# Patient Record
Sex: FEMALE
[De-identification: names, ages, dates, MRNs, and addresses within clinical notes are randomized per-mention and may not be internally consistent; named-entity substitution may affect disease eponyms.]

---

## 2017-01-22 ENCOUNTER — CHARTING TRANS (OUTPATIENT)
Dept: OTHER | Age: 42
End: 2017-01-22

## 2017-01-22 ASSESSMENT — PAIN SCALES - GENERAL: PAINLEVEL_OUTOF10: 2

## 2017-04-19 ENCOUNTER — OFFICE VISIT (OUTPATIENT)
Dept: OBGYN CLINIC | Facility: CLINIC | Age: 42
End: 2017-04-19

## 2017-04-19 VITALS
DIASTOLIC BLOOD PRESSURE: 68 MMHG | HEART RATE: 71 BPM | WEIGHT: 192.19 LBS | SYSTOLIC BLOOD PRESSURE: 120 MMHG | HEIGHT: 63 IN | BODY MASS INDEX: 34.05 KG/M2

## 2017-04-19 DIAGNOSIS — Z01.419 WELL WOMAN EXAM WITH ROUTINE GYNECOLOGICAL EXAM: Primary | ICD-10-CM

## 2017-04-19 DIAGNOSIS — N76.3 CHRONIC VULVITIS: ICD-10-CM

## 2017-04-19 DIAGNOSIS — Z12.4 ROUTINE PAPANICOLAOU SMEAR: ICD-10-CM

## 2017-04-19 DIAGNOSIS — Z12.31 SCREENING MAMMOGRAM, ENCOUNTER FOR: ICD-10-CM

## 2017-04-19 PROCEDURE — 87624 HPV HI-RISK TYP POOLED RSLT: CPT | Performed by: OBSTETRICS & GYNECOLOGY

## 2017-04-19 PROCEDURE — 88175 CYTOPATH C/V AUTO FLUID REDO: CPT | Performed by: OBSTETRICS & GYNECOLOGY

## 2017-04-19 PROCEDURE — 99396 PREV VISIT EST AGE 40-64: CPT | Performed by: OBSTETRICS & GYNECOLOGY

## 2017-04-19 PROCEDURE — 87210 SMEAR WET MOUNT SALINE/INK: CPT | Performed by: OBSTETRICS & GYNECOLOGY

## 2017-04-19 RX ORDER — CLOTRIMAZOLE AND BETAMETHASONE DIPROPIONATE 10; .64 MG/G; MG/G
CREAM TOPICAL
Qty: 45 G | Refills: 0 | Status: SHIPPED | OUTPATIENT
Start: 2017-04-19 | End: 2017-11-29

## 2017-04-19 RX ORDER — LEVOTHYROXINE SODIUM 88 UG/1
88 TABLET ORAL
COMMUNITY

## 2017-04-19 NOTE — PROGRESS NOTES
Patient ID:   Diana Aguilar  is a 39year old female [id number]        HPI:     Here for general gyn exam. Last seen October 2014. New medical/surgical hx:  None. Patient's last menstrual period was 04/17/2017 (exact date). Menses:   Hx Prior Abnorm technique. under mac        Current Outpatient Prescriptions on File Prior to Visit:  ARMOUR THYROID 90 MG Oral Tab  Disp:  Rfl:    alprazolam 0.25 MG Oral Tab Take 1 tablet (0.25 mg total) by mouth 3 (three) times daily as needed.  Disp: 30 tablet Rfl: 2

## 2017-11-29 RX ORDER — CLOTRIMAZOLE AND BETAMETHASONE DIPROPIONATE 10; .64 MG/G; MG/G
CREAM TOPICAL
Qty: 45 G | Refills: 0 | Status: SHIPPED | OUTPATIENT
Start: 2017-11-29 | End: 2018-08-15 | Stop reason: ALTCHOICE

## 2018-04-22 ENCOUNTER — HOSPITAL ENCOUNTER (OUTPATIENT)
Dept: ULTRASOUND IMAGING | Age: 43
Discharge: HOME | End: 2018-04-22
Attending: FAMILY MEDICINE
Payer: COMMERCIAL

## 2018-04-22 DIAGNOSIS — E03.9 HYPOTHYROIDISM, UNSPECIFIED TYPE: ICD-10-CM

## 2018-04-22 PROCEDURE — 76536 US EXAM OF HEAD AND NECK: CPT | Performed by: FAMILY MEDICINE

## 2018-08-15 ENCOUNTER — OFFICE VISIT (OUTPATIENT)
Dept: FAMILY MEDICINE CLINIC | Facility: CLINIC | Age: 43
End: 2018-08-15
Payer: COMMERCIAL

## 2018-08-15 VITALS
OXYGEN SATURATION: 99 % | BODY MASS INDEX: 36.86 KG/M2 | DIASTOLIC BLOOD PRESSURE: 78 MMHG | HEIGHT: 63 IN | RESPIRATION RATE: 20 BRPM | WEIGHT: 208 LBS | HEART RATE: 76 BPM | TEMPERATURE: 99 F | SYSTOLIC BLOOD PRESSURE: 120 MMHG

## 2018-08-15 DIAGNOSIS — Z13.0 SCREENING FOR ENDOCRINE, NUTRITIONAL, METABOLIC AND IMMUNITY DISORDER: ICD-10-CM

## 2018-08-15 DIAGNOSIS — Z12.31 VISIT FOR SCREENING MAMMOGRAM: ICD-10-CM

## 2018-08-15 DIAGNOSIS — Z13.29 SCREENING FOR ENDOCRINE, NUTRITIONAL, METABOLIC AND IMMUNITY DISORDER: ICD-10-CM

## 2018-08-15 DIAGNOSIS — R53.83 FATIGUE, UNSPECIFIED TYPE: ICD-10-CM

## 2018-08-15 DIAGNOSIS — Z00.00 ROUTINE GENERAL MEDICAL EXAMINATION AT A HEALTH CARE FACILITY: Primary | ICD-10-CM

## 2018-08-15 DIAGNOSIS — E66.01 SEVERE OBESITY (HCC): ICD-10-CM

## 2018-08-15 DIAGNOSIS — Z13.228 SCREENING FOR ENDOCRINE, NUTRITIONAL, METABOLIC AND IMMUNITY DISORDER: ICD-10-CM

## 2018-08-15 DIAGNOSIS — Z13.21 SCREENING FOR ENDOCRINE, NUTRITIONAL, METABOLIC AND IMMUNITY DISORDER: ICD-10-CM

## 2018-08-15 PROCEDURE — 99396 PREV VISIT EST AGE 40-64: CPT | Performed by: FAMILY MEDICINE

## 2018-08-15 PROCEDURE — 99213 OFFICE O/P EST LOW 20 MIN: CPT | Performed by: FAMILY MEDICINE

## 2018-08-15 NOTE — PATIENT INSTRUCTIONS
Prevention Guidelines, Women Ages 36 to 52  Screening tests and vaccines are an important part of managing your health. A screening test is done to find possible disorders or diseases in people who don't have any symptoms.  The goal is to find a disease e this age group At routine exams   Gonorrhea Sexually active women at increased risk for infection At routine exams   Hepatitis C Anyone at increased risk; 1 time for those born between [facility] At routine exams   High cholesterol or triglycerides All Pneumococcal conjugate vaccine (PCV13) and pneumococcal polysaccharide vaccine (PPSV23) Women at increased risk for infection–talk with your healthcare provider 1 or 2 doses   Tetanus/diphtheria/pertussis (Td/Tdap) booster All women in this age group A one

## 2018-08-15 NOTE — PROGRESS NOTES
Carlos Tracy is a 43year old female who presents for a complete physical exam, no gyn. HPI:     Patient presents with:  Physical      Patient feels well, dental visit up to date, no hearing problem. Vaccinations up to date.     Fatigue last few mon OF SYSTEMS:   GENERAL HEALTH: feels well otherwise  SKIN: denies any unusual skin lesions or rashes  EYES: no visual complaints or deficits  HEENT: denies nasal congestion, sinus pain or sore throat; hearing loss negative   RESPIRATORY: denies shortness of times weekly. Screening for endocrine, nutritional, metabolic and immunity disorder  -     CBC WITH DIFFERENTIAL WITH PLATELET; Future  -     COMP METABOLIC PANEL (14); Future  -     LIPID PANEL;  Future  -     ASSAY, THYROID STIM HORMONE; Future    V

## 2018-08-16 ENCOUNTER — LABORATORY ENCOUNTER (OUTPATIENT)
Dept: LAB | Age: 43
End: 2018-08-16
Attending: FAMILY MEDICINE
Payer: COMMERCIAL

## 2018-08-16 ENCOUNTER — TELEPHONE (OUTPATIENT)
Dept: FAMILY MEDICINE CLINIC | Facility: CLINIC | Age: 43
End: 2018-08-16

## 2018-08-16 ENCOUNTER — HOSPITAL ENCOUNTER (OUTPATIENT)
Dept: MAMMOGRAPHY | Age: 43
Discharge: HOME | End: 2018-08-16
Attending: FAMILY MEDICINE
Payer: COMMERCIAL

## 2018-08-16 DIAGNOSIS — Z13.21 SCREENING FOR ENDOCRINE, NUTRITIONAL, METABOLIC AND IMMUNITY DISORDER: ICD-10-CM

## 2018-08-16 DIAGNOSIS — Z00.00 ROUTINE GENERAL MEDICAL EXAMINATION AT A HEALTH CARE FACILITY: ICD-10-CM

## 2018-08-16 DIAGNOSIS — Z12.31 VISIT FOR SCREENING MAMMOGRAM: ICD-10-CM

## 2018-08-16 DIAGNOSIS — Z13.228 SCREENING FOR ENDOCRINE, NUTRITIONAL, METABOLIC AND IMMUNITY DISORDER: ICD-10-CM

## 2018-08-16 DIAGNOSIS — R53.83 FATIGUE, UNSPECIFIED TYPE: ICD-10-CM

## 2018-08-16 DIAGNOSIS — Z13.29 SCREENING FOR ENDOCRINE, NUTRITIONAL, METABOLIC AND IMMUNITY DISORDER: ICD-10-CM

## 2018-08-16 DIAGNOSIS — Z13.0 SCREENING FOR ENDOCRINE, NUTRITIONAL, METABOLIC AND IMMUNITY DISORDER: ICD-10-CM

## 2018-08-16 LAB
ALBUMIN SERPL-MCNC: 3.8 G/DL (ref 3.5–4.8)
ALBUMIN/GLOB SERPL: 1 {RATIO} (ref 1–2)
ALP LIVER SERPL-CCNC: 87 U/L (ref 37–98)
ALT SERPL-CCNC: 31 U/L (ref 14–54)
ANION GAP SERPL CALC-SCNC: 9 MMOL/L (ref 0–18)
AST SERPL-CCNC: 17 U/L (ref 15–41)
BASOPHILS # BLD AUTO: 0.07 X10(3) UL (ref 0–0.1)
BASOPHILS NFR BLD AUTO: 1.1 %
BILIRUB SERPL-MCNC: 0.5 MG/DL (ref 0.1–2)
BUN BLD-MCNC: 22 MG/DL (ref 8–20)
BUN/CREAT SERPL: 22.9 (ref 10–20)
CALCIUM BLD-MCNC: 9.2 MG/DL (ref 8.3–10.3)
CHLORIDE SERPL-SCNC: 105 MMOL/L (ref 101–111)
CHOLEST SMN-MCNC: 220 MG/DL (ref ?–200)
CO2 SERPL-SCNC: 24 MMOL/L (ref 22–32)
CREAT BLD-MCNC: 0.96 MG/DL (ref 0.55–1.02)
EOSINOPHIL # BLD AUTO: 0.19 X10(3) UL (ref 0–0.3)
EOSINOPHIL NFR BLD AUTO: 3 %
ERYTHROCYTE [DISTWIDTH] IN BLOOD BY AUTOMATED COUNT: 12.7 % (ref 11.5–16)
GLOBULIN PLAS-MCNC: 3.8 G/DL (ref 2.5–4)
GLUCOSE BLD-MCNC: 95 MG/DL (ref 70–99)
HAV AB SERPL IA-ACNC: 911 PG/ML (ref 193–986)
HCT VFR BLD AUTO: 40.3 % (ref 34–50)
HDLC SERPL-MCNC: 52 MG/DL (ref 40–59)
HGB BLD-MCNC: 13.2 G/DL (ref 12–16)
IMMATURE GRANULOCYTE COUNT: 0.02 X10(3) UL (ref 0–1)
IMMATURE GRANULOCYTE RATIO %: 0.3 %
LDLC SERPL CALC-MCNC: 139 MG/DL (ref ?–100)
LYMPHOCYTES # BLD AUTO: 1.62 X10(3) UL (ref 0.9–4)
LYMPHOCYTES NFR BLD AUTO: 25.8 %
M PROTEIN MFR SERPL ELPH: 7.6 G/DL (ref 6.1–8.3)
MCH RBC QN AUTO: 29.3 PG (ref 27–33.2)
MCHC RBC AUTO-ENTMCNC: 32.8 G/DL (ref 31–37)
MCV RBC AUTO: 89.4 FL (ref 81–100)
MONOCYTES # BLD AUTO: 0.53 X10(3) UL (ref 0.1–1)
MONOCYTES NFR BLD AUTO: 8.5 %
NEUTROPHIL ABS PRELIM: 3.84 X10 (3) UL (ref 1.3–6.7)
NEUTROPHILS # BLD AUTO: 3.84 X10(3) UL (ref 1.3–6.7)
NEUTROPHILS NFR BLD AUTO: 61.3 %
NONHDLC SERPL-MCNC: 168 MG/DL (ref ?–130)
OSMOLALITY SERPL CALC.SUM OF ELEC: 289 MOSM/KG (ref 275–295)
PLATELET # BLD AUTO: 370 10(3)UL (ref 150–450)
POTASSIUM SERPL-SCNC: 4.3 MMOL/L (ref 3.6–5.1)
RBC # BLD AUTO: 4.51 X10(6)UL (ref 3.8–5.1)
RED CELL DISTRIBUTION WIDTH-SD: 41.4 FL (ref 35.1–46.3)
SODIUM SERPL-SCNC: 138 MMOL/L (ref 136–144)
TRIGL SERPL-MCNC: 143 MG/DL (ref 30–149)
TSI SER-ACNC: 3.15 MIU/ML (ref 0.35–5.5)
VIT D+METAB SERPL-MCNC: 42.8 NG/ML (ref 30–100)
VLDLC SERPL CALC-MCNC: 29 MG/DL (ref 0–30)
WBC # BLD AUTO: 6.3 X10(3) UL (ref 4–13)

## 2018-08-16 PROCEDURE — 82306 VITAMIN D 25 HYDROXY: CPT | Performed by: FAMILY MEDICINE

## 2018-08-16 PROCEDURE — 36415 COLL VENOUS BLD VENIPUNCTURE: CPT | Performed by: FAMILY MEDICINE

## 2018-08-16 PROCEDURE — 77067 SCR MAMMO BI INCL CAD: CPT | Performed by: FAMILY MEDICINE

## 2018-08-16 PROCEDURE — 82607 VITAMIN B-12: CPT | Performed by: FAMILY MEDICINE

## 2018-08-16 PROCEDURE — 80050 GENERAL HEALTH PANEL: CPT | Performed by: FAMILY MEDICINE

## 2018-08-16 PROCEDURE — 80061 LIPID PANEL: CPT | Performed by: FAMILY MEDICINE

## 2018-08-16 NOTE — TELEPHONE ENCOUNTER
[facility] SCREENING BILAT (XCQ=[id number])   Order: [id number]   Status:  Final result   Visible to patient:  No (Not Released) Dx:  Visit for screening mammogram   Notes recorded by Rodriguez Vargas MD on 8/16/2018 at 9:07 AM CDT  Normal results.

## 2018-11-05 VITALS
RESPIRATION RATE: 18 BRPM | HEIGHT: 63 IN | SYSTOLIC BLOOD PRESSURE: 112 MMHG | DIASTOLIC BLOOD PRESSURE: 80 MMHG | HEART RATE: 88 BPM | TEMPERATURE: 98.1 F | BODY MASS INDEX: 32.78 KG/M2 | WEIGHT: 185 LBS

## 2019-03-25 ENCOUNTER — OFFICE VISIT (OUTPATIENT)
Dept: FAMILY MEDICINE CLINIC | Facility: CLINIC | Age: 44
End: 2019-03-25
Payer: COMMERCIAL

## 2019-03-25 VITALS
HEIGHT: 63 IN | HEART RATE: 74 BPM | BODY MASS INDEX: 36.86 KG/M2 | TEMPERATURE: 99 F | RESPIRATION RATE: 20 BRPM | OXYGEN SATURATION: 99 % | SYSTOLIC BLOOD PRESSURE: 122 MMHG | DIASTOLIC BLOOD PRESSURE: 70 MMHG | WEIGHT: 208 LBS

## 2019-03-25 DIAGNOSIS — Z82.49 FAMILY HISTORY OF PREMATURE CAD: ICD-10-CM

## 2019-03-25 DIAGNOSIS — R00.2 PALPITATIONS: Primary | ICD-10-CM

## 2019-03-25 DIAGNOSIS — R07.2 PRECORDIAL PAIN: ICD-10-CM

## 2019-03-25 PROCEDURE — 93000 ELECTROCARDIOGRAM COMPLETE: CPT | Performed by: FAMILY MEDICINE

## 2019-03-25 PROCEDURE — 99214 OFFICE O/P EST MOD 30 MIN: CPT | Performed by: FAMILY MEDICINE

## 2019-03-25 RX ORDER — LEVOTHYROXINE AND LIOTHYRONINE 57; 13.5 UG/1; UG/1
1 TABLET ORAL DAILY
COMMUNITY

## 2019-03-25 RX ORDER — ALPRAZOLAM 0.5 MG/1
0.5 TABLET ORAL NIGHTLY PRN
Qty: 30 TABLET | Refills: 3 | Status: SHIPPED
Start: 2019-03-25 | End: 2019-10-23

## 2019-03-25 NOTE — PROGRESS NOTES
Earle Galeana is a 37year old female presents with chest pain. HPI:  The patient complaints of chest pain. Pain is located at precordial area. Pain is described as sharp. Severity is mild. Associated symptoms: palpitations. No radiation.  Has had History:   Procedure Laterality Date   • HERNIA SURGERY     • TUBAL LIGATION Bilateral 04/08/2010    via essure technique.  under mac       Social History:    Social History    Tobacco Use      Smoking status: Never Smoker      Smokeless tobacco: Never Used ADULT (CPT=93017); Future  -     CARD ECHO 2D DOPPLER (CPT=93306); Future  -     CARD MONITOR HOLTER 24 HOUR (CPT=93225); Future  -     D-DIMER; Future  -     COMP METABOLIC PANEL (14); Future  -     CBC WITH DIFFERENTIAL WITH PLATELET;  Future  -     VITAM

## 2019-05-07 ENCOUNTER — LAB ENCOUNTER (OUTPATIENT)
Dept: LAB | Age: 44
End: 2019-05-07
Attending: FAMILY MEDICINE
Payer: COMMERCIAL

## 2019-05-07 ENCOUNTER — OFFICE VISIT (OUTPATIENT)
Dept: FAMILY MEDICINE CLINIC | Facility: CLINIC | Age: 44
End: 2019-05-07
Payer: COMMERCIAL

## 2019-05-07 VITALS
HEIGHT: 63 IN | OXYGEN SATURATION: 99 % | HEART RATE: 76 BPM | BODY MASS INDEX: 35.61 KG/M2 | WEIGHT: 201 LBS | RESPIRATION RATE: 20 BRPM | DIASTOLIC BLOOD PRESSURE: 70 MMHG | SYSTOLIC BLOOD PRESSURE: 120 MMHG | TEMPERATURE: 99 F

## 2019-05-07 DIAGNOSIS — R07.2 PRECORDIAL PAIN: ICD-10-CM

## 2019-05-07 DIAGNOSIS — Z82.49 FAMILY HISTORY OF PREMATURE CAD: ICD-10-CM

## 2019-05-07 DIAGNOSIS — N39.3 STRESS INCONTINENCE: ICD-10-CM

## 2019-05-07 DIAGNOSIS — R00.2 PALPITATIONS: ICD-10-CM

## 2019-05-07 DIAGNOSIS — M54.31 SCIATICA OF RIGHT SIDE: Primary | ICD-10-CM

## 2019-05-07 PROCEDURE — 85378 FIBRIN DEGRADE SEMIQUANT: CPT | Performed by: FAMILY MEDICINE

## 2019-05-07 PROCEDURE — 99214 OFFICE O/P EST MOD 30 MIN: CPT | Performed by: FAMILY MEDICINE

## 2019-05-07 PROCEDURE — 82306 VITAMIN D 25 HYDROXY: CPT | Performed by: FAMILY MEDICINE

## 2019-05-07 PROCEDURE — 80053 COMPREHEN METABOLIC PANEL: CPT | Performed by: FAMILY MEDICINE

## 2019-05-07 PROCEDURE — 36415 COLL VENOUS BLD VENIPUNCTURE: CPT | Performed by: FAMILY MEDICINE

## 2019-05-07 PROCEDURE — 85025 COMPLETE CBC W/AUTO DIFF WBC: CPT | Performed by: FAMILY MEDICINE

## 2019-05-07 RX ORDER — CARISOPRODOL 350 MG/1
350 TABLET ORAL NIGHTLY PRN
Qty: 30 TABLET | Refills: 3 | Status: SHIPPED
Start: 2019-05-07 | End: 2019-05-10

## 2019-05-07 RX ORDER — METHYLPREDNISOLONE 4 MG/1
TABLET ORAL
Qty: 1 KIT | Refills: 0 | Status: SHIPPED | OUTPATIENT
Start: 2019-05-07 | End: 2019-10-23 | Stop reason: ALTCHOICE

## 2019-05-07 NOTE — PATIENT INSTRUCTIONS
Dr. Nidia Deng    Urology      [address] [facility], [facility]    Phone: [phone]  Sciatica    Sciatica is a condition that causes pain in the lower back that spreads down into the buttock, hip, and leg.  Sometimes the leg pain can happen witho with pillows under your knees. You can also try lying on your side with your knees bent up toward your chest and a pillow between your knees. · Avoid sitting for long periods. This puts more stress on your lower back than standing or walking.   · Use heat

## 2019-05-07 NOTE — PROGRESS NOTES
Patient presents with:  Back Pain:  Rt. Hip pain        HPI:      Pt has R buttock pain, sharp, throbbing, radiation to the R foot, worsening, certain positions makes it worse and rest and lying still makes it better. Worsening. Last:few days.     The juanita Never Smoker      Smokeless tobacco: Never Used    Alcohol use: Yes      Alcohol/week: 0.0 oz      Comment: social    Drug use: No            ROS:  GEN: no fever, no chills, no fatigue  CHEST: no chest pains.   SKIN: no rashes  HEM: no ecchymoses  JOINTS: n

## 2019-05-08 ENCOUNTER — TELEPHONE (OUTPATIENT)
Dept: FAMILY MEDICINE CLINIC | Facility: CLINIC | Age: 44
End: 2019-05-08

## 2019-05-08 NOTE — TELEPHONE ENCOUNTER
Patient said the pharmacy would not fill her Carisoprodol because they said a drug interaction with her Xanax. Patient said the pharmacy said that they were going to send this over to us.

## 2019-05-08 NOTE — TELEPHONE ENCOUNTER
Pharmacy is calling to confirm they can fill Xanax & Carisoprodol, as there may be a possible interaction of increased drowsiness & dizziness. Please advise if OK to fill both medications.

## 2019-05-10 RX ORDER — CYCLOBENZAPRINE HCL 5 MG
5 TABLET ORAL NIGHTLY PRN
Qty: 30 TABLET | Refills: 0 | COMMUNITY
Start: 2019-05-10 | End: 2019-10-23 | Stop reason: ALTCHOICE

## 2019-05-10 NOTE — TELEPHONE ENCOUNTER
[facility] called, [facility] cancelled & Flexeril Rx called in. Pt uses [facility], message sent to pt via [facility].

## 2019-07-12 ENCOUNTER — HOSPITAL ENCOUNTER (OUTPATIENT)
Dept: CV DIAGNOSTICS | Age: 44
Discharge: HOME | End: 2019-07-12
Attending: FAMILY MEDICINE
Payer: COMMERCIAL

## 2019-07-12 ENCOUNTER — TELEPHONE (OUTPATIENT)
Dept: FAMILY MEDICINE CLINIC | Facility: CLINIC | Age: 44
End: 2019-07-12

## 2019-07-12 DIAGNOSIS — Z82.49 FAMILY HISTORY OF PREMATURE CAD: ICD-10-CM

## 2019-07-12 DIAGNOSIS — R07.2 PRECORDIAL PAIN: ICD-10-CM

## 2019-07-12 DIAGNOSIS — R00.2 PALPITATIONS: ICD-10-CM

## 2019-07-12 PROCEDURE — 93306 TTE W/DOPPLER COMPLETE: CPT | Performed by: FAMILY MEDICINE

## 2019-07-12 PROCEDURE — 93227 XTRNL ECG REC<48 HR R&I: CPT | Performed by: FAMILY MEDICINE

## 2019-07-12 PROCEDURE — 93017 CV STRESS TEST TRACING ONLY: CPT | Performed by: FAMILY MEDICINE

## 2019-07-12 PROCEDURE — 93225 XTRNL ECG REC<48 HRS REC: CPT | Performed by: FAMILY MEDICINE

## 2019-07-12 PROCEDURE — 93018 CV STRESS TEST I&R ONLY: CPT | Performed by: FAMILY MEDICINE

## 2019-07-12 NOTE — TELEPHONE ENCOUNTER
----- Message from Sherine Keith MD sent at 7/12/2019 11:50 AM CDT -----  Overall good, left atrium volume mildly increased, weight loss, check BP, no other actions needed.

## 2019-07-16 ENCOUNTER — TELEPHONE (OUTPATIENT)
Dept: FAMILY MEDICINE CLINIC | Facility: CLINIC | Age: 44
End: 2019-07-16

## 2019-07-16 NOTE — TELEPHONE ENCOUNTER
CARD MONITOR HOLTER 24 HOUR  Notes recorded by Chanel Huber MD on 7/16/2019 at 1:42 PM CDT  Normal results.

## 2019-10-21 ENCOUNTER — ORDER TRANSCRIPTION (OUTPATIENT)
Dept: ADMINISTRATIVE | Facility: HOSPITAL | Age: 44
End: 2019-10-21

## 2019-10-21 DIAGNOSIS — Z12.31 ENCOUNTER FOR SCREENING MAMMOGRAM FOR MALIGNANT NEOPLASM OF BREAST: Primary | ICD-10-CM

## 2019-10-23 ENCOUNTER — OFFICE VISIT (OUTPATIENT)
Dept: FAMILY MEDICINE CLINIC | Facility: CLINIC | Age: 44
End: 2019-10-23
Payer: COMMERCIAL

## 2019-10-23 ENCOUNTER — HOSPITAL ENCOUNTER (OUTPATIENT)
Dept: MAMMOGRAPHY | Age: 44
Discharge: HOME | End: 2019-10-23
Attending: FAMILY MEDICINE
Payer: COMMERCIAL

## 2019-10-23 ENCOUNTER — TELEPHONE (OUTPATIENT)
Dept: FAMILY MEDICINE CLINIC | Facility: CLINIC | Age: 44
End: 2019-10-23

## 2019-10-23 VITALS
RESPIRATION RATE: 14 BRPM | TEMPERATURE: 98 F | BODY MASS INDEX: 32.25 KG/M2 | HEART RATE: 74 BPM | OXYGEN SATURATION: 97 % | WEIGHT: 182 LBS | HEIGHT: 63 IN | SYSTOLIC BLOOD PRESSURE: 108 MMHG | DIASTOLIC BLOOD PRESSURE: 70 MMHG

## 2019-10-23 DIAGNOSIS — E03.9 ACQUIRED HYPOTHYROIDISM: ICD-10-CM

## 2019-10-23 DIAGNOSIS — Z13.0 SCREENING FOR ENDOCRINE, NUTRITIONAL, METABOLIC AND IMMUNITY DISORDER: ICD-10-CM

## 2019-10-23 DIAGNOSIS — Z13.29 SCREENING FOR ENDOCRINE, NUTRITIONAL, METABOLIC AND IMMUNITY DISORDER: ICD-10-CM

## 2019-10-23 DIAGNOSIS — R53.83 FATIGUE, UNSPECIFIED TYPE: ICD-10-CM

## 2019-10-23 DIAGNOSIS — Z12.31 ENCOUNTER FOR SCREENING MAMMOGRAM FOR MALIGNANT NEOPLASM OF BREAST: ICD-10-CM

## 2019-10-23 DIAGNOSIS — R14.0 BLOATING: ICD-10-CM

## 2019-10-23 DIAGNOSIS — Z00.00 ROUTINE GENERAL MEDICAL EXAMINATION AT A HEALTH CARE FACILITY: Primary | ICD-10-CM

## 2019-10-23 DIAGNOSIS — E53.8 VITAMIN B12 DEFICIENCY: ICD-10-CM

## 2019-10-23 DIAGNOSIS — Z13.228 SCREENING FOR ENDOCRINE, NUTRITIONAL, METABOLIC AND IMMUNITY DISORDER: ICD-10-CM

## 2019-10-23 DIAGNOSIS — Z13.21 SCREENING FOR ENDOCRINE, NUTRITIONAL, METABOLIC AND IMMUNITY DISORDER: ICD-10-CM

## 2019-10-23 PROCEDURE — 99213 OFFICE O/P EST LOW 20 MIN: CPT | Performed by: FAMILY MEDICINE

## 2019-10-23 PROCEDURE — 77067 SCR MAMMO BI INCL CAD: CPT | Performed by: FAMILY MEDICINE

## 2019-10-23 PROCEDURE — 99396 PREV VISIT EST AGE 40-64: CPT | Performed by: FAMILY MEDICINE

## 2019-10-23 RX ORDER — ALPRAZOLAM 0.5 MG/1
0.5 TABLET ORAL NIGHTLY PRN
Qty: 30 TABLET | Refills: 3 | Status: SHIPPED | OUTPATIENT
Start: 2019-10-23 | End: 2020-05-11

## 2019-10-23 NOTE — PROGRESS NOTES
Jacy Buenrostro is a 37year old female who presents for a complete physical exam, no gyn, abdominal pain. HPI:     Pt states over past 1 year has had 'break through spotting' and periods are not regular. Bloating: Recurrent x6 months.  Frequent soft s lymphoma   • Heart Disorder Paternal Grandfather    • Heart Disorder Paternal Uncle    • Lipids Maternal Aunt    • Diabetes Maternal Aunt       Social History    Tobacco Use      Smoking status: Former Smoker        Packs/day: 0.00      Smokeless tobac symmetric. Sensation intact. Extremities: are symmetric with no cyanosis, clubbing, or edema. MS: Normal muscles tones, no joints abnormalities. SKIN: Normal color, turgor, no lesions, rashes or wounds. PSYCH: Normal affect and mood.           ASSESSMENT

## 2019-10-23 NOTE — PATIENT INSTRUCTIONS
[facility]  Gastroenterology    [facility]  [address]  [facility]    [address] [place] [address]  [place], [address]    [address]   [place], [facility]

## 2019-11-11 ENCOUNTER — LAB ENCOUNTER (OUTPATIENT)
Dept: LAB | Age: 44
End: 2019-11-11
Attending: FAMILY MEDICINE
Payer: COMMERCIAL

## 2019-11-11 ENCOUNTER — TELEPHONE (OUTPATIENT)
Dept: FAMILY MEDICINE CLINIC | Facility: CLINIC | Age: 44
End: 2019-11-11

## 2019-11-11 ENCOUNTER — HOSPITAL ENCOUNTER (OUTPATIENT)
Dept: ULTRASOUND IMAGING | Age: 44
Discharge: HOME | End: 2019-11-11
Attending: FAMILY MEDICINE
Payer: COMMERCIAL

## 2019-11-11 DIAGNOSIS — Z13.29 SCREENING FOR ENDOCRINE, NUTRITIONAL, METABOLIC AND IMMUNITY DISORDER: ICD-10-CM

## 2019-11-11 DIAGNOSIS — E53.8 VITAMIN B12 DEFICIENCY: ICD-10-CM

## 2019-11-11 DIAGNOSIS — R14.0 BLOATING: ICD-10-CM

## 2019-11-11 DIAGNOSIS — Z13.0 SCREENING FOR ENDOCRINE, NUTRITIONAL, METABOLIC AND IMMUNITY DISORDER: ICD-10-CM

## 2019-11-11 DIAGNOSIS — Z13.21 SCREENING FOR ENDOCRINE, NUTRITIONAL, METABOLIC AND IMMUNITY DISORDER: ICD-10-CM

## 2019-11-11 DIAGNOSIS — E03.9 ACQUIRED HYPOTHYROIDISM: ICD-10-CM

## 2019-11-11 DIAGNOSIS — R53.83 FATIGUE, UNSPECIFIED TYPE: ICD-10-CM

## 2019-11-11 DIAGNOSIS — R16.0 LIVER MASS: Primary | ICD-10-CM

## 2019-11-11 DIAGNOSIS — Z13.228 SCREENING FOR ENDOCRINE, NUTRITIONAL, METABOLIC AND IMMUNITY DISORDER: ICD-10-CM

## 2019-11-11 DIAGNOSIS — Z00.00 ROUTINE GENERAL MEDICAL EXAMINATION AT A HEALTH CARE FACILITY: ICD-10-CM

## 2019-11-11 PROCEDURE — 82607 VITAMIN B-12: CPT | Performed by: FAMILY MEDICINE

## 2019-11-11 PROCEDURE — 83516 IMMUNOASSAY NONANTIBODY: CPT | Performed by: FAMILY MEDICINE

## 2019-11-11 PROCEDURE — 76700 US EXAM ABDOM COMPLETE: CPT | Performed by: FAMILY MEDICINE

## 2019-11-11 PROCEDURE — 82306 VITAMIN D 25 HYDROXY: CPT | Performed by: FAMILY MEDICINE

## 2019-11-11 PROCEDURE — 84481 FREE ASSAY (FT-3): CPT | Performed by: FAMILY MEDICINE

## 2019-11-11 PROCEDURE — 36415 COLL VENOUS BLD VENIPUNCTURE: CPT | Performed by: FAMILY MEDICINE

## 2019-11-11 PROCEDURE — 82784 ASSAY IGA/IGD/IGG/IGM EACH: CPT | Performed by: FAMILY MEDICINE

## 2019-11-11 PROCEDURE — 84439 ASSAY OF FREE THYROXINE: CPT | Performed by: FAMILY MEDICINE

## 2019-11-11 PROCEDURE — 86256 FLUORESCENT ANTIBODY TITER: CPT | Performed by: FAMILY MEDICINE

## 2019-11-11 PROCEDURE — 80061 LIPID PANEL: CPT | Performed by: FAMILY MEDICINE

## 2019-11-11 PROCEDURE — 80050 GENERAL HEALTH PANEL: CPT | Performed by: FAMILY MEDICINE

## 2019-11-11 NOTE — TELEPHONE ENCOUNTER
----- Message from Marina Jung MD sent at 11/11/2019 10:21 AM CST -----  Ct abdomen with and without contrast, abnormal kidney image and liver mass. Non stat. Thanks.

## 2019-11-14 ENCOUNTER — TELEPHONE (OUTPATIENT)
Dept: FAMILY MEDICINE CLINIC | Facility: CLINIC | Age: 44
End: 2019-11-14

## 2019-11-14 NOTE — TELEPHONE ENCOUNTER
----- Message from Claudia Frye MD sent at 11/14/2019  9:21 AM CST -----  Possible gluten intolerance, too much vit. B12 and if pt on thyroid supplement taking too much. I think she sees somebody for thyroid. Cut vit. B12 in half dose, see GI for f/u.   D

## 2019-11-22 NOTE — TELEPHONE ENCOUNTER
Spoke to pt with results/instructions and order placed.   Discussed with pt to wait until hear if testing approved or not before scheduling

## 2019-12-09 ENCOUNTER — HOSPITAL ENCOUNTER (OUTPATIENT)
Dept: CT IMAGING | Age: 44
Discharge: HOME | End: 2019-12-09
Attending: FAMILY MEDICINE
Payer: COMMERCIAL

## 2019-12-09 DIAGNOSIS — R16.0 LIVER MASS: ICD-10-CM

## 2019-12-09 PROCEDURE — 74170 CT ABD WO CNTRST FLWD CNTRST: CPT | Performed by: FAMILY MEDICINE

## 2019-12-09 PROCEDURE — 82565 ASSAY OF CREATININE: CPT

## 2019-12-10 ENCOUNTER — TELEPHONE (OUTPATIENT)
Dept: FAMILY MEDICINE CLINIC | Facility: CLINIC | Age: 44
End: 2019-12-10

## 2019-12-10 NOTE — TELEPHONE ENCOUNTER
Pt notified of CT abdomen results. She states she has lower abdominal cramping & bloating but not in 1 specific area. She feels it is more bowel issues. She has not followed up with GI as instructed in Oct but will schedule an appt.   Any other orders fo

## 2019-12-10 NOTE — TELEPHONE ENCOUNTER
----- Message from Hannah Orr MD sent at 12/9/2019  1:29 PM CST -----  Benign meningioma of the liver, small umbilical hernia, does pt have any pain?

## 2019-12-10 NOTE — TELEPHONE ENCOUNTER
Patient advised just follow up with GI at this time. She verbalized understanding is agreeable to this.

## 2020-05-11 DIAGNOSIS — G47.00 INSOMNIA, UNSPECIFIED TYPE: Primary | ICD-10-CM

## 2020-05-11 RX ORDER — ALPRAZOLAM 0.5 MG/1
TABLET ORAL
Qty: 15 TABLET | Refills: 0 | Status: SHIPPED | OUTPATIENT
Start: 2020-05-11 | End: 2022-05-02

## 2021-01-28 ENCOUNTER — OFFICE VISIT (OUTPATIENT)
Dept: FAMILY MEDICINE CLINIC | Facility: CLINIC | Age: 46
End: 2021-01-28
Payer: COMMERCIAL

## 2021-01-28 VITALS
TEMPERATURE: 98 F | HEART RATE: 72 BPM | OXYGEN SATURATION: 95 % | RESPIRATION RATE: 18 BRPM | HEIGHT: 63 IN | DIASTOLIC BLOOD PRESSURE: 90 MMHG | SYSTOLIC BLOOD PRESSURE: 132 MMHG | WEIGHT: 209 LBS | BODY MASS INDEX: 37.03 KG/M2

## 2021-01-28 DIAGNOSIS — Z13.21 SCREENING FOR ENDOCRINE, NUTRITIONAL, METABOLIC AND IMMUNITY DISORDER: ICD-10-CM

## 2021-01-28 DIAGNOSIS — Z12.31 VISIT FOR SCREENING MAMMOGRAM: ICD-10-CM

## 2021-01-28 DIAGNOSIS — Z13.29 SCREENING FOR ENDOCRINE, NUTRITIONAL, METABOLIC AND IMMUNITY DISORDER: ICD-10-CM

## 2021-01-28 DIAGNOSIS — Z13.228 SCREENING FOR ENDOCRINE, NUTRITIONAL, METABOLIC AND IMMUNITY DISORDER: ICD-10-CM

## 2021-01-28 DIAGNOSIS — Z13.0 SCREENING FOR ENDOCRINE, NUTRITIONAL, METABOLIC AND IMMUNITY DISORDER: ICD-10-CM

## 2021-01-28 DIAGNOSIS — Z00.00 ROUTINE GENERAL MEDICAL EXAMINATION AT A HEALTH CARE FACILITY: Primary | ICD-10-CM

## 2021-01-28 DIAGNOSIS — F41.9 ANXIETY: ICD-10-CM

## 2021-01-28 PROBLEM — R07.2 PRECORDIAL PAIN: Status: RESOLVED | Noted: 2019-03-25 | Resolved: 2021-01-28

## 2021-01-28 PROCEDURE — 3008F BODY MASS INDEX DOCD: CPT | Performed by: FAMILY MEDICINE

## 2021-01-28 PROCEDURE — 3080F DIAST BP >= 90 MM HG: CPT | Performed by: FAMILY MEDICINE

## 2021-01-28 PROCEDURE — 99396 PREV VISIT EST AGE 40-64: CPT | Performed by: FAMILY MEDICINE

## 2021-01-28 PROCEDURE — 99213 OFFICE O/P EST LOW 20 MIN: CPT | Performed by: FAMILY MEDICINE

## 2021-01-28 PROCEDURE — 3075F SYST BP GE 130 - 139MM HG: CPT | Performed by: FAMILY MEDICINE

## 2021-01-28 RX ORDER — ESCITALOPRAM OXALATE 10 MG/1
10 TABLET ORAL DAILY
Qty: 30 TABLET | Refills: 3 | Status: SHIPPED | OUTPATIENT
Start: 2021-01-28

## 2021-01-28 RX ORDER — LEVOTHYROXINE AND LIOTHYRONINE 9.5; 2.25 UG/1; UG/1
15 TABLET ORAL DAILY
COMMUNITY

## 2021-01-28 NOTE — PATIENT INSTRUCTIONS
Dr. Theron Arshad [facility]  [address] [facility] [address] [facility], [facility] [address]  [phone]    [place], [place]  Phone: [phone] [address] [facility], [address]  Phone: [phone]

## 2021-01-28 NOTE — PROGRESS NOTES
Lillian Lauren is a 39year old female who presents for a complete physical exam, no gyn. HPI:     Patient presents with:  Physical: anxiety attacks, more intense and more frequent       Patient feels well, dental visit up to date, no hearing problem. (Cancer lungs) Father         lung, at 68   • Diabetes Mother    • Cancer Maternal Grandmother         lymphoma   • Heart Disorder Paternal Grandfather    • Heart Disorder Paternal Uncle    • Lipids Maternal Aunt    • Diabetes Maternal Aunt       Social Hi no lumps, no nipple abnormalities. Abdomen: is soft,NBS, NT/ND with no HSM. No rebound or guarding. No CVA tenderness, no hernias. Neuro: Cranial nerves II-XII normal,no focal abnormalities, and reflexes coordination and gait normal and symmetric. Sensat

## 2021-02-15 ENCOUNTER — TELEPHONE (OUTPATIENT)
Dept: FAMILY MEDICINE CLINIC | Facility: CLINIC | Age: 46
End: 2021-02-15

## 2021-02-15 ENCOUNTER — HOSPITAL ENCOUNTER (OUTPATIENT)
Dept: MAMMOGRAPHY | Age: 46
Discharge: HOME | End: 2021-02-15
Attending: FAMILY MEDICINE
Payer: COMMERCIAL

## 2021-02-15 ENCOUNTER — LAB ENCOUNTER (OUTPATIENT)
Dept: LAB | Age: 46
End: 2021-02-15
Attending: FAMILY MEDICINE
Payer: COMMERCIAL

## 2021-02-15 DIAGNOSIS — Z12.31 VISIT FOR SCREENING MAMMOGRAM: ICD-10-CM

## 2021-02-15 DIAGNOSIS — Z13.29 SCREENING FOR ENDOCRINE, NUTRITIONAL, METABOLIC AND IMMUNITY DISORDER: ICD-10-CM

## 2021-02-15 DIAGNOSIS — Z13.21 SCREENING FOR ENDOCRINE, NUTRITIONAL, METABOLIC AND IMMUNITY DISORDER: ICD-10-CM

## 2021-02-15 DIAGNOSIS — Z13.228 SCREENING FOR ENDOCRINE, NUTRITIONAL, METABOLIC AND IMMUNITY DISORDER: ICD-10-CM

## 2021-02-15 DIAGNOSIS — Z13.0 SCREENING FOR ENDOCRINE, NUTRITIONAL, METABOLIC AND IMMUNITY DISORDER: ICD-10-CM

## 2021-02-15 LAB
ALBUMIN SERPL-MCNC: 3.6 G/DL (ref 3.4–5)
ALBUMIN/GLOB SERPL: 0.9 {RATIO} (ref 1–2)
ALP LIVER SERPL-CCNC: 76 U/L
ALT SERPL-CCNC: 43 U/L
ANION GAP SERPL CALC-SCNC: 5 MMOL/L (ref 0–18)
AST SERPL-CCNC: 19 U/L (ref 15–37)
BASOPHILS # BLD AUTO: 0.05 X10(3) UL (ref 0–0.2)
BASOPHILS NFR BLD AUTO: 1 %
BILIRUB SERPL-MCNC: 0.4 MG/DL (ref 0.1–2)
BUN BLD-MCNC: 15 MG/DL (ref 7–18)
BUN/CREAT SERPL: 17 (ref 10–20)
CALCIUM BLD-MCNC: 9.3 MG/DL (ref 8.5–10.1)
CHLORIDE SERPL-SCNC: 105 MMOL/L (ref 98–112)
CHOLEST SMN-MCNC: 199 MG/DL (ref ?–200)
CO2 SERPL-SCNC: 28 MMOL/L (ref 21–32)
CREAT BLD-MCNC: 0.88 MG/DL
DEPRECATED RDW RBC AUTO: 41.5 FL (ref 35.1–46.3)
EOSINOPHIL # BLD AUTO: 0.15 X10(3) UL (ref 0–0.7)
EOSINOPHIL NFR BLD AUTO: 2.9 %
ERYTHROCYTE [DISTWIDTH] IN BLOOD BY AUTOMATED COUNT: 12.5 % (ref 11–15)
GLOBULIN PLAS-MCNC: 3.8 G/DL (ref 2.8–4.4)
GLUCOSE BLD-MCNC: 99 MG/DL (ref 70–99)
HCT VFR BLD AUTO: 43.5 %
HDLC SERPL-MCNC: 55 MG/DL (ref 40–59)
HGB BLD-MCNC: 14.2 G/DL
IMM GRANULOCYTES # BLD AUTO: 0.01 X10(3) UL (ref 0–1)
IMM GRANULOCYTES NFR BLD: 0.2 %
LDLC SERPL CALC-MCNC: 117 MG/DL (ref ?–100)
LYMPHOCYTES # BLD AUTO: 1.91 X10(3) UL (ref 1–4)
LYMPHOCYTES NFR BLD AUTO: 36.7 %
M PROTEIN MFR SERPL ELPH: 7.4 G/DL (ref 6.4–8.2)
MCH RBC QN AUTO: 29.3 PG (ref 26–34)
MCHC RBC AUTO-ENTMCNC: 32.6 G/DL (ref 31–37)
MCV RBC AUTO: 89.9 FL
MONOCYTES # BLD AUTO: 0.45 X10(3) UL (ref 0.1–1)
MONOCYTES NFR BLD AUTO: 8.7 %
NEUTROPHILS # BLD AUTO: 2.63 X10 (3) UL (ref 1.5–7.7)
NEUTROPHILS # BLD AUTO: 2.63 X10(3) UL (ref 1.5–7.7)
NEUTROPHILS NFR BLD AUTO: 50.5 %
NONHDLC SERPL-MCNC: 144 MG/DL (ref ?–130)
OSMOLALITY SERPL CALC.SUM OF ELEC: 287 MOSM/KG (ref 275–295)
PATIENT FASTING Y/N/NP: YES
PATIENT FASTING Y/N/NP: YES
PLATELET # BLD AUTO: 312 10(3)UL (ref 150–450)
POTASSIUM SERPL-SCNC: 4.2 MMOL/L (ref 3.5–5.1)
RBC # BLD AUTO: 4.84 X10(6)UL
SODIUM SERPL-SCNC: 138 MMOL/L (ref 136–145)
T3FREE SERPL-MCNC: 3.57 PG/ML (ref 2.4–4.2)
T4 FREE SERPL-MCNC: 1 NG/DL (ref 0.8–1.7)
TRIGL SERPL-MCNC: 133 MG/DL (ref 30–149)
TSI SER-ACNC: 0.01 MIU/ML (ref 0.36–3.74)
VLDLC SERPL CALC-MCNC: 27 MG/DL (ref 0–30)
WBC # BLD AUTO: 5.2 X10(3) UL (ref 4–11)

## 2021-02-15 PROCEDURE — 80061 LIPID PANEL: CPT | Performed by: FAMILY MEDICINE

## 2021-02-15 PROCEDURE — 36415 COLL VENOUS BLD VENIPUNCTURE: CPT | Performed by: FAMILY MEDICINE

## 2021-02-15 PROCEDURE — 80050 GENERAL HEALTH PANEL: CPT | Performed by: FAMILY MEDICINE

## 2021-02-15 PROCEDURE — 77067 SCR MAMMO BI INCL CAD: CPT | Performed by: FAMILY MEDICINE

## 2021-02-15 PROCEDURE — 84481 FREE ASSAY (FT-3): CPT | Performed by: FAMILY MEDICINE

## 2021-02-15 PROCEDURE — 84439 ASSAY OF FREE THYROXINE: CPT | Performed by: FAMILY MEDICINE

## 2021-02-15 NOTE — TELEPHONE ENCOUNTER
----- Message from Maranda Carlisle MD sent at 2/15/2021  9:43 AM CST -----  Needs additional views of the mammogram. INR within therapeutic range. Patient denies any bleeding, bruising or other changes that may affect warfarin therapy. Dose per calendar. Patient advised to call coumadin clinic with any changes or concerns

## 2021-02-15 NOTE — TELEPHONE ENCOUNTER
Called pt and left vm instructing pt of mammogram orders. Informed pt orders are placed and to call central scheduling to schedule exam. Central scheduling phone number provided. Instructed pt to call office with any questions. Office number provided.

## 2021-02-16 ENCOUNTER — TELEPHONE (OUTPATIENT)
Dept: FAMILY MEDICINE CLINIC | Facility: CLINIC | Age: 46
End: 2021-02-16

## 2021-02-16 NOTE — TELEPHONE ENCOUNTER
----- Message from Dariel Cheng MD sent at 2/16/2021  2:11 PM CST -----  Very low TSH, if she takes thyroid from another doctor, need to f/u there, we can fax the result,

## 2021-02-22 NOTE — PROGRESS NOTES
Jacy Buenrostro is a 39year old female with anxiety f/u. Jacy Buenrostro verbally consents to a Virtual/Video Check-In service on 02/22/21.     Time spent:    HPI:   Pt  F/u anxiety, feeling much better, less panic attacks, mood under control, feels w 01/31/2021 (Exact Date)   GENERAL: in no apparent distress  HEENT: normal voice  LUNGS: no SOB  PSYCH: normal mood and affect, no hallucinations, no SI. Normal insight/judjment. ASSESSMENT AND PLAN:   Anxiety -doing better.   Requested Prescriptions

## 2021-03-08 ENCOUNTER — TELEPHONE (OUTPATIENT)
Dept: FAMILY MEDICINE CLINIC | Facility: CLINIC | Age: 46
End: 2021-03-08

## 2021-03-08 NOTE — TELEPHONE ENCOUNTER
Mammogram dept has attempted to call the patient multiple times and have been unable to reach her she is in need of additional views.  They will take her off the contact list.

## 2021-03-26 ENCOUNTER — HOSPITAL ENCOUNTER (OUTPATIENT)
Dept: MAMMOGRAPHY | Age: 46
Discharge: HOME | End: 2021-03-26
Attending: FAMILY MEDICINE
Payer: COMMERCIAL

## 2021-03-26 ENCOUNTER — TELEPHONE (OUTPATIENT)
Dept: FAMILY MEDICINE CLINIC | Facility: CLINIC | Age: 46
End: 2021-03-26

## 2021-03-26 ENCOUNTER — HOSPITAL ENCOUNTER (OUTPATIENT)
Dept: ULTRASOUND IMAGING | Age: 46
Discharge: HOME | End: 2021-03-26
Attending: FAMILY MEDICINE
Payer: COMMERCIAL

## 2021-03-26 DIAGNOSIS — R92.2 INCONCLUSIVE MAMMOGRAM: Primary | ICD-10-CM

## 2021-03-26 DIAGNOSIS — R92.2 INCONCLUSIVE MAMMOGRAM: ICD-10-CM

## 2021-03-26 PROCEDURE — 77065 DX MAMMO INCL CAD UNI: CPT | Performed by: FAMILY MEDICINE

## 2021-03-26 PROCEDURE — 76642 ULTRASOUND BREAST LIMITED: CPT | Performed by: FAMILY MEDICINE

## 2021-03-26 PROCEDURE — 77061 BREAST TOMOSYNTHESIS UNI: CPT | Performed by: FAMILY MEDICINE

## 2021-03-26 NOTE — TELEPHONE ENCOUNTER
----- Message from Jyoti Cooper MD sent at 3/26/2021  2:41 PM CDT -----  Does she know about additional mammograms?     Called pt and lmom with instructions for additional views of the left breast.  She can contact mammography as well at [phone]  As

## 2021-03-30 ENCOUNTER — TELEPHONE (OUTPATIENT)
Dept: FAMILY MEDICINE CLINIC | Facility: CLINIC | Age: 46
End: 2021-03-30

## 2021-03-30 NOTE — TELEPHONE ENCOUNTER
----- Message from John Daly MD sent at 3/29/2021  2:09 PM CDT -----  Needs additional views of the mammogram.

## 2021-03-30 NOTE — TELEPHONE ENCOUNTER
Please clarify. Does the patient need additional views or repeat diagnostic mammogram in 6 months. Thank you.

## 2021-08-29 ENCOUNTER — OFFICE VISIT (OUTPATIENT)
Dept: FAMILY MEDICINE CLINIC | Facility: CLINIC | Age: 46
End: 2021-08-29
Payer: COMMERCIAL

## 2021-08-29 VITALS
OXYGEN SATURATION: 98 % | HEIGHT: 63 IN | HEART RATE: 76 BPM | SYSTOLIC BLOOD PRESSURE: 140 MMHG | RESPIRATION RATE: 17 BRPM | WEIGHT: 220 LBS | BODY MASS INDEX: 38.98 KG/M2 | DIASTOLIC BLOOD PRESSURE: 80 MMHG | TEMPERATURE: 98 F

## 2021-08-29 DIAGNOSIS — H60.331 ACUTE SWIMMER'S EAR OF RIGHT SIDE: Primary | ICD-10-CM

## 2021-08-29 PROCEDURE — 3008F BODY MASS INDEX DOCD: CPT | Performed by: NURSE PRACTITIONER

## 2021-08-29 PROCEDURE — 3077F SYST BP >= 140 MM HG: CPT | Performed by: NURSE PRACTITIONER

## 2021-08-29 PROCEDURE — 99213 OFFICE O/P EST LOW 20 MIN: CPT | Performed by: NURSE PRACTITIONER

## 2021-08-29 PROCEDURE — 3079F DIAST BP 80-89 MM HG: CPT | Performed by: NURSE PRACTITIONER

## 2021-08-29 RX ORDER — OFLOXACIN 3 MG/ML
5 SOLUTION AURICULAR (OTIC) DAILY
Qty: 1 EACH | Refills: 0 | Status: SHIPPED | OUTPATIENT
Start: 2021-08-29 | End: 2021-09-05

## 2021-08-29 NOTE — PROGRESS NOTES
CHIEF COMPLAINT:   Patient presents with:  Ear Problem: went swimming yesterday       HPI:   Ila Michael is a 39year old female who presents to clinic today with complaints of right ear pain. Has had for 2  days.  Pain is described as pressure and p Never Used    Vaping Use      Vaping Use: Never used    Alcohol use:  Yes      Alcohol/week: 0.0 standard drinks      Comment: social / weekly    Drug use: No       REVIEW OF SYSTEMS:   GENERAL: See HPI  SKIN: no unusual skin lesions or rashes  HEENT: See H Place 5 drops into the right ear daily for 7 days. Risk and benefits of medication discussed. If antibiotics prescribed, stressed importance of completing full course of antibiotic. Acetaminophen or NSAID prn pain.       Follow up with PCP if with a towel or hair dryer after getting wet. Also, use ear plugs when swimming. · Don't stick any objects in the ear to remove wax.   · Talk with your provider about using ear drops to prevent swimmer's ear in case you feel water trapped in your ear canal

## 2021-10-07 ENCOUNTER — HOSPITAL ENCOUNTER (OUTPATIENT)
Dept: MAMMOGRAPHY | Age: 46
Discharge: HOME | End: 2021-10-07
Attending: FAMILY MEDICINE
Payer: COMMERCIAL

## 2021-10-07 ENCOUNTER — HOSPITAL ENCOUNTER (OUTPATIENT)
Dept: ULTRASOUND IMAGING | Age: 46
Discharge: HOME | End: 2021-10-07
Attending: FAMILY MEDICINE
Payer: COMMERCIAL

## 2021-10-07 DIAGNOSIS — R92.2 INCONCLUSIVE MAMMOGRAM: ICD-10-CM

## 2021-10-07 PROCEDURE — 76642 ULTRASOUND BREAST LIMITED: CPT | Performed by: FAMILY MEDICINE

## 2021-10-07 PROCEDURE — 77061 BREAST TOMOSYNTHESIS UNI: CPT | Performed by: FAMILY MEDICINE

## 2021-10-07 PROCEDURE — 77065 DX MAMMO INCL CAD UNI: CPT | Performed by: FAMILY MEDICINE

## 2021-10-07 NOTE — IMAGING NOTE
Lin Valverde is recommended for a stereotactic biopsy of the left breast by .     History:   INDICATIONS:  R92.2 Inconclusive mammogram     FINDINGS:  Focal nodular asymmetry in the upper central left breast persists on spot compression views and Sodium 88 MCG Oral Tab Take 88 mcg by mouth before breakfast.         Procedure explained and questions answered.    Wilson Child  provided with written educational material.     Ms.Travis instructed to take 1000 mg of acetaminophen on the day of the bio

## 2021-10-10 ENCOUNTER — TELEPHONE (OUTPATIENT)
Dept: FAMILY MEDICINE CLINIC | Facility: CLINIC | Age: 46
End: 2021-10-10

## 2021-10-10 DIAGNOSIS — R92.8 ABNORMAL MAMMOGRAM OF LEFT BREAST: Primary | ICD-10-CM

## 2021-10-10 NOTE — TELEPHONE ENCOUNTER
CONCLUSION:  Recommend stereotactic biopsy of nodular asymmetry in the superior central left breast.     Stereotactic biopsy recommendation was discussed with the patient at the time of her exam.       Order in system and pt informed

## 2021-10-10 NOTE — TELEPHONE ENCOUNTER
----- Message from Sherine Keith MD sent at 10/7/2021 12:36 PM CDT -----  Needs biopsy, ok to place the order, let pt know.

## 2021-10-12 ENCOUNTER — MED REC SCAN ONLY (OUTPATIENT)
Dept: FAMILY MEDICINE CLINIC | Facility: CLINIC | Age: 46
End: 2021-10-12

## 2021-10-26 ENCOUNTER — HOSPITAL ENCOUNTER (OUTPATIENT)
Dept: MAMMOGRAPHY | Facility: HOSPITAL | Age: 46
Discharge: HOME | End: 2021-10-26
Attending: FAMILY MEDICINE
Payer: COMMERCIAL

## 2021-10-26 DIAGNOSIS — R92.8 ABNORMAL MAMMOGRAM OF LEFT BREAST: ICD-10-CM

## 2021-10-26 PROCEDURE — 19499 UNLISTED PROCEDURE BREAST: CPT | Performed by: FAMILY MEDICINE

## 2021-10-26 PROCEDURE — 88305 TISSUE EXAM BY PATHOLOGIST: CPT | Performed by: FAMILY MEDICINE

## 2021-10-28 ENCOUNTER — TELEPHONE (OUTPATIENT)
Dept: GENERAL RADIOLOGY | Facility: HOSPITAL | Age: 46
End: 2021-10-28

## 2021-10-28 NOTE — IMAGING NOTE
Spoke with Daniele Needles post stereotactic left breast biopsy. Ms.  Song identified with name and date of birth. Introduced myself as breast care coordinator. Reinforced post biopsy care and instruction.   MsManisha  Song  denies any issues with biopsy

## 2021-10-29 ENCOUNTER — TELEPHONE (OUTPATIENT)
Dept: MAMMOGRAPHY | Facility: HOSPITAL | Age: 46
End: 2021-10-29

## 2021-10-29 ENCOUNTER — TELEPHONE (OUTPATIENT)
Dept: FAMILY MEDICINE CLINIC | Facility: CLINIC | Age: 46
End: 2021-10-29

## 2021-11-02 ENCOUNTER — TELEPHONE (OUTPATIENT)
Dept: MAMMOGRAPHY | Facility: HOSPITAL | Age: 46
End: 2021-11-02

## 2021-11-02 NOTE — TELEPHONE ENCOUNTER
Left detailed voicemail (#3) that pathology report was negative, viewed by pt on 10/28/21. Mychart also sent from the pt's ordering provider that wasn't viewed.  Advised that Dr Lexa Hernandez recommends a 6 month f/u mammogram and to call back with further questio

## 2021-12-15 RX ORDER — ESCITALOPRAM OXALATE 20 MG/1
TABLET ORAL
Qty: 90 TABLET | Refills: 0 | Status: SHIPPED | OUTPATIENT
Start: 2021-12-15

## 2022-03-08 RX ORDER — ESCITALOPRAM OXALATE 20 MG/1
TABLET ORAL
Qty: 90 TABLET | Refills: 3 | OUTPATIENT
Start: 2022-03-08

## 2022-03-08 NOTE — TELEPHONE ENCOUNTER
RX denied. LOV 2/22/2021 and was asked to return in 1 month for f/u. Will sent pt Owlr message to schedule appt.

## 2022-03-09 RX ORDER — ESCITALOPRAM OXALATE 20 MG/1
TABLET ORAL
Qty: 90 TABLET | Refills: 3 | OUTPATIENT
Start: 2022-03-09

## 2022-03-10 RX ORDER — ESCITALOPRAM OXALATE 20 MG/1
TABLET ORAL
Qty: 15 TABLET | Refills: 0 | OUTPATIENT
Start: 2022-03-10

## 2022-03-14 RX ORDER — ESCITALOPRAM OXALATE 20 MG/1
TABLET ORAL
Qty: 30 TABLET | Refills: 0 | Status: SHIPPED | OUTPATIENT
Start: 2022-03-14

## 2022-04-04 ENCOUNTER — OFFICE VISIT (OUTPATIENT)
Dept: FAMILY MEDICINE CLINIC | Facility: CLINIC | Age: 47
End: 2022-04-04
Payer: COMMERCIAL

## 2022-04-04 ENCOUNTER — LAB ENCOUNTER (OUTPATIENT)
Dept: LAB | Age: 47
End: 2022-04-04
Attending: FAMILY MEDICINE
Payer: COMMERCIAL

## 2022-04-04 VITALS
DIASTOLIC BLOOD PRESSURE: 84 MMHG | HEART RATE: 105 BPM | WEIGHT: 231 LBS | SYSTOLIC BLOOD PRESSURE: 132 MMHG | BODY MASS INDEX: 40.93 KG/M2 | OXYGEN SATURATION: 96 % | HEIGHT: 63 IN | RESPIRATION RATE: 18 BRPM

## 2022-04-04 DIAGNOSIS — R06.83 SNORING: ICD-10-CM

## 2022-04-04 DIAGNOSIS — R23.2 HOT FLASHES: ICD-10-CM

## 2022-04-04 DIAGNOSIS — Z13.228 SCREENING FOR ENDOCRINE, NUTRITIONAL, METABOLIC AND IMMUNITY DISORDER: ICD-10-CM

## 2022-04-04 DIAGNOSIS — I10 PRIMARY HYPERTENSION: ICD-10-CM

## 2022-04-04 DIAGNOSIS — Z13.29 SCREENING FOR ENDOCRINE, NUTRITIONAL, METABOLIC AND IMMUNITY DISORDER: ICD-10-CM

## 2022-04-04 DIAGNOSIS — Z13.0 SCREENING FOR ENDOCRINE, NUTRITIONAL, METABOLIC AND IMMUNITY DISORDER: ICD-10-CM

## 2022-04-04 DIAGNOSIS — Z00.00 ROUTINE GENERAL MEDICAL EXAMINATION AT A HEALTH CARE FACILITY: Primary | ICD-10-CM

## 2022-04-04 DIAGNOSIS — Z82.49 FAMILY HISTORY OF ATRIAL FIBRILLATION: ICD-10-CM

## 2022-04-04 DIAGNOSIS — Z13.21 SCREENING FOR ENDOCRINE, NUTRITIONAL, METABOLIC AND IMMUNITY DISORDER: ICD-10-CM

## 2022-04-04 DIAGNOSIS — Z12.31 VISIT FOR SCREENING MAMMOGRAM: ICD-10-CM

## 2022-04-04 LAB
ALBUMIN SERPL-MCNC: 3.9 G/DL (ref 3.4–5)
ALBUMIN/GLOB SERPL: 1.1 {RATIO} (ref 1–2)
ALP LIVER SERPL-CCNC: 85 U/L
ALT SERPL-CCNC: 53 U/L
ANION GAP SERPL CALC-SCNC: 3 MMOL/L (ref 0–18)
AST SERPL-CCNC: 26 U/L (ref 15–37)
BASOPHILS # BLD AUTO: 0.07 X10(3) UL (ref 0–0.2)
BASOPHILS NFR BLD AUTO: 1.2 %
BILIRUB SERPL-MCNC: 0.3 MG/DL (ref 0.1–2)
BUN BLD-MCNC: 21 MG/DL (ref 7–18)
CALCIUM BLD-MCNC: 9.7 MG/DL (ref 8.5–10.1)
CHLORIDE SERPL-SCNC: 102 MMOL/L (ref 98–112)
CHOLEST SERPL-MCNC: 247 MG/DL (ref ?–200)
CO2 SERPL-SCNC: 30 MMOL/L (ref 21–32)
CREAT BLD-MCNC: 0.86 MG/DL
EOSINOPHIL # BLD AUTO: 0.11 X10(3) UL (ref 0–0.7)
EOSINOPHIL NFR BLD AUTO: 1.8 %
ERYTHROCYTE [DISTWIDTH] IN BLOOD BY AUTOMATED COUNT: 13.2 %
FASTING PATIENT LIPID ANSWER: NO
FASTING STATUS PATIENT QL REPORTED: NO
FSH SERPL-ACNC: 36.8 MIU/ML
GLOBULIN PLAS-MCNC: 3.7 G/DL (ref 2.8–4.4)
GLUCOSE BLD-MCNC: 105 MG/DL (ref 70–99)
HCT VFR BLD AUTO: 43.2 %
HDLC SERPL-MCNC: 53 MG/DL (ref 40–59)
HGB BLD-MCNC: 13.8 G/DL
IMM GRANULOCYTES # BLD AUTO: 0.02 X10(3) UL (ref 0–1)
IMM GRANULOCYTES NFR BLD: 0.3 %
LDLC SERPL CALC-MCNC: 146 MG/DL (ref ?–100)
LH SERPL-ACNC: 22.1 MIU/ML
LYMPHOCYTES # BLD AUTO: 1.59 X10(3) UL (ref 1–4)
LYMPHOCYTES NFR BLD AUTO: 26.2 %
MCH RBC QN AUTO: 28.7 PG (ref 26–34)
MCHC RBC AUTO-ENTMCNC: 31.9 G/DL (ref 31–37)
MCV RBC AUTO: 89.8 FL
MONOCYTES # BLD AUTO: 0.5 X10(3) UL (ref 0.1–1)
MONOCYTES NFR BLD AUTO: 8.3 %
NEUTROPHILS # BLD AUTO: 3.77 X10 (3) UL (ref 1.5–7.7)
NEUTROPHILS # BLD AUTO: 3.77 X10(3) UL (ref 1.5–7.7)
NEUTROPHILS NFR BLD AUTO: 62.2 %
NONHDLC SERPL-MCNC: 194 MG/DL (ref ?–130)
OSMOLALITY SERPL CALC.SUM OF ELEC: 283 MOSM/KG (ref 275–295)
PLATELET # BLD AUTO: 317 10(3)UL (ref 150–450)
POTASSIUM SERPL-SCNC: 4.3 MMOL/L (ref 3.5–5.1)
PROT SERPL-MCNC: 7.6 G/DL (ref 6.4–8.2)
RBC # BLD AUTO: 4.81 X10(6)UL
SODIUM SERPL-SCNC: 135 MMOL/L (ref 136–145)
T3FREE SERPL-MCNC: 3.04 PG/ML (ref 2.4–4.2)
T4 FREE SERPL-MCNC: 0.9 NG/DL (ref 0.8–1.7)
TRIGL SERPL-MCNC: 266 MG/DL (ref 30–149)
TSI SER-ACNC: 0.3 MIU/ML (ref 0.36–3.74)
VIT D+METAB SERPL-MCNC: 49.3 NG/ML (ref 30–100)
VLDLC SERPL CALC-MCNC: 51 MG/DL (ref 0–30)
WBC # BLD AUTO: 6.1 X10(3) UL (ref 4–11)

## 2022-04-04 PROCEDURE — 83001 ASSAY OF GONADOTROPIN (FSH): CPT

## 2022-04-04 PROCEDURE — 80061 LIPID PANEL: CPT

## 2022-04-04 PROCEDURE — 80053 COMPREHEN METABOLIC PANEL: CPT

## 2022-04-04 PROCEDURE — 36415 COLL VENOUS BLD VENIPUNCTURE: CPT

## 2022-04-04 PROCEDURE — 83002 ASSAY OF GONADOTROPIN (LH): CPT

## 2022-04-04 PROCEDURE — 99396 PREV VISIT EST AGE 40-64: CPT | Performed by: FAMILY MEDICINE

## 2022-04-04 PROCEDURE — 84481 FREE ASSAY (FT-3): CPT

## 2022-04-04 PROCEDURE — 84439 ASSAY OF FREE THYROXINE: CPT

## 2022-04-04 PROCEDURE — 84443 ASSAY THYROID STIM HORMONE: CPT

## 2022-04-04 PROCEDURE — 82306 VITAMIN D 25 HYDROXY: CPT

## 2022-04-04 PROCEDURE — 3008F BODY MASS INDEX DOCD: CPT | Performed by: FAMILY MEDICINE

## 2022-04-04 PROCEDURE — 3075F SYST BP GE 130 - 139MM HG: CPT | Performed by: FAMILY MEDICINE

## 2022-04-04 PROCEDURE — 3079F DIAST BP 80-89 MM HG: CPT | Performed by: FAMILY MEDICINE

## 2022-04-04 PROCEDURE — 85025 COMPLETE CBC W/AUTO DIFF WBC: CPT

## 2022-04-06 ENCOUNTER — TELEPHONE (OUTPATIENT)
Dept: FAMILY MEDICINE CLINIC | Facility: CLINIC | Age: 47
End: 2022-04-06

## 2022-04-06 NOTE — TELEPHONE ENCOUNTER
----- Message from Allean Sicard, MD sent at 4/5/2022  4:49 PM CDT -----  Abnormal results patient needs to make an appointment to f/u.
Koeme[employer] message sent to pt with recommendation from provider.
"Right lung surgery"

## 2022-04-08 ENCOUNTER — HOSPITAL ENCOUNTER (OUTPATIENT)
Dept: MAMMOGRAPHY | Age: 47
Discharge: HOME | End: 2022-04-08
Attending: FAMILY MEDICINE
Payer: COMMERCIAL

## 2022-04-08 ENCOUNTER — TELEPHONE (OUTPATIENT)
Dept: FAMILY MEDICINE CLINIC | Facility: CLINIC | Age: 47
End: 2022-04-08

## 2022-04-08 DIAGNOSIS — Z12.31 VISIT FOR SCREENING MAMMOGRAM: ICD-10-CM

## 2022-04-08 PROCEDURE — 77063 BREAST TOMOSYNTHESIS BI: CPT | Performed by: FAMILY MEDICINE

## 2022-04-08 PROCEDURE — 77067 SCR MAMMO BI INCL CAD: CPT | Performed by: FAMILY MEDICINE

## 2022-04-13 ENCOUNTER — OFFICE VISIT (OUTPATIENT)
Dept: SURGERY | Facility: CLINIC | Age: 47
End: 2022-04-13
Payer: COMMERCIAL

## 2022-04-13 VITALS
WEIGHT: 227 LBS | HEART RATE: 103 BPM | HEIGHT: 63 IN | DIASTOLIC BLOOD PRESSURE: 93 MMHG | SYSTOLIC BLOOD PRESSURE: 161 MMHG | TEMPERATURE: 98 F | BODY MASS INDEX: 40.22 KG/M2

## 2022-04-13 DIAGNOSIS — Z12.11 ENCOUNTER FOR SCREENING COLONOSCOPY: Primary | ICD-10-CM

## 2022-04-13 DIAGNOSIS — K42.9 UMBILICAL HERNIA WITHOUT OBSTRUCTION AND WITHOUT GANGRENE: ICD-10-CM

## 2022-04-13 PROCEDURE — 3008F BODY MASS INDEX DOCD: CPT | Performed by: SURGERY

## 2022-04-13 PROCEDURE — 3080F DIAST BP >= 90 MM HG: CPT | Performed by: SURGERY

## 2022-04-13 PROCEDURE — 99243 OFF/OP CNSLTJ NEW/EST LOW 30: CPT | Performed by: SURGERY

## 2022-04-13 PROCEDURE — 3077F SYST BP >= 140 MM HG: CPT | Performed by: SURGERY

## 2022-04-24 RX ORDER — ESCITALOPRAM OXALATE 20 MG/1
TABLET ORAL
Qty: 30 TABLET | Refills: 11 | Status: SHIPPED | OUTPATIENT
Start: 2022-04-24

## 2022-04-28 ENCOUNTER — OFFICE VISIT (OUTPATIENT)
Dept: SLEEP CENTER | Age: 47
End: 2022-04-28
Attending: INTERNAL MEDICINE
Payer: COMMERCIAL

## 2022-04-28 DIAGNOSIS — Z82.49 FAMILY HISTORY OF ATRIAL FIBRILLATION: ICD-10-CM

## 2022-04-28 PROCEDURE — 95806 SLEEP STUDY UNATT&RESP EFFT: CPT

## 2022-05-02 ENCOUNTER — OFFICE VISIT (OUTPATIENT)
Dept: OBGYN CLINIC | Facility: CLINIC | Age: 47
End: 2022-05-02
Payer: COMMERCIAL

## 2022-05-02 VITALS
HEART RATE: 77 BPM | DIASTOLIC BLOOD PRESSURE: 80 MMHG | SYSTOLIC BLOOD PRESSURE: 138 MMHG | HEIGHT: 63 IN | WEIGHT: 232.63 LBS | BODY MASS INDEX: 41.22 KG/M2

## 2022-05-02 DIAGNOSIS — Z01.419 WELL WOMAN EXAM WITH ROUTINE GYNECOLOGICAL EXAM: Primary | ICD-10-CM

## 2022-05-02 DIAGNOSIS — Z12.4 CERVICAL CANCER SCREENING: ICD-10-CM

## 2022-05-02 DIAGNOSIS — N95.1 VASOMOTOR SYMPTOMS DUE TO MENOPAUSE: ICD-10-CM

## 2022-05-02 DIAGNOSIS — R79.89 HIGH SERUM FOLLICLE STIMULATING HORMONE (FSH): ICD-10-CM

## 2022-05-02 PROCEDURE — 87624 HPV HI-RISK TYP POOLED RSLT: CPT | Performed by: OBSTETRICS & GYNECOLOGY

## 2022-05-02 PROCEDURE — 88175 CYTOPATH C/V AUTO FLUID REDO: CPT | Performed by: OBSTETRICS & GYNECOLOGY

## 2022-05-02 RX ORDER — ALPRAZOLAM 0.5 MG/1
0.5 TABLET ORAL NIGHTLY PRN
Qty: 30 TABLET | Refills: 3 | Status: SHIPPED | OUTPATIENT
Start: 2022-05-02

## 2022-05-02 RX ORDER — DROSPIRENONE AND ETHINYL ESTRADIOL 0.02-3(28)
1 KIT ORAL DAILY
Qty: 28 TABLET | Refills: 11 | Status: SHIPPED | OUTPATIENT
Start: 2022-05-02 | End: 2023-05-02

## 2022-05-06 LAB — HPV I/H RISK 1 DNA SPEC QL NAA+PROBE: NEGATIVE

## 2022-05-13 ENCOUNTER — OFFICE VISIT (OUTPATIENT)
Dept: FAMILY MEDICINE CLINIC | Facility: CLINIC | Age: 47
End: 2022-05-13
Payer: COMMERCIAL

## 2022-05-13 VITALS
SYSTOLIC BLOOD PRESSURE: 139 MMHG | HEART RATE: 99 BPM | BODY MASS INDEX: 41 KG/M2 | WEIGHT: 234 LBS | RESPIRATION RATE: 17 BRPM | DIASTOLIC BLOOD PRESSURE: 82 MMHG | OXYGEN SATURATION: 96 %

## 2022-05-13 DIAGNOSIS — E03.9 ACQUIRED HYPOTHYROIDISM: ICD-10-CM

## 2022-05-13 DIAGNOSIS — E78.2 MIXED HYPERLIPIDEMIA: ICD-10-CM

## 2022-05-13 DIAGNOSIS — R03.0 ELEVATED BLOOD PRESSURE READING: Primary | ICD-10-CM

## 2022-05-13 PROCEDURE — 3079F DIAST BP 80-89 MM HG: CPT | Performed by: FAMILY MEDICINE

## 2022-05-13 PROCEDURE — 3075F SYST BP GE 130 - 139MM HG: CPT | Performed by: FAMILY MEDICINE

## 2022-05-13 PROCEDURE — 99214 OFFICE O/P EST MOD 30 MIN: CPT | Performed by: FAMILY MEDICINE

## 2022-05-26 ENCOUNTER — OFFICE VISIT (OUTPATIENT)
Dept: INTERNAL MEDICINE CLINIC | Facility: CLINIC | Age: 47
End: 2022-05-26
Payer: COMMERCIAL

## 2022-05-26 VITALS
RESPIRATION RATE: 16 BRPM | WEIGHT: 228 LBS | HEART RATE: 102 BPM | BODY MASS INDEX: 40.4 KG/M2 | DIASTOLIC BLOOD PRESSURE: 82 MMHG | SYSTOLIC BLOOD PRESSURE: 130 MMHG | HEIGHT: 63 IN

## 2022-05-26 DIAGNOSIS — R73.01 IFG (IMPAIRED FASTING GLUCOSE): ICD-10-CM

## 2022-05-26 DIAGNOSIS — E78.2 MIXED HYPERLIPIDEMIA: ICD-10-CM

## 2022-05-26 DIAGNOSIS — E03.9 ACQUIRED HYPOTHYROIDISM: ICD-10-CM

## 2022-05-26 DIAGNOSIS — E66.01 CLASS 3 SEVERE OBESITY WITH BODY MASS INDEX (BMI) OF 40.0 TO 44.9 IN ADULT, UNSPECIFIED OBESITY TYPE, UNSPECIFIED WHETHER SERIOUS COMORBIDITY PRESENT (HCC): ICD-10-CM

## 2022-05-26 DIAGNOSIS — Z51.81 ENCOUNTER FOR THERAPEUTIC DRUG MONITORING: Primary | ICD-10-CM

## 2022-05-26 PROCEDURE — 99204 OFFICE O/P NEW MOD 45 MIN: CPT | Performed by: PHYSICIAN ASSISTANT

## 2022-05-26 PROCEDURE — 3079F DIAST BP 80-89 MM HG: CPT | Performed by: PHYSICIAN ASSISTANT

## 2022-05-26 PROCEDURE — 3075F SYST BP GE 130 - 139MM HG: CPT | Performed by: PHYSICIAN ASSISTANT

## 2022-05-26 PROCEDURE — 3008F BODY MASS INDEX DOCD: CPT | Performed by: PHYSICIAN ASSISTANT

## 2022-05-26 RX ORDER — LIRAGLUTIDE 6 MG/ML
3 INJECTION, SOLUTION SUBCUTANEOUS DAILY
Qty: 15 ML | Refills: 0 | Status: SHIPPED | OUTPATIENT
Start: 2022-05-26 | End: 2022-06-25

## 2022-05-26 RX ORDER — PEN NEEDLE, DIABETIC 30 GX3/16"
1 NEEDLE, DISPOSABLE MISCELLANEOUS DAILY
Qty: 90 EACH | Refills: 2 | Status: SHIPPED | OUTPATIENT
Start: 2022-05-26 | End: 2022-06-25

## 2022-05-26 RX ORDER — LIRAGLUTIDE 6 MG/ML
INJECTION, SOLUTION SUBCUTANEOUS
COMMUNITY

## 2022-06-01 ENCOUNTER — TELEPHONE (OUTPATIENT)
Dept: SURGERY | Facility: CLINIC | Age: 47
End: 2022-06-01

## 2022-06-06 RX ORDER — POLYETHYLENE GLYCOL 3350, SODIUM CHLORIDE, SODIUM BICARBONATE, POTASSIUM CHLORIDE 420; 11.2; 5.72; 1.48 G/4L; G/4L; G/4L; G/4L
POWDER, FOR SOLUTION ORAL
Qty: 1 EACH | Refills: 0 | Status: SHIPPED | OUTPATIENT
Start: 2022-06-06

## 2022-06-10 ENCOUNTER — LAB REQUISITION (OUTPATIENT)
Age: 47
End: 2022-06-10
Payer: COMMERCIAL

## 2022-06-10 DIAGNOSIS — Z12.11 COLON CANCER SCREENING: ICD-10-CM

## 2022-06-10 PROCEDURE — 88305 TISSUE EXAM BY PATHOLOGIST: CPT | Performed by: SURGERY

## 2022-06-11 ENCOUNTER — MED REC SCAN ONLY (OUTPATIENT)
Dept: FAMILY MEDICINE CLINIC | Facility: CLINIC | Age: 47
End: 2022-06-11

## 2022-06-14 NOTE — PROGRESS NOTES
OK to call patient with result. (normal)  She will need a colonoscopy in 10 years. Please place in the recall system.

## 2022-06-16 ENCOUNTER — PATIENT OUTREACH (OUTPATIENT)
Facility: LOCATION | Age: 47
End: 2022-06-16

## 2022-06-28 NOTE — TELEPHONE ENCOUNTER
Patient left message to call her back. She needs a refill but needs it sent to mail order. Did not specify what med or which pharmacy. She needs Saxenda sent to mail order. She is currently on 1.8 mg  titrating slowly as she has some issues with side effects  Med is working well. She is scheduled for her f/u but could not get in until August.    3 month RX is about the same cost as one month. Will help her save.     Requesting Saxenda  LOV: 5/2/22  RTC: one month  Last Relevant Labs: na  Filled: 5/26/22 #15 ml with 0 refills    Future Appointments   Date Time Provider Nadege Bowen   8/11/2022  8:20 AM Amanda Altamirano PA-C [address] [facility] [address]

## 2022-06-30 RX ORDER — LIRAGLUTIDE 6 MG/ML
3 INJECTION, SOLUTION SUBCUTANEOUS DAILY
Qty: 45 ML | Refills: 0 | Status: SHIPPED | OUTPATIENT
Start: 2022-06-30

## 2022-06-30 RX ORDER — LIRAGLUTIDE 6 MG/ML
INJECTION, SOLUTION SUBCUTANEOUS
Qty: 15 ML | Refills: 1 | Status: SHIPPED | OUTPATIENT
Start: 2022-06-30

## 2022-06-30 NOTE — TELEPHONE ENCOUNTER
Requesting Saxenda  LOV: 5/26/22  RTC: not noted  Last Relevant Labs: na  Filled: 5/26/22 #15ml with 0 refills    Future Appointments   Date Time Provider Nadege Jessi   8/11/2022  8:20 AM Jaxon Garcia PA-C [address] [facility] [address]   8/30/2022 10:00 AM Jaspal Rae MD [facility] [address]

## 2022-08-10 ENCOUNTER — LABORATORY ENCOUNTER (OUTPATIENT)
Dept: LAB | Age: 47
End: 2022-08-10
Attending: PHYSICIAN ASSISTANT
Payer: COMMERCIAL

## 2022-08-10 DIAGNOSIS — E66.01 CLASS 3 SEVERE OBESITY WITH BODY MASS INDEX (BMI) OF 40.0 TO 44.9 IN ADULT, UNSPECIFIED OBESITY TYPE, UNSPECIFIED WHETHER SERIOUS COMORBIDITY PRESENT (HCC): ICD-10-CM

## 2022-08-10 DIAGNOSIS — R73.01 IFG (IMPAIRED FASTING GLUCOSE): ICD-10-CM

## 2022-08-10 DIAGNOSIS — E78.2 MIXED HYPERLIPIDEMIA: ICD-10-CM

## 2022-08-10 DIAGNOSIS — Z51.81 ENCOUNTER FOR THERAPEUTIC DRUG MONITORING: ICD-10-CM

## 2022-08-10 DIAGNOSIS — E03.9 ACQUIRED HYPOTHYROIDISM: ICD-10-CM

## 2022-08-10 LAB
EST. AVERAGE GLUCOSE BLD GHB EST-MCNC: 117 MG/DL (ref 68–126)
HBA1C MFR BLD: 5.7 % (ref ?–5.7)

## 2022-08-10 PROCEDURE — 83036 HEMOGLOBIN GLYCOSYLATED A1C: CPT | Performed by: PHYSICIAN ASSISTANT

## 2022-08-11 ENCOUNTER — OFFICE VISIT (OUTPATIENT)
Dept: INTERNAL MEDICINE CLINIC | Facility: CLINIC | Age: 47
End: 2022-08-11
Payer: COMMERCIAL

## 2022-08-11 VITALS
SYSTOLIC BLOOD PRESSURE: 130 MMHG | HEART RATE: 80 BPM | BODY MASS INDEX: 37.56 KG/M2 | HEIGHT: 63 IN | RESPIRATION RATE: 16 BRPM | DIASTOLIC BLOOD PRESSURE: 84 MMHG | WEIGHT: 212 LBS

## 2022-08-11 DIAGNOSIS — E66.01 CLASS 3 SEVERE OBESITY WITH BODY MASS INDEX (BMI) OF 40.0 TO 44.9 IN ADULT, UNSPECIFIED OBESITY TYPE, UNSPECIFIED WHETHER SERIOUS COMORBIDITY PRESENT (HCC): ICD-10-CM

## 2022-08-11 DIAGNOSIS — Z51.81 ENCOUNTER FOR THERAPEUTIC DRUG MONITORING: Primary | ICD-10-CM

## 2022-08-11 DIAGNOSIS — R73.01 IFG (IMPAIRED FASTING GLUCOSE): ICD-10-CM

## 2022-08-11 DIAGNOSIS — E78.2 MIXED HYPERLIPIDEMIA: ICD-10-CM

## 2022-08-11 PROCEDURE — 3075F SYST BP GE 130 - 139MM HG: CPT | Performed by: PHYSICIAN ASSISTANT

## 2022-08-11 PROCEDURE — 3008F BODY MASS INDEX DOCD: CPT | Performed by: PHYSICIAN ASSISTANT

## 2022-08-11 PROCEDURE — 3079F DIAST BP 80-89 MM HG: CPT | Performed by: PHYSICIAN ASSISTANT

## 2022-08-11 PROCEDURE — 99213 OFFICE O/P EST LOW 20 MIN: CPT | Performed by: PHYSICIAN ASSISTANT

## 2022-08-11 RX ORDER — LIRAGLUTIDE 6 MG/ML
3 INJECTION, SOLUTION SUBCUTANEOUS DAILY
Qty: 45 ML | Refills: 0 | Status: SHIPPED | OUTPATIENT
Start: 2022-08-11

## 2022-08-29 DIAGNOSIS — G47.00 INSOMNIA, UNSPECIFIED TYPE: ICD-10-CM

## 2022-08-29 RX ORDER — ALPRAZOLAM 0.5 MG/1
0.5 TABLET ORAL NIGHTLY PRN
Qty: 30 TABLET | Refills: 3 | Status: SHIPPED | OUTPATIENT
Start: 2022-08-29

## 2022-11-03 ENCOUNTER — TELEMEDICINE (OUTPATIENT)
Dept: INTERNAL MEDICINE CLINIC | Facility: CLINIC | Age: 47
End: 2022-11-03

## 2022-11-03 DIAGNOSIS — E66.01 CLASS 3 SEVERE OBESITY WITH BODY MASS INDEX (BMI) OF 40.0 TO 44.9 IN ADULT, UNSPECIFIED OBESITY TYPE, UNSPECIFIED WHETHER SERIOUS COMORBIDITY PRESENT (HCC): ICD-10-CM

## 2022-11-03 DIAGNOSIS — R73.01 IFG (IMPAIRED FASTING GLUCOSE): ICD-10-CM

## 2022-11-03 DIAGNOSIS — Z51.81 ENCOUNTER FOR THERAPEUTIC DRUG MONITORING: Primary | ICD-10-CM

## 2022-11-03 DIAGNOSIS — E78.2 MIXED HYPERLIPIDEMIA: ICD-10-CM

## 2022-11-03 RX ORDER — LIRAGLUTIDE 6 MG/ML
3 INJECTION, SOLUTION SUBCUTANEOUS DAILY
Qty: 45 ML | Refills: 0 | Status: SHIPPED | OUTPATIENT
Start: 2022-11-03

## 2022-11-03 RX ORDER — NALTREXONE HYDROCHLORIDE 50 MG/1
25 TABLET, FILM COATED ORAL DAILY
Qty: 30 TABLET | Refills: 0 | Status: SHIPPED | OUTPATIENT
Start: 2022-11-03

## 2022-11-03 RX ORDER — NALTREXONE HYDROCHLORIDE 50 MG/1
25 TABLET, FILM COATED ORAL DAILY
Qty: 90 TABLET | Refills: 0 | Status: SHIPPED | OUTPATIENT
Start: 2022-11-03

## 2022-12-28 ENCOUNTER — PATIENT MESSAGE (OUTPATIENT)
Dept: FAMILY MEDICINE CLINIC | Facility: CLINIC | Age: 47
End: 2022-12-28

## 2022-12-28 NOTE — TELEPHONE ENCOUNTER
From: Kelsy Katz  To: Carroll Plaza MD  Sent: 12/28/2022 2:58 PM CST  Subject: Refill of Birth Control    Dr Jenkins prescribed [facility] birth control to help with menopause symptoms. He is no longer practicing in the [address] [place] [address],[address], so I was wondering if you could send in a prescription to [facility]?     Thank you   Geoff Luong

## 2023-01-04 ENCOUNTER — LAB ENCOUNTER (OUTPATIENT)
Dept: LAB | Age: 48
End: 2023-01-04
Attending: FAMILY MEDICINE
Payer: COMMERCIAL

## 2023-01-04 ENCOUNTER — TELEPHONE (OUTPATIENT)
Dept: FAMILY MEDICINE CLINIC | Facility: CLINIC | Age: 48
End: 2023-01-04

## 2023-01-04 ENCOUNTER — OFFICE VISIT (OUTPATIENT)
Dept: FAMILY MEDICINE CLINIC | Facility: CLINIC | Age: 48
End: 2023-01-04
Payer: COMMERCIAL

## 2023-01-04 VITALS
BODY MASS INDEX: 35.79 KG/M2 | OXYGEN SATURATION: 97 % | HEART RATE: 84 BPM | HEIGHT: 63 IN | RESPIRATION RATE: 18 BRPM | SYSTOLIC BLOOD PRESSURE: 122 MMHG | DIASTOLIC BLOOD PRESSURE: 84 MMHG | WEIGHT: 202 LBS

## 2023-01-04 DIAGNOSIS — R14.0 BLOATING: ICD-10-CM

## 2023-01-04 DIAGNOSIS — R10.84 GENERALIZED ABDOMINAL PAIN: ICD-10-CM

## 2023-01-04 DIAGNOSIS — R10.84 GENERALIZED ABDOMINAL PAIN: Primary | ICD-10-CM

## 2023-01-04 LAB
ALBUMIN SERPL-MCNC: 3.9 G/DL (ref 3.4–5)
ALP LIVER SERPL-CCNC: 51 U/L
ALT SERPL-CCNC: 27 U/L
AST SERPL-CCNC: 18 U/L (ref 15–37)
BASOPHILS # BLD AUTO: 0.04 X10(3) UL (ref 0–0.2)
BASOPHILS NFR BLD AUTO: 0.8 %
BILIRUB DIRECT SERPL-MCNC: <0.1 MG/DL (ref 0–0.2)
BILIRUB SERPL-MCNC: 0.5 MG/DL (ref 0.1–2)
EOSINOPHIL # BLD AUTO: 0.1 X10(3) UL (ref 0–0.7)
EOSINOPHIL NFR BLD AUTO: 2 %
ERYTHROCYTE [DISTWIDTH] IN BLOOD BY AUTOMATED COUNT: 12.4 %
HCT VFR BLD AUTO: 45.7 %
HGB BLD-MCNC: 15.1 G/DL
IGA SERPL-MCNC: 189 MG/DL (ref 70–312)
IMM GRANULOCYTES # BLD AUTO: 0.01 X10(3) UL (ref 0–1)
IMM GRANULOCYTES NFR BLD: 0.2 %
LIPASE SERPL-CCNC: 205 U/L (ref 73–393)
LYMPHOCYTES # BLD AUTO: 1.48 X10(3) UL (ref 1–4)
LYMPHOCYTES NFR BLD AUTO: 30.1 %
MCH RBC QN AUTO: 30 PG (ref 26–34)
MCHC RBC AUTO-ENTMCNC: 33 G/DL (ref 31–37)
MCV RBC AUTO: 90.7 FL
MONOCYTES # BLD AUTO: 0.39 X10(3) UL (ref 0.1–1)
MONOCYTES NFR BLD AUTO: 7.9 %
NEUTROPHILS # BLD AUTO: 2.9 X10 (3) UL (ref 1.5–7.7)
NEUTROPHILS # BLD AUTO: 2.9 X10(3) UL (ref 1.5–7.7)
NEUTROPHILS NFR BLD AUTO: 59 %
PLATELET # BLD AUTO: 325 10(3)UL (ref 150–450)
PROT SERPL-MCNC: 8 G/DL (ref 6.4–8.2)
RBC # BLD AUTO: 5.04 X10(6)UL
WBC # BLD AUTO: 4.9 X10(3) UL (ref 4–11)

## 2023-01-04 PROCEDURE — 86003 ALLG SPEC IGE CRUDE XTRC EA: CPT | Performed by: FAMILY MEDICINE

## 2023-01-04 PROCEDURE — 3079F DIAST BP 80-89 MM HG: CPT | Performed by: FAMILY MEDICINE

## 2023-01-04 PROCEDURE — 99214 OFFICE O/P EST MOD 30 MIN: CPT | Performed by: FAMILY MEDICINE

## 2023-01-04 PROCEDURE — 80076 HEPATIC FUNCTION PANEL: CPT | Performed by: FAMILY MEDICINE

## 2023-01-04 PROCEDURE — 3008F BODY MASS INDEX DOCD: CPT | Performed by: FAMILY MEDICINE

## 2023-01-04 PROCEDURE — 83690 ASSAY OF LIPASE: CPT | Performed by: FAMILY MEDICINE

## 2023-01-04 PROCEDURE — 82785 ASSAY OF IGE: CPT | Performed by: FAMILY MEDICINE

## 2023-01-04 PROCEDURE — 3074F SYST BP LT 130 MM HG: CPT | Performed by: FAMILY MEDICINE

## 2023-01-04 PROCEDURE — 83013 H PYLORI (C-13) BREATH: CPT | Performed by: FAMILY MEDICINE

## 2023-01-04 PROCEDURE — 85025 COMPLETE CBC W/AUTO DIFF WBC: CPT | Performed by: FAMILY MEDICINE

## 2023-01-04 PROCEDURE — 82784 ASSAY IGA/IGD/IGG/IGM EACH: CPT | Performed by: FAMILY MEDICINE

## 2023-01-04 PROCEDURE — 86256 FLUORESCENT ANTIBODY TITER: CPT | Performed by: FAMILY MEDICINE

## 2023-01-04 PROCEDURE — 86364 TISS TRNSGLTMNASE EA IG CLAS: CPT | Performed by: FAMILY MEDICINE

## 2023-01-04 RX ORDER — DROSPIRENONE AND ETHINYL ESTRADIOL 0.02-3(28)
1 KIT ORAL DAILY
COMMUNITY
End: 2023-01-04

## 2023-01-04 RX ORDER — DROSPIRENONE AND ETHINYL ESTRADIOL 0.02-3(28)
1 KIT ORAL DAILY
Qty: 84 TABLET | Refills: 3 | Status: SHIPPED | OUTPATIENT
Start: 2023-01-04 | End: 2023-01-05

## 2023-01-04 RX ORDER — LEVOTHYROXINE SODIUM 0.07 MG/1
75 TABLET ORAL DAILY
COMMUNITY
Start: 2022-12-06

## 2023-01-04 NOTE — TELEPHONE ENCOUNTER
[facility] if [facility] should be taking AVANI TAB 3-0.02mg and PROGESTERONE CAP 100mg that was filled and rx'd by Razia Aldana. Please advise, thank you!

## 2023-01-05 RX ORDER — DROSPIRENONE AND ETHINYL ESTRADIOL 0.02-3(28)
1 KIT ORAL DAILY
Qty: 84 TABLET | Refills: 3 | Status: SHIPPED | OUTPATIENT
Start: 2023-01-05

## 2023-01-05 NOTE — TELEPHONE ENCOUNTER
Rx re-sent with orders to cancel Progesterone Rx. Message sent to pt via fsboWOW with below instructions.

## 2023-01-06 ENCOUNTER — TELEPHONE (OUTPATIENT)
Dept: FAMILY MEDICINE CLINIC | Facility: CLINIC | Age: 48
End: 2023-01-06

## 2023-01-06 LAB
CLAM IGE QN: <0.1 KUA/L (ref ?–0.1)
CODFISH IGE QN: <0.1 KUA/L (ref ?–0.1)
CORN IGE QN: <0.1 KUA/L (ref ?–0.1)
COW MILK IGE QN: <0.1 KUA/L (ref ?–0.1)
EGG WHITE IGE QN: <0.1 KUA/L (ref ?–0.1)
IGE SERPL-ACNC: 12.6 KU/L (ref 2–214)
PEANUT IGE QN: <0.1 KUA/L (ref ?–0.1)
SCALLOP IGE QN: <0.1 KUA/L (ref ?–0.1)
SESAME SEED IGE QN: <0.1 KUA/L (ref ?–0.1)
SHRIMP IGE QN: <0.1 KUA/L (ref ?–0.1)
SOYBEAN IGE QN: <0.1 KUA/L (ref ?–0.1)
TTG IGA SER-ACNC: 0.5 U/ML (ref ?–7)
WALNUT IGE QN: <0.1 KUA/L (ref ?–0.1)
WHEAT IGE QN: <0.1 KUA/L (ref ?–0.1)

## 2023-01-06 NOTE — TELEPHONE ENCOUNTER
Brammo message sent regarding results, and recommendation as noted below. ----- Message from Vishal Green MD sent at 1/6/2023  9:22 AM CST -----  Normal results.

## 2023-01-07 LAB — H. PYLORI BREATH TEST: NEGATIVE

## 2023-01-24 ENCOUNTER — TELEPHONE (OUTPATIENT)
Dept: FAMILY MEDICINE CLINIC | Facility: CLINIC | Age: 48
End: 2023-01-24

## 2023-01-24 ENCOUNTER — HOSPITAL ENCOUNTER (OUTPATIENT)
Dept: ULTRASOUND IMAGING | Age: 48
Discharge: HOME | End: 2023-01-24
Attending: FAMILY MEDICINE
Payer: COMMERCIAL

## 2023-01-24 DIAGNOSIS — R14.0 BLOATING: ICD-10-CM

## 2023-01-24 DIAGNOSIS — R10.84 GENERALIZED ABDOMINAL PAIN: ICD-10-CM

## 2023-01-24 PROCEDURE — 76700 US EXAM ABDOM COMPLETE: CPT | Performed by: FAMILY MEDICINE

## 2023-01-24 NOTE — TELEPHONE ENCOUNTER
----- Message from Roverto Razo MD sent at 1/24/2023 12:08 PM CST -----  Stable hemangioma, sometimes birth control pill can increase size of hemangioma, pt should think about stopping it.

## 2023-02-02 ENCOUNTER — OFFICE VISIT (OUTPATIENT)
Dept: INTERNAL MEDICINE CLINIC | Facility: CLINIC | Age: 48
End: 2023-02-02
Payer: COMMERCIAL

## 2023-02-02 VITALS
BODY MASS INDEX: 35.61 KG/M2 | HEIGHT: 63 IN | WEIGHT: 201 LBS | RESPIRATION RATE: 16 BRPM | DIASTOLIC BLOOD PRESSURE: 70 MMHG | SYSTOLIC BLOOD PRESSURE: 110 MMHG

## 2023-02-02 DIAGNOSIS — E03.9 ACQUIRED HYPOTHYROIDISM: ICD-10-CM

## 2023-02-02 DIAGNOSIS — F41.9 ANXIETY: ICD-10-CM

## 2023-02-02 DIAGNOSIS — Z51.81 ENCOUNTER FOR THERAPEUTIC DRUG MONITORING: Primary | ICD-10-CM

## 2023-02-02 DIAGNOSIS — E78.2 MIXED HYPERLIPIDEMIA: ICD-10-CM

## 2023-02-02 DIAGNOSIS — E66.01 CLASS 2 SEVERE OBESITY WITH SERIOUS COMORBIDITY AND BODY MASS INDEX (BMI) OF 35.0 TO 35.9 IN ADULT, UNSPECIFIED OBESITY TYPE (HCC): ICD-10-CM

## 2023-02-02 DIAGNOSIS — R73.01 IFG (IMPAIRED FASTING GLUCOSE): ICD-10-CM

## 2023-02-02 PROCEDURE — 3074F SYST BP LT 130 MM HG: CPT | Performed by: PHYSICIAN ASSISTANT

## 2023-02-02 PROCEDURE — 3008F BODY MASS INDEX DOCD: CPT | Performed by: PHYSICIAN ASSISTANT

## 2023-02-02 PROCEDURE — 99214 OFFICE O/P EST MOD 30 MIN: CPT | Performed by: PHYSICIAN ASSISTANT

## 2023-02-02 PROCEDURE — 3078F DIAST BP <80 MM HG: CPT | Performed by: PHYSICIAN ASSISTANT

## 2023-03-05 NOTE — TELEPHONE ENCOUNTER
Requesting [place]  LOV: 2/2/23  RTC: not noted  Last Relevant Labs: na  Filled: 11/3/22 #45 ml with 0 refills    Future Appointments   Date Time Provider Nadege Bowen   5/4/2023  8:00 AM Hillary Hernandez PA-C [address] [facility] [address]

## 2023-03-07 RX ORDER — LIRAGLUTIDE 6 MG/ML
INJECTION, SOLUTION SUBCUTANEOUS
Qty: 45 ML | Refills: 1 | Status: SHIPPED | OUTPATIENT
Start: 2023-03-07

## 2023-04-07 DIAGNOSIS — G47.00 INSOMNIA, UNSPECIFIED TYPE: ICD-10-CM

## 2023-04-07 RX ORDER — PEN NEEDLE, DIABETIC 32GX 5/32"
NEEDLE, DISPOSABLE MISCELLANEOUS
Qty: 100 EACH | Refills: 0 | Status: SHIPPED | OUTPATIENT
Start: 2023-04-07

## 2023-04-07 RX ORDER — ALPRAZOLAM 0.5 MG/1
TABLET ORAL
Qty: 30 TABLET | Refills: 0 | Status: SHIPPED | OUTPATIENT
Start: 2023-04-07

## 2023-04-17 ENCOUNTER — OFFICE VISIT (OUTPATIENT)
Dept: FAMILY MEDICINE CLINIC | Facility: CLINIC | Age: 48
End: 2023-04-17
Payer: COMMERCIAL

## 2023-04-17 VITALS
SYSTOLIC BLOOD PRESSURE: 126 MMHG | HEIGHT: 63 IN | BODY MASS INDEX: 34.55 KG/M2 | HEART RATE: 81 BPM | DIASTOLIC BLOOD PRESSURE: 82 MMHG | WEIGHT: 195 LBS | RESPIRATION RATE: 16 BRPM | OXYGEN SATURATION: 97 %

## 2023-04-17 DIAGNOSIS — Z00.00 ROUTINE GENERAL MEDICAL EXAMINATION AT A HEALTH CARE FACILITY: Primary | ICD-10-CM

## 2023-04-17 DIAGNOSIS — Z13.21 SCREENING FOR ENDOCRINE, NUTRITIONAL, METABOLIC AND IMMUNITY DISORDER: ICD-10-CM

## 2023-04-17 DIAGNOSIS — Z13.228 SCREENING FOR ENDOCRINE, NUTRITIONAL, METABOLIC AND IMMUNITY DISORDER: ICD-10-CM

## 2023-04-17 DIAGNOSIS — Z13.29 SCREENING FOR ENDOCRINE, NUTRITIONAL, METABOLIC AND IMMUNITY DISORDER: ICD-10-CM

## 2023-04-17 DIAGNOSIS — Z13.0 SCREENING FOR ENDOCRINE, NUTRITIONAL, METABOLIC AND IMMUNITY DISORDER: ICD-10-CM

## 2023-04-17 DIAGNOSIS — Z12.31 VISIT FOR SCREENING MAMMOGRAM: ICD-10-CM

## 2023-04-17 PROCEDURE — 99396 PREV VISIT EST AGE 40-64: CPT | Performed by: FAMILY MEDICINE

## 2023-04-17 PROCEDURE — 3074F SYST BP LT 130 MM HG: CPT | Performed by: FAMILY MEDICINE

## 2023-04-17 PROCEDURE — 3008F BODY MASS INDEX DOCD: CPT | Performed by: FAMILY MEDICINE

## 2023-04-17 PROCEDURE — 3079F DIAST BP 80-89 MM HG: CPT | Performed by: FAMILY MEDICINE

## 2023-04-19 ENCOUNTER — LAB ENCOUNTER (OUTPATIENT)
Dept: LAB | Age: 48
End: 2023-04-19
Attending: FAMILY MEDICINE
Payer: COMMERCIAL

## 2023-04-19 ENCOUNTER — HOSPITAL ENCOUNTER (OUTPATIENT)
Dept: MAMMOGRAPHY | Age: 48
Discharge: HOME | End: 2023-04-19
Attending: FAMILY MEDICINE
Payer: COMMERCIAL

## 2023-04-19 DIAGNOSIS — Z13.228 SCREENING FOR ENDOCRINE, NUTRITIONAL, METABOLIC AND IMMUNITY DISORDER: ICD-10-CM

## 2023-04-19 DIAGNOSIS — Z12.31 VISIT FOR SCREENING MAMMOGRAM: ICD-10-CM

## 2023-04-19 DIAGNOSIS — Z13.29 SCREENING FOR ENDOCRINE, NUTRITIONAL, METABOLIC AND IMMUNITY DISORDER: ICD-10-CM

## 2023-04-19 DIAGNOSIS — Z13.0 SCREENING FOR ENDOCRINE, NUTRITIONAL, METABOLIC AND IMMUNITY DISORDER: ICD-10-CM

## 2023-04-19 DIAGNOSIS — Z13.21 SCREENING FOR ENDOCRINE, NUTRITIONAL, METABOLIC AND IMMUNITY DISORDER: ICD-10-CM

## 2023-04-19 LAB
ALBUMIN SERPL-MCNC: 3.6 G/DL (ref 3.4–5)
ALBUMIN/GLOB SERPL: 1 {RATIO} (ref 1–2)
ALP LIVER SERPL-CCNC: 43 U/L
ALT SERPL-CCNC: 41 U/L
ANION GAP SERPL CALC-SCNC: 2 MMOL/L (ref 0–18)
AST SERPL-CCNC: 17 U/L (ref 15–37)
BASOPHILS # BLD AUTO: 0.05 X10(3) UL (ref 0–0.2)
BASOPHILS NFR BLD AUTO: 1 %
BILIRUB SERPL-MCNC: 0.4 MG/DL (ref 0.1–2)
BUN BLD-MCNC: 17 MG/DL (ref 7–18)
CALCIUM BLD-MCNC: 9.3 MG/DL (ref 8.5–10.1)
CHLORIDE SERPL-SCNC: 104 MMOL/L (ref 98–112)
CHOLEST SERPL-MCNC: 178 MG/DL (ref ?–200)
CO2 SERPL-SCNC: 27 MMOL/L (ref 21–32)
CREAT BLD-MCNC: 1.07 MG/DL
EOSINOPHIL # BLD AUTO: 0.09 X10(3) UL (ref 0–0.7)
EOSINOPHIL NFR BLD AUTO: 1.8 %
ERYTHROCYTE [DISTWIDTH] IN BLOOD BY AUTOMATED COUNT: 12.7 %
FASTING PATIENT LIPID ANSWER: YES
FASTING STATUS PATIENT QL REPORTED: YES
GFR SERPLBLD BASED ON 1.73 SQ M-ARVRAT: 64 ML/MIN/1.73M2 (ref 60–?)
GLOBULIN PLAS-MCNC: 3.6 G/DL (ref 2.8–4.4)
GLUCOSE BLD-MCNC: 98 MG/DL (ref 70–99)
HCT VFR BLD AUTO: 44.3 %
HDLC SERPL-MCNC: 58 MG/DL (ref 40–59)
HGB BLD-MCNC: 14.4 G/DL
IMM GRANULOCYTES # BLD AUTO: 0.01 X10(3) UL (ref 0–1)
IMM GRANULOCYTES NFR BLD: 0.2 %
LDLC SERPL CALC-MCNC: 89 MG/DL (ref ?–100)
LYMPHOCYTES # BLD AUTO: 1.7 X10(3) UL (ref 1–4)
LYMPHOCYTES NFR BLD AUTO: 34.7 %
MCH RBC QN AUTO: 29.8 PG (ref 26–34)
MCHC RBC AUTO-ENTMCNC: 32.5 G/DL (ref 31–37)
MCV RBC AUTO: 91.7 FL
MONOCYTES # BLD AUTO: 0.43 X10(3) UL (ref 0.1–1)
MONOCYTES NFR BLD AUTO: 8.8 %
NEUTROPHILS # BLD AUTO: 2.62 X10 (3) UL (ref 1.5–7.7)
NEUTROPHILS # BLD AUTO: 2.62 X10(3) UL (ref 1.5–7.7)
NEUTROPHILS NFR BLD AUTO: 53.5 %
NONHDLC SERPL-MCNC: 120 MG/DL (ref ?–130)
OSMOLALITY SERPL CALC.SUM OF ELEC: 278 MOSM/KG (ref 275–295)
PLATELET # BLD AUTO: 336 10(3)UL (ref 150–450)
POTASSIUM SERPL-SCNC: 4.3 MMOL/L (ref 3.5–5.1)
PROT SERPL-MCNC: 7.2 G/DL (ref 6.4–8.2)
RBC # BLD AUTO: 4.83 X10(6)UL
SODIUM SERPL-SCNC: 133 MMOL/L (ref 136–145)
TRIGL SERPL-MCNC: 183 MG/DL (ref 30–149)
TSI SER-ACNC: 0.75 MIU/ML (ref 0.36–3.74)
VIT D+METAB SERPL-MCNC: 89.1 NG/ML (ref 30–100)
VLDLC SERPL CALC-MCNC: 30 MG/DL (ref 0–30)
WBC # BLD AUTO: 4.9 X10(3) UL (ref 4–11)

## 2023-04-19 PROCEDURE — 82306 VITAMIN D 25 HYDROXY: CPT | Performed by: FAMILY MEDICINE

## 2023-04-19 PROCEDURE — 80050 GENERAL HEALTH PANEL: CPT | Performed by: FAMILY MEDICINE

## 2023-04-19 PROCEDURE — 80061 LIPID PANEL: CPT | Performed by: FAMILY MEDICINE

## 2023-04-19 PROCEDURE — 77067 SCR MAMMO BI INCL CAD: CPT | Performed by: FAMILY MEDICINE

## 2023-04-19 PROCEDURE — 77063 BREAST TOMOSYNTHESIS BI: CPT | Performed by: FAMILY MEDICINE

## 2023-04-21 ENCOUNTER — TELEPHONE (OUTPATIENT)
Dept: FAMILY MEDICINE CLINIC | Facility: CLINIC | Age: 48
End: 2023-04-21

## 2023-04-21 NOTE — TELEPHONE ENCOUNTER
Patient called back and would like to talk more in detail with [place] regarding her lab results

## 2023-04-21 NOTE — TELEPHONE ENCOUNTER
Maybe we should see Dr. Meliton Navas or Roshan Reyes  for second opinion, low sodium can produce many problems.

## 2023-04-21 NOTE — TELEPHONE ENCOUNTER
----- Message from Con Moreno MD sent at 4/21/2023  9:08 AM CDT -----  Sodium is little low, is pt drinking excessive amount of water? Thanks.

## 2023-04-23 RX ORDER — NALTREXONE HYDROCHLORIDE 50 MG/1
TABLET, FILM COATED ORAL
Qty: 45 TABLET | Refills: 0 | Status: SHIPPED | OUTPATIENT
Start: 2023-04-23

## 2023-04-24 RX ORDER — ESCITALOPRAM OXALATE 20 MG/1
TABLET ORAL
Qty: 30 TABLET | Refills: 11 | Status: SHIPPED | OUTPATIENT
Start: 2023-04-24

## 2023-05-04 ENCOUNTER — TELEPHONE (OUTPATIENT)
Dept: INTERNAL MEDICINE CLINIC | Facility: CLINIC | Age: 48
End: 2023-05-04

## 2023-05-04 ENCOUNTER — TELEMEDICINE (OUTPATIENT)
Dept: INTERNAL MEDICINE CLINIC | Facility: CLINIC | Age: 48
End: 2023-05-04
Payer: COMMERCIAL

## 2023-05-04 DIAGNOSIS — E78.2 MIXED HYPERLIPIDEMIA: ICD-10-CM

## 2023-05-04 DIAGNOSIS — E66.01 CLASS 2 SEVERE OBESITY WITH SERIOUS COMORBIDITY AND BODY MASS INDEX (BMI) OF 35.0 TO 35.9 IN ADULT, UNSPECIFIED OBESITY TYPE (HCC): ICD-10-CM

## 2023-05-04 DIAGNOSIS — Z51.81 ENCOUNTER FOR THERAPEUTIC DRUG MONITORING: Primary | ICD-10-CM

## 2023-05-04 DIAGNOSIS — R73.01 IFG (IMPAIRED FASTING GLUCOSE): ICD-10-CM

## 2023-05-04 PROCEDURE — 99213 OFFICE O/P EST LOW 20 MIN: CPT | Performed by: PHYSICIAN ASSISTANT

## 2023-05-04 NOTE — TELEPHONE ENCOUNTER
PA for [facility] CHINO GRULLON 1.7 completed through epic, awaiting decision.

## 2023-05-22 ENCOUNTER — OFFICE VISIT (OUTPATIENT)
Dept: NEPHROLOGY | Facility: CLINIC | Age: 48
End: 2023-05-22
Payer: COMMERCIAL

## 2023-05-22 ENCOUNTER — OFFICE VISIT (OUTPATIENT)
Facility: LOCATION | Age: 48
End: 2023-05-22
Payer: COMMERCIAL

## 2023-05-22 VITALS — TEMPERATURE: 99 F | HEART RATE: 84 BPM

## 2023-05-22 VITALS — BODY MASS INDEX: 34 KG/M2 | SYSTOLIC BLOOD PRESSURE: 120 MMHG | WEIGHT: 192 LBS | DIASTOLIC BLOOD PRESSURE: 72 MMHG

## 2023-05-22 DIAGNOSIS — E87.1 HYPONATREMIA: Primary | ICD-10-CM

## 2023-05-22 DIAGNOSIS — K42.9 UMBILICAL HERNIA WITHOUT OBSTRUCTION AND WITHOUT GANGRENE: Primary | ICD-10-CM

## 2023-06-05 NOTE — TELEPHONE ENCOUNTER
wegovy approved    Prior Authorization History  semaglutide-weight management 1.7 MG/0.75ML Subcutaneous Solution Auto-injector     Approval Details    Authorization number: [id number]   Authorized from May 4, 2023 to December 4, 2023   Information received electronically from payer     History    View all authorizations for this medication     Approved  5/5/2023 12:24 PM

## 2023-06-12 ENCOUNTER — PATIENT MESSAGE (OUTPATIENT)
Dept: INTERNAL MEDICINE CLINIC | Facility: CLINIC | Age: 48
End: 2023-06-12

## 2023-06-28 DIAGNOSIS — G47.00 INSOMNIA, UNSPECIFIED TYPE: ICD-10-CM

## 2023-06-28 RX ORDER — SEMAGLUTIDE 1.7 MG/.75ML
INJECTION, SOLUTION SUBCUTANEOUS
Qty: 9 ML | Refills: 0 | OUTPATIENT
Start: 2023-06-28

## 2023-06-30 RX ORDER — ALPRAZOLAM 0.5 MG/1
0.5 TABLET ORAL NIGHTLY PRN
Qty: 30 TABLET | Refills: 1 | Status: SHIPPED | OUTPATIENT
Start: 2023-06-30

## 2023-07-12 RX ORDER — SEMAGLUTIDE 1.7 MG/.75ML
1.5 INJECTION, SOLUTION SUBCUTANEOUS WEEKLY
Qty: 9 ML | Refills: 0 | Status: SHIPPED | OUTPATIENT
Start: 2023-07-12 | End: 2023-07-17 | Stop reason: DRUGHIGH

## 2023-07-17 ENCOUNTER — TELEPHONE (OUTPATIENT)
Dept: INTERNAL MEDICINE CLINIC | Facility: CLINIC | Age: 48
End: 2023-07-17

## 2023-07-17 NOTE — TELEPHONE ENCOUNTER
[id number]       Pt left VM that [facility] is not delivering her meds, I called her back, she said [facility] wants to speak to a nurse before they process the order. I called [facility], since we sent in 1.7mg dose but the instructions were wrong on it, as well as pt also wanted to move up to 2.4mg instead 1.7mg dose. That's the reason they could not dispense the 1.7mg dose. I rectify that, and resent 2.4mg dose for 90 days, they going to deliver in 7 working days, I called pt again, notified. Encounter closed.

## 2023-07-28 ENCOUNTER — TELEPHONE (OUTPATIENT)
Dept: FAMILY MEDICINE CLINIC | Facility: CLINIC | Age: 48
End: 2023-07-28

## 2023-07-28 RX ORDER — ESCITALOPRAM OXALATE 20 MG/1
20 TABLET ORAL DAILY
Qty: 7 TABLET | Refills: 0 | Status: SHIPPED | OUTPATIENT
Start: 2023-07-28 | End: 2023-08-04

## 2023-07-28 NOTE — TELEPHONE ENCOUNTER
Ronaldo Gee requesting Medication Refill for:    Patient is travelling and forgot her medication and asking if we can give her 7 days worth of medication to be sent to the pharmacy listed below. Please Advise     Medication Quantity Refills Start End   escitalopram (LEXAPRO) 20 MG Oral Tab (Discontinued) 90 tablet 3 2/22/2021 12/15/2021   Sig:   Take 1 tablet (20 mg total) by mouth daily.      Route:   Oral     Order #:   [id number]         LOV: 4/17/2023   Last Refill date: 02/22/2021  Pharmacy:        [id number] [place] [id number] [facility] [address], [phone], [phone]

## 2023-08-29 ENCOUNTER — TELEMEDICINE (OUTPATIENT)
Dept: INTERNAL MEDICINE CLINIC | Facility: CLINIC | Age: 48
End: 2023-08-29
Payer: COMMERCIAL

## 2023-08-29 DIAGNOSIS — R73.01 IFG (IMPAIRED FASTING GLUCOSE): ICD-10-CM

## 2023-08-29 DIAGNOSIS — Z51.81 ENCOUNTER FOR THERAPEUTIC DRUG MONITORING: Primary | ICD-10-CM

## 2023-08-29 DIAGNOSIS — E78.2 MIXED HYPERLIPIDEMIA: ICD-10-CM

## 2023-08-29 DIAGNOSIS — E66.01 CLASS 2 SEVERE OBESITY WITH SERIOUS COMORBIDITY AND BODY MASS INDEX (BMI) OF 35.0 TO 35.9 IN ADULT, UNSPECIFIED OBESITY TYPE: ICD-10-CM

## 2023-08-29 PROCEDURE — 99213 OFFICE O/P EST LOW 20 MIN: CPT | Performed by: PHYSICIAN ASSISTANT

## 2023-09-06 RX ORDER — MAGNESIUM 30 MG
30 TABLET ORAL 2 TIMES DAILY
COMMUNITY

## 2023-09-06 RX ORDER — THYROID, PORCINE 60 MG/1
65 TABLET ORAL DAILY
COMMUNITY

## 2023-09-06 RX ORDER — GARLIC EXTRACT 500 MG
1 CAPSULE ORAL DAILY
COMMUNITY

## 2023-09-12 ENCOUNTER — TELEPHONE (OUTPATIENT)
Facility: LOCATION | Age: 48
End: 2023-09-12

## 2023-09-25 ENCOUNTER — ANESTHESIA EVENT (OUTPATIENT)
Dept: SURGERY | Facility: HOSPITAL | Age: 48
End: 2023-09-25
Payer: COMMERCIAL

## 2023-09-25 ENCOUNTER — ANESTHESIA (OUTPATIENT)
Dept: SURGERY | Facility: HOSPITAL | Age: 48
End: 2023-09-25
Payer: COMMERCIAL

## 2023-09-25 ENCOUNTER — HOSPITAL ENCOUNTER (OUTPATIENT)
Facility: HOSPITAL | Age: 48
Setting detail: HOSPITAL OUTPATIENT SURGERY
Discharge: HOME | End: 2023-09-25
Attending: SURGERY | Admitting: SURGERY
Payer: COMMERCIAL

## 2023-09-25 VITALS
TEMPERATURE: 97 F | RESPIRATION RATE: 18 BRPM | HEIGHT: 63 IN | BODY MASS INDEX: 32.85 KG/M2 | SYSTOLIC BLOOD PRESSURE: 149 MMHG | OXYGEN SATURATION: 94 % | HEART RATE: 60 BPM | DIASTOLIC BLOOD PRESSURE: 91 MMHG | WEIGHT: 185.38 LBS

## 2023-09-25 LAB — B-HCG UR QL: NEGATIVE

## 2023-09-25 PROCEDURE — 0WQF0ZZ REPAIR ABDOMINAL WALL, OPEN APPROACH: ICD-10-PCS | Performed by: SURGERY

## 2023-09-25 PROCEDURE — 81025 URINE PREGNANCY TEST: CPT

## 2023-09-25 RX ORDER — SODIUM CHLORIDE, SODIUM LACTATE, POTASSIUM CHLORIDE, CALCIUM CHLORIDE 600; 310; 30; 20 MG/100ML; MG/100ML; MG/100ML; MG/100ML
INJECTION, SOLUTION INTRAVENOUS CONTINUOUS
Status: DISCONTINUED | OUTPATIENT
Start: 2023-09-25 | End: 2023-09-25

## 2023-09-25 RX ORDER — ACETAMINOPHEN 500 MG
1000 TABLET ORAL ONCE AS NEEDED
Status: COMPLETED | OUTPATIENT
Start: 2023-09-25 | End: 2023-09-25

## 2023-09-25 RX ORDER — HYDROCODONE BITARTRATE AND ACETAMINOPHEN 5; 325 MG/1; MG/1
1 TABLET ORAL EVERY 6 HOURS PRN
Qty: 10 TABLET | Refills: 0 | Status: SHIPPED | OUTPATIENT
Start: 2023-09-25

## 2023-09-25 RX ORDER — MIDAZOLAM HYDROCHLORIDE 1 MG/ML
1 INJECTION INTRAMUSCULAR; INTRAVENOUS EVERY 5 MIN PRN
Status: DISCONTINUED | OUTPATIENT
Start: 2023-09-25 | End: 2023-09-25

## 2023-09-25 RX ORDER — NALOXONE HYDROCHLORIDE 0.4 MG/ML
0.08 INJECTION, SOLUTION INTRAMUSCULAR; INTRAVENOUS; SUBCUTANEOUS AS NEEDED
Status: DISCONTINUED | OUTPATIENT
Start: 2023-09-25 | End: 2023-09-25

## 2023-09-25 RX ORDER — PROGESTERONE 200 MG/1
CAPSULE ORAL
COMMUNITY
Start: 2023-07-08

## 2023-09-25 RX ORDER — DIPHENHYDRAMINE HYDROCHLORIDE 50 MG/ML
12.5 INJECTION INTRAMUSCULAR; INTRAVENOUS AS NEEDED
Status: DISCONTINUED | OUTPATIENT
Start: 2023-09-25 | End: 2023-09-25

## 2023-09-25 RX ORDER — LIDOCAINE HYDROCHLORIDE 10 MG/ML
INJECTION, SOLUTION EPIDURAL; INFILTRATION; INTRACAUDAL; PERINEURAL AS NEEDED
Status: DISCONTINUED | OUTPATIENT
Start: 2023-09-25 | End: 2023-09-25 | Stop reason: SURG

## 2023-09-25 RX ORDER — KETOROLAC TROMETHAMINE 30 MG/ML
INJECTION, SOLUTION INTRAMUSCULAR; INTRAVENOUS AS NEEDED
Status: DISCONTINUED | OUTPATIENT
Start: 2023-09-25 | End: 2023-09-25 | Stop reason: SURG

## 2023-09-25 RX ORDER — BUPIVACAINE HYDROCHLORIDE AND EPINEPHRINE 5; 5 MG/ML; UG/ML
INJECTION, SOLUTION EPIDURAL; INTRACAUDAL; PERINEURAL AS NEEDED
Status: DISCONTINUED | OUTPATIENT
Start: 2023-09-25 | End: 2023-09-25 | Stop reason: HOSPADM

## 2023-09-25 RX ORDER — ACETAMINOPHEN 500 MG
1000 TABLET ORAL ONCE
Status: DISCONTINUED | OUTPATIENT
Start: 2023-09-25 | End: 2023-09-25 | Stop reason: HOSPADM

## 2023-09-25 RX ORDER — SCOLOPAMINE TRANSDERMAL SYSTEM 1 MG/1
1 PATCH, EXTENDED RELEASE TRANSDERMAL ONCE
Status: DISCONTINUED | OUTPATIENT
Start: 2023-09-25 | End: 2023-09-25

## 2023-09-25 RX ORDER — HYDROMORPHONE HYDROCHLORIDE 1 MG/ML
0.6 INJECTION, SOLUTION INTRAMUSCULAR; INTRAVENOUS; SUBCUTANEOUS EVERY 5 MIN PRN
Status: DISCONTINUED | OUTPATIENT
Start: 2023-09-25 | End: 2023-09-25

## 2023-09-25 RX ORDER — HEPARIN SODIUM 5000 [USP'U]/ML
5000 INJECTION, SOLUTION INTRAVENOUS; SUBCUTANEOUS ONCE
Status: COMPLETED | OUTPATIENT
Start: 2023-09-25 | End: 2023-09-25

## 2023-09-25 RX ORDER — ONDANSETRON 2 MG/ML
INJECTION INTRAMUSCULAR; INTRAVENOUS AS NEEDED
Status: DISCONTINUED | OUTPATIENT
Start: 2023-09-25 | End: 2023-09-25 | Stop reason: SURG

## 2023-09-25 RX ORDER — HYDROCODONE BITARTRATE AND ACETAMINOPHEN 5; 325 MG/1; MG/1
2 TABLET ORAL ONCE AS NEEDED
Status: COMPLETED | OUTPATIENT
Start: 2023-09-25 | End: 2023-09-25

## 2023-09-25 RX ORDER — HYDROMORPHONE HYDROCHLORIDE 1 MG/ML
0.4 INJECTION, SOLUTION INTRAMUSCULAR; INTRAVENOUS; SUBCUTANEOUS EVERY 5 MIN PRN
Status: DISCONTINUED | OUTPATIENT
Start: 2023-09-25 | End: 2023-09-25

## 2023-09-25 RX ORDER — PROCHLORPERAZINE EDISYLATE 5 MG/ML
5 INJECTION INTRAMUSCULAR; INTRAVENOUS EVERY 8 HOURS PRN
Status: DISCONTINUED | OUTPATIENT
Start: 2023-09-25 | End: 2023-09-25

## 2023-09-25 RX ORDER — MEPERIDINE HYDROCHLORIDE 25 MG/ML
12.5 INJECTION INTRAMUSCULAR; INTRAVENOUS; SUBCUTANEOUS AS NEEDED
Status: DISCONTINUED | OUTPATIENT
Start: 2023-09-25 | End: 2023-09-25

## 2023-09-25 RX ORDER — HYDROMORPHONE HYDROCHLORIDE 1 MG/ML
0.2 INJECTION, SOLUTION INTRAMUSCULAR; INTRAVENOUS; SUBCUTANEOUS EVERY 5 MIN PRN
Status: DISCONTINUED | OUTPATIENT
Start: 2023-09-25 | End: 2023-09-25

## 2023-09-25 RX ORDER — HYDROCODONE BITARTRATE AND ACETAMINOPHEN 5; 325 MG/1; MG/1
1 TABLET ORAL ONCE AS NEEDED
Status: COMPLETED | OUTPATIENT
Start: 2023-09-25 | End: 2023-09-25

## 2023-09-25 RX ORDER — DEXAMETHASONE SODIUM PHOSPHATE 4 MG/ML
VIAL (ML) INJECTION AS NEEDED
Status: DISCONTINUED | OUTPATIENT
Start: 2023-09-25 | End: 2023-09-25 | Stop reason: SURG

## 2023-09-25 RX ORDER — MIDAZOLAM HYDROCHLORIDE 1 MG/ML
INJECTION INTRAMUSCULAR; INTRAVENOUS AS NEEDED
Status: DISCONTINUED | OUTPATIENT
Start: 2023-09-25 | End: 2023-09-25 | Stop reason: SURG

## 2023-09-25 RX ORDER — CLINDAMYCIN PHOSPHATE 900 MG/50ML
900 INJECTION INTRAVENOUS ONCE
Status: COMPLETED | OUTPATIENT
Start: 2023-09-25 | End: 2023-09-25

## 2023-09-25 RX ORDER — ONDANSETRON 2 MG/ML
4 INJECTION INTRAMUSCULAR; INTRAVENOUS EVERY 6 HOURS PRN
Status: DISCONTINUED | OUTPATIENT
Start: 2023-09-25 | End: 2023-09-25

## 2023-09-25 RX ADMIN — ONDANSETRON 4 MG: 2 INJECTION INTRAMUSCULAR; INTRAVENOUS at 08:11:00

## 2023-09-25 RX ADMIN — DEXAMETHASONE SODIUM PHOSPHATE 8 MG: 4 MG/ML VIAL (ML) INJECTION at 08:00:00

## 2023-09-25 RX ADMIN — LIDOCAINE HYDROCHLORIDE 50 MG: 10 INJECTION, SOLUTION EPIDURAL; INFILTRATION; INTRACAUDAL; PERINEURAL at 07:38:00

## 2023-09-25 RX ADMIN — KETOROLAC TROMETHAMINE 30 MG: 30 INJECTION, SOLUTION INTRAMUSCULAR; INTRAVENOUS at 08:11:00

## 2023-09-25 RX ADMIN — CLINDAMYCIN PHOSPHATE 900 MG: 900 INJECTION INTRAVENOUS at 07:42:00

## 2023-09-25 RX ADMIN — SODIUM CHLORIDE, SODIUM LACTATE, POTASSIUM CHLORIDE, CALCIUM CHLORIDE: 600; 310; 30; 20 INJECTION, SOLUTION INTRAVENOUS at 07:33:00

## 2023-09-25 RX ADMIN — SODIUM CHLORIDE, SODIUM LACTATE, POTASSIUM CHLORIDE, CALCIUM CHLORIDE: 600; 310; 30; 20 INJECTION, SOLUTION INTRAVENOUS at 08:24:00

## 2023-09-25 RX ADMIN — MIDAZOLAM HYDROCHLORIDE 2 MG: 1 INJECTION INTRAMUSCULAR; INTRAVENOUS at 07:33:00

## 2023-09-25 NOTE — H&P
History & Physical Examination    Patient Name: Sarah Alvarez  MRN: [id number]  CSN: [id number]  YOB: 1975    Diagnosis: Umbilical hernia    Present Illness: The patient is a 40-year-old female seen at the request of her primary care physician regarding an umbilical hernia. I initially met the patient in April 2022 for colonoscopy. On her physical exam, I noted that she had an umbilical hernia. We discussed repair at that time, but the patient declined. The patient since then, she has had \"stomach issues. \"  She feels increased gurgling in her abdomen. She feels that the umbilical hernia may be contributing to her symptoms. progesterone 200 MG Oral Cap, , Disp: , Rfl:   Thyroid 65 MG Oral Tab, Take 1 tablet (65 mg total) by mouth daily. , Disp: , Rfl: , 9/24/2023 at 2100  cholecalciferol (VITAMIN D3) 125 MCG (5000 UT) Oral Cap, Take 1 capsule (5,000 Units total) by mouth daily. , Disp: , Rfl: , 9/22/2023  Multiple Vitamin (MULTI-VITAMIN) Oral Tab, Take 1 tablet by mouth daily. , Disp: , Rfl: , 9/22/2023  magnesium 30 MG Oral Tab, Take 1 tablet (30 mg total) by mouth 2 (two) times daily. Unsure of dose, Disp: , Rfl: , 9/22/2023  Ferrous Sulfate (IRON OR), Take by mouth., Disp: , Rfl: , 9/22/2023  Omega-3 Fatty Acids (FISH OIL OR), Take by mouth., Disp: , Rfl: , 9/11/2023  semaglutide-weight management 2.4 MG/0.75ML Subcutaneous Solution Auto-injector, Inject 0.75 mL (2.4 mg total) into the skin once a week for 12 doses. , Disp: 9 mL, Rfl: 0, 9/17/2023  ALPRAZolam 0.5 MG Oral Tab, Take 1 tablet (0.5 mg total) by mouth nightly as needed. , Disp: 30 tablet, Rfl: 1, Past Month  ESCITALOPRAM 20 MG Oral Tab, TAKE 1 TABLET BY MOUTH  DAILY, Disp: 30 tablet, Rfl: 11, 9/24/2023  levothyroxine 75 MCG Oral Tab, Take 1 tablet (75 mcg total) by mouth daily. , Disp: , Rfl: , 9/24/2023 at 2100  acidophilus-pectin Oral Cap, Take 1 capsule by mouth daily. , Disp: , Rfl: , More than a month  P[place]MA [place], Disp: , Rfl: , Unknown      acetaminophen (Tylenol Extra Strength) tab 1,000 mg, 1,000 mg, Oral, Once  scopolamine (Transderm-Scop) 1 MG/3DAYS patch 1 patch, 1 patch, Transdermal, Once  lactated ringers infusion, , Intravenous, Continuous  clindamycin phosphate in dextrose 5% (Cleocin) 900 mg/50mL IVPB premix 900 mg, 900 mg, Intravenous, Once        Allergies:   Pcn [Penicillins]       HIVES  Keflex                  RASH, ITCHING    Past Medical History:   Diagnosis Date    ALLERGIC RHINITIS     Anxiety     Celiac disease     or IBS    DEPRESSION     Depression     as a teenager/no synptoms now    Disorder of thyroid     Dysmenorrhea     Dyspareunia     HEADACHES     HYPERTHYROIDISM     S/P IODINE    HYPOTHYROIDISM     2ND TO RADIATION    IBS (irritable bowel syndrome)     Pap smear for cervical cancer screening 07/20/2010    wnl, neg hpv    Sleep apnea     dx'ed but had no follow up. Thyroid disease     levoxyl/thyroid    Visual impairment      Past Surgical History:   Procedure Laterality Date    HERNIA SURGERY      at age 11    LENA BIOPSY STEREO NODULE 1 SITE LEFT (CPT=19081) Left 10/2021    benign    TUBAL LIGATION Bilateral 04/08/2010    via essure technique. under mac      Family History   Problem Relation Age of Onset    Hypertension Father     Other (Other) Father     Other (Cancer lungs) Father         lung, at 68    Diabetes Mother     Cancer Maternal Grandmother         lymphoma    Heart Disorder Paternal Grandfather     Heart Disorder Paternal Uncle     Lipids Maternal Aunt     Diabetes Maternal Aunt      Social History    Tobacco Use      Smoking status: Former        Packs/day: 0        Types: Cigarettes      Smokeless tobacco: Never    Alcohol use:  Yes      Alcohol/week: 0.0 standard drinks of alcohol      Comment: social / weekly      SYSTEM Check if Review is Normal Check if Physical Exam is Normal If not normal, please explain:   HEENT [x ] [ x]    NECK & BACK [x ] [x ]    HEART [x ] [x ] LUNGS [x ] [x ]    ABDOMEN [ ] [ ] hernia   UROGENITAL [ x] [x ]    EXTREMITIES [ x] [x ]    OTHER        [ x ] I have discussed the risks and benefits and alternatives with the patient/family. They understand and agree to proceed with plan of care. [ x ] I have reviewed the History and Physical done within the last 30 days. Any changes noted above.     Sameer Jacobsen MD  9/25/2023  7:25 AM

## 2023-09-25 NOTE — OPERATIVE REPORT
[facility]  Op Note    Graylon Even Location: OR   CSN [id number] MRN [id number]   Admission Date 9/25/2023 Operation Date 9/25/2023   Attending Physician Linda Costello MD Operating Physician Duy Jackson MD     DATE OF OPERATION:  9/25/2023  PREOPERATIVE DIAGNOSIS: Incarcerated umbilical hernia. POSTOPERATIVE DIAGNOSIS: Incarcerated umbilical hernia. PROCEDURE PERFORMED: Repair of incarcerated umbilical hernia (no mesh used)  SURGEON:  Duy Jackson MD  ANESTHESIA: General.    SPECIMEN: None. BLOOD LOSS: 5 mL. COMPLICATIONS: None. INDICATIONS FOR PROCEDURE: The patient is a 42-year-old female with a known umbilical hernia. The patient has had increasing discomfort in her abdomen. Physical exam confirmed the presence of an umbilical hernia. The patient was offered umbilical hernia repair. The risks, benefits, and alternatives of this were discussed in detail with the patient. Risks included, but were not limited to, recurrence of the hernia, bleeding, wound infection, and injury to the hernia contents. The patient was agreeable to proceed with the operation. OPERATIVE TECHNIQUE: After informed consent was obtained, patient was taken to the operating room and placed in supine position. General anesthesia was induced. The abdomen was then prepped and draped in the usual sterile fashion. A curvilinear incision was made inferior to the umbilicus with a 15 blade scalpel. Cautery was used to obtain hemostasis and continue dissection down to the fascial plane. At this point, the umbilical stalk was encircled with a hemostat. It was raised off the underlying fascia using cautery. This revealed the hernia defect, which measured approximately 7 mm. The incarcerated hernia contents were grasped. Their attachments were lysed using cautery. The incarcerated contents were then placed back into the abdomen. The fascia was reapproximated using interrupted 0 Ethibond suture. The umbilical stalk was then tacked down to the underlying fascia using 3-0 Vicryl suture. The skin was reapproximated with a subcuticular 4-0 Vicryl suture. Marcaine 0.5% with epinephrine was injected into the incision to help with postoperative pain control. Steri-Strips and Mastisol were placed across the incisions as well as sterile dressings. The patient tolerated the procedure well, was extubated in the OR, and went to the PACU in good condition.     Kodak Bell MD

## 2023-09-25 NOTE — ANESTHESIA PROCEDURE NOTES
Airway  Date/Time: 9/25/2023 7:40 AM  Urgency: elective    Airway not difficult    General Information and Staff    Patient location during procedure: OR  Anesthesiologist: Guillermina Corley MD  Resident/CRNA: Carly Burger CRNA  Performed: CRNA   Performed by: Carly Burger CRNA  Authorized by: Guillermina Corley MD      Indications and Patient Condition  Indications for airway management: anesthesia  Sedation level: deep  Preoxygenated: yes  Patient position: sniffing  Mask difficulty assessment: 1 - vent by mask    Final Airway Details  Final airway type: supraglottic airway      Successful airway: classic  Size 3       Number of attempts at approach: 1

## 2023-09-26 ENCOUNTER — TELEPHONE (OUTPATIENT)
Dept: FAMILY MEDICINE CLINIC | Facility: CLINIC | Age: 48
End: 2023-09-26

## 2023-09-26 NOTE — TELEPHONE ENCOUNTER
Problems   The patient or proxy has not reviewed this information. Medications   The patient or proxy has not reviewed this information, and there are updates pending:   Requested Medication Additions Start Date Reported By  Comments   Wegovy 1.7 MG/0.75ML Soaj 5/14/2023 Lulamarilyn Ye    NP Thyroid 15 MG Tabs 7/31/2023 Lulamarilyn Ye    NP Thyroid 60 MG Tabs 7/31/2023 Lula Ye      Requested Medication Removals Start Date Reported By  Comments   Saxenda 18 MG/3ML Sopn 3/7/2023 Lula Ye    BD Pen Needle Kae 2nd Gen 32G X 4 MM Misc 4/7/2023 Kelley Antonio Travis    naltrexone 50 MG Tabs 4/23/2023 Kelley Nogueraora    naltrexone 50 MG Tabs 11/3/2022 Lulamarilyn Ye    Drospirenone-Ethinyl Estradiol 3-0.02 MG Tabs 1/5/2023 Kelley Nogueraora    thyroid 80 MG Tabs  Kelley Nogueraora    NON FORMULARY  Lula Ye      Allergies   The patient or proxy has not reviewed this information.

## 2023-09-29 RX ORDER — SEMAGLUTIDE 2.4 MG/.75ML
1.5 INJECTION, SOLUTION SUBCUTANEOUS WEEKLY
Qty: 3 ML | Refills: 0 | Status: SHIPPED | OUTPATIENT
Start: 2023-09-29

## 2023-09-29 NOTE — TELEPHONE ENCOUNTER
Requesting wegovy 2.4 mg  LOV: 8/29/23  RTC: not noted  Last Relevant Labs: na  Filled: 7/17/23 #9ml with 0 refills    Future Appointments   Date Time Provider Nadege Bowen   10/6/2023  9:00 AM [facility]   12/20/2023 10:20 AM Renea York PA-C [address] [facility] [address]     This is for mail order so they request early

## 2023-10-02 ENCOUNTER — TELEPHONE (OUTPATIENT)
Facility: LOCATION | Age: 48
End: 2023-10-02

## 2023-10-02 NOTE — TELEPHONE ENCOUNTER
Spoke to patient. Moved appointment to tomorrow 10/3/2023 with a PA for evaluation. C/o incision open with pink drainage. Denies fever or redness. Advised patient to call back if change or worsening symptoms. Patient voiced understanding.      Future Appointments   Date Time Provider Nadege Bowen   [facility]   12/20/2023 10:20 AM Hamlet Davenport PA-C [address] [facility] [address]

## 2023-10-02 NOTE — TELEPHONE ENCOUNTER
Good morning,    Gioave a patient on the phone that states she recently had surgery with  and her steri strips has came off and her wound reopened. She tried coming her but we were close so she went to urgent care and they reapplied the stripes but they are back off and she would like to come in before her post-op appointment that's this Friday.     Call back # [phone]

## 2023-10-03 ENCOUNTER — OFFICE VISIT (OUTPATIENT)
Facility: LOCATION | Age: 48
End: 2023-10-03
Payer: COMMERCIAL

## 2023-10-03 VITALS — TEMPERATURE: 97 F | HEART RATE: 77 BPM

## 2023-10-03 DIAGNOSIS — Z87.19 STATUS POST HERNIA REPAIR: Primary | ICD-10-CM

## 2023-10-03 DIAGNOSIS — Z98.890 STATUS POST HERNIA REPAIR: Primary | ICD-10-CM

## 2023-10-03 DIAGNOSIS — Z51.89 VISIT FOR WOUND CHECK: ICD-10-CM

## 2023-10-03 PROCEDURE — 99212 OFFICE O/P EST SF 10 MIN: CPT

## 2023-10-04 ENCOUNTER — TELEPHONE (OUTPATIENT)
Dept: INTERNAL MEDICINE CLINIC | Facility: CLINIC | Age: 48
End: 2023-10-04

## 2023-10-04 RX ORDER — SEMAGLUTIDE 2.4 MG/.75ML
2.4 INJECTION, SOLUTION SUBCUTANEOUS WEEKLY
Qty: 3 ML | Refills: 0 | Status: SHIPPED | OUTPATIENT
Start: 2023-10-04

## 2023-10-04 NOTE — TELEPHONE ENCOUNTER
[facility] sent fax to clarify directions sent on Wegovy 2.4 mg  New order sent with proper directions of giving 2.4 mg weekly    Medication Detail    Medication Quantity Refills Start End   semaglutide-weight management (WEGOVY) 2.4 MG/0.75ML Subcutaneous Solution Auto-injector 3 mL 0 9/29/2023    Sig:   Inject 0.47 mL (1.504 mg total) into the skin once a week.      Route:   Subcutaneous     Order #:   [id number]

## 2023-10-16 ENCOUNTER — OFFICE VISIT (OUTPATIENT)
Facility: LOCATION | Age: 48
End: 2023-10-16

## 2023-10-16 VITALS — TEMPERATURE: 99 F | HEART RATE: 69 BPM

## 2023-10-16 DIAGNOSIS — Z98.890 POSTOPERATIVE STATE: Primary | ICD-10-CM

## 2023-10-16 PROCEDURE — 99024 POSTOP FOLLOW-UP VISIT: CPT

## 2023-11-02 ENCOUNTER — OFFICE VISIT (OUTPATIENT)
Facility: LOCATION | Age: 48
End: 2023-11-02

## 2023-11-02 VITALS — TEMPERATURE: 98 F | HEART RATE: 93 BPM

## 2023-11-02 DIAGNOSIS — Z98.890 POSTOPERATIVE STATE: Primary | ICD-10-CM

## 2023-11-02 PROCEDURE — 99024 POSTOP FOLLOW-UP VISIT: CPT

## 2023-12-20 ENCOUNTER — OFFICE VISIT (OUTPATIENT)
Dept: INTERNAL MEDICINE CLINIC | Facility: CLINIC | Age: 48
End: 2023-12-20
Payer: COMMERCIAL

## 2023-12-20 VITALS
BODY MASS INDEX: 33.31 KG/M2 | HEIGHT: 63 IN | HEART RATE: 78 BPM | DIASTOLIC BLOOD PRESSURE: 80 MMHG | WEIGHT: 188 LBS | SYSTOLIC BLOOD PRESSURE: 118 MMHG | OXYGEN SATURATION: 95 %

## 2023-12-20 DIAGNOSIS — R73.01 IFG (IMPAIRED FASTING GLUCOSE): ICD-10-CM

## 2023-12-20 DIAGNOSIS — E78.2 MIXED HYPERLIPIDEMIA: ICD-10-CM

## 2023-12-20 DIAGNOSIS — Z51.81 ENCOUNTER FOR THERAPEUTIC DRUG MONITORING: Primary | ICD-10-CM

## 2023-12-20 DIAGNOSIS — Z51.81 ENCOUNTER FOR THERAPEUTIC DRUG MONITORING: ICD-10-CM

## 2023-12-20 DIAGNOSIS — F41.9 ANXIETY: ICD-10-CM

## 2023-12-20 DIAGNOSIS — G47.00 INSOMNIA, UNSPECIFIED TYPE: ICD-10-CM

## 2023-12-20 DIAGNOSIS — E66.9 CLASS 1 OBESITY WITH SERIOUS COMORBIDITY AND BODY MASS INDEX (BMI) OF 33.0 TO 33.9 IN ADULT, UNSPECIFIED OBESITY TYPE: ICD-10-CM

## 2023-12-20 PROCEDURE — 3008F BODY MASS INDEX DOCD: CPT | Performed by: PHYSICIAN ASSISTANT

## 2023-12-20 PROCEDURE — 3079F DIAST BP 80-89 MM HG: CPT | Performed by: PHYSICIAN ASSISTANT

## 2023-12-20 PROCEDURE — 3074F SYST BP LT 130 MM HG: CPT | Performed by: PHYSICIAN ASSISTANT

## 2023-12-20 PROCEDURE — 99214 OFFICE O/P EST MOD 30 MIN: CPT | Performed by: PHYSICIAN ASSISTANT

## 2023-12-20 RX ORDER — SEMAGLUTIDE 2.4 MG/.75ML
2.4 INJECTION, SOLUTION SUBCUTANEOUS WEEKLY
Qty: 9 ML | Refills: 1 | Status: SHIPPED | OUTPATIENT
Start: 2023-12-20

## 2023-12-21 RX ORDER — ALPRAZOLAM 0.5 MG/1
0.5 TABLET ORAL NIGHTLY PRN
Qty: 30 TABLET | Refills: 0 | Status: SHIPPED | OUTPATIENT
Start: 2023-12-21

## 2023-12-21 RX ORDER — SEMAGLUTIDE 2.4 MG/.75ML
1.5 INJECTION, SOLUTION SUBCUTANEOUS WEEKLY
Qty: 3 ML | Refills: 0 | OUTPATIENT
Start: 2023-12-21

## 2024-01-02 DIAGNOSIS — E78.2 MIXED HYPERLIPIDEMIA: ICD-10-CM

## 2024-01-02 DIAGNOSIS — Z51.81 ENCOUNTER FOR THERAPEUTIC DRUG MONITORING: ICD-10-CM

## 2024-01-02 DIAGNOSIS — R73.01 IFG (IMPAIRED FASTING GLUCOSE): ICD-10-CM

## 2024-01-02 DIAGNOSIS — E66.9 CLASS 1 OBESITY WITH SERIOUS COMORBIDITY AND BODY MASS INDEX (BMI) OF 33.0 TO 33.9 IN ADULT, UNSPECIFIED OBESITY TYPE: ICD-10-CM

## 2024-01-03 RX ORDER — SEMAGLUTIDE 2.4 MG/.75ML
1.5 INJECTION, SOLUTION SUBCUTANEOUS WEEKLY
Qty: 3 ML | Refills: 0 | OUTPATIENT
Start: 2024-01-03

## 2024-01-15 RX ORDER — ESCITALOPRAM OXALATE 20 MG/1
20 TABLET ORAL DAILY
Qty: 7 TABLET | Refills: 0 | Status: SHIPPED | OUTPATIENT
Start: 2024-01-15

## 2024-01-15 NOTE — TELEPHONE ENCOUNTER
Patient is out of town and forgot her meds at home and needs the Lexapro ASAP.    Pharm:  [facility], [address] in [place] (entered into chart)

## 2024-01-17 ENCOUNTER — PATIENT MESSAGE (OUTPATIENT)
Dept: INTERNAL MEDICINE CLINIC | Facility: CLINIC | Age: 49
End: 2024-01-17

## 2024-01-17 DIAGNOSIS — E66.9 CLASS 1 OBESITY WITH SERIOUS COMORBIDITY AND BODY MASS INDEX (BMI) OF 33.0 TO 33.9 IN ADULT, UNSPECIFIED OBESITY TYPE: ICD-10-CM

## 2024-01-17 DIAGNOSIS — Z51.81 ENCOUNTER FOR THERAPEUTIC DRUG MONITORING: ICD-10-CM

## 2024-01-17 DIAGNOSIS — R73.01 IFG (IMPAIRED FASTING GLUCOSE): ICD-10-CM

## 2024-01-17 DIAGNOSIS — E78.2 MIXED HYPERLIPIDEMIA: ICD-10-CM

## 2024-01-17 NOTE — TELEPHONE ENCOUNTER
From: Yasmine Robles  To: Yasmine Christian  Sent: 1/17/2024 12:50 PM CST  Subject: Wegovy 2.4 Renewal Status    I am just looking for a status update on the Wegovy 2.4ml renewal prescription...I am completely out of medication at this time.    Thank you,  Yasmine Robles

## 2024-01-18 ENCOUNTER — TELEPHONE (OUTPATIENT)
Dept: INTERNAL MEDICINE CLINIC | Facility: CLINIC | Age: 49
End: 2024-01-18

## 2024-01-18 RX ORDER — SEMAGLUTIDE 2.4 MG/.75ML
2.4 INJECTION, SOLUTION SUBCUTANEOUS WEEKLY
Qty: 9 ML | Refills: 1 | Status: SHIPPED | OUTPATIENT
Start: 2024-01-18

## 2024-01-18 NOTE — TELEPHONE ENCOUNTER
PA for wegovy 2.4 mg entered in CMM today  Awaiting decision    Yasmine Robles (Jones: [id number])  Wegovy 2.4MG/0.75ML auto-injectors  Form  [facility] Electronic Prior Authorization Form (2017 NCPDP)

## 2024-01-18 NOTE — TELEPHONE ENCOUNTER
Approved today in [facility]    Message from plan: Request Reference Number: [id number]. WEGOVY INJ 2.4MG is approved through 07/18/2024. Your patient may now fill this prescription and it will be covered.

## 2024-04-10 DIAGNOSIS — G47.00 INSOMNIA, UNSPECIFIED TYPE: ICD-10-CM

## 2024-04-10 NOTE — TELEPHONE ENCOUNTER
Medication(s) to Refill:   Requested Prescriptions     Pending Prescriptions Disp Refills    ALPRAZOLAM 0.5 MG Oral Tab [Pharmacy Med Name: ALPRAZolam 0.5 MG Oral Tablet] 30 tablet 0     Sig: TAKE 1 TABLET BY MOUTH EVERY  NIGHT AS NEEDED         Reason for Medication Refill being sent to Provider / Reason Protocol Failed:  [] 90 day refill has already been granted  [] Blood Pressure out of range  [] Labs Abnormal/over due  [] Medication not previously prescribed by Provider  [] Non-Protocol Medication  [] Controlled Substance   [x] Due for appointment- no future appointment scheduled  [] No Follow up specified      Last Time Medication was Filled:  12/21/2024      Last Office Visit with PCP: 4/17/2023    When Patient was Due Back to the Office:  one year - due   (from when PCP last addressed condition)    Future Appointments:  Future Appointments   Date Time Provider Department Center   4/17/2024 10:40 AM Yasimne Christian PA-C [facility] [address]         Last Blood Pressures:  BP Readings from Last 2 Encounters:   12/20/23 118/80   09/25/23 (!) 149/91       Action taken:  [] Refill approved per protocol  [x] Routing to provider for approval

## 2024-04-11 RX ORDER — ALPRAZOLAM 0.5 MG/1
0.5 TABLET ORAL NIGHTLY PRN
Qty: 30 TABLET | Refills: 0 | Status: SHIPPED | OUTPATIENT
Start: 2024-04-11

## 2024-06-21 DIAGNOSIS — G47.00 INSOMNIA, UNSPECIFIED TYPE: ICD-10-CM

## 2024-06-21 RX ORDER — ALPRAZOLAM 0.5 MG/1
0.5 TABLET ORAL NIGHTLY PRN
Qty: 30 TABLET | Refills: 0 | OUTPATIENT
Start: 2024-06-21

## 2024-07-01 ENCOUNTER — OFFICE VISIT (OUTPATIENT)
Dept: FAMILY MEDICINE CLINIC | Facility: CLINIC | Age: 49
End: 2024-07-01
Payer: COMMERCIAL

## 2024-07-01 VITALS — HEIGHT: 63 IN | BODY MASS INDEX: 33 KG/M2

## 2024-07-01 DIAGNOSIS — S22.42XD CLOSED FRACTURE OF MULTIPLE RIBS OF LEFT SIDE WITH ROUTINE HEALING, SUBSEQUENT ENCOUNTER: ICD-10-CM

## 2024-07-01 DIAGNOSIS — S42.102D CLOSED FRACTURE OF LEFT SCAPULA WITH ROUTINE HEALING, UNSPECIFIED PART OF SCAPULA, SUBSEQUENT ENCOUNTER: ICD-10-CM
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
## 2024-07-01 PROCEDURE — 99214 OFFICE O/P EST MOD 30 MIN: CPT | Performed by: NURSE PRACTITIONER

## 2024-07-01 RX ORDER — OXYCODONE HYDROCHLORIDE 5 MG/1
5 TABLET ORAL DAILY PRN
COMMUNITY

## 2024-07-01 RX ORDER — ASPIRIN 81 MG/1
TABLET, CHEWABLE ORAL DAILY
COMMUNITY

## 2024-07-01 RX ORDER — CELECOXIB 200 MG/1
200 CAPSULE ORAL 2 TIMES DAILY
Qty: 180 CAPSULE | Refills: 1 | Status: SHIPPED | OUTPATIENT
Start: 2024-07-01 | End: 2024-09-29

## 2024-07-01 RX ORDER — PREGABALIN 50 MG/1
50 CAPSULE ORAL 3 TIMES DAILY
COMMUNITY
End: 2024-07-01

## 2024-07-01 RX ORDER — PREGABALIN 50 MG/1
50 CAPSULE ORAL 3 TIMES DAILY
Refills: 0 | Status: CANCELLED | OUTPATIENT
Start: 2024-07-01

## 2024-07-01 RX ORDER — PREGABALIN 75 MG/1
75 CAPSULE ORAL 3 TIMES DAILY
Qty: 90 CAPSULE | Refills: 0 | Status: SHIPPED | OUTPATIENT
Start: 2024-07-01 | End: 2024-07-31

## 2024-07-01 RX ORDER — METHOCARBAMOL 750 MG/1
750 TABLET, FILM COATED ORAL 4 TIMES DAILY
Qty: 120 TABLET | Refills: 0 | Status: SHIPPED | OUTPATIENT
Start: 2024-07-01 | End: 2024-07-31
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
## 2024-07-15 ENCOUNTER — TELEPHONE (OUTPATIENT)
Dept: PAIN CLINIC | Facility: CLINIC | Age: 49
End: 2024-07-15

## 2024-07-15 NOTE — TELEPHONE ENCOUNTER
Uploaded XR; foot, ankle, clavicle, avomicola, rt knee, tibia fibule, ankle, hand, chest; MRI Rt knee, CV US buplex, arterial, ankle; CT left ankle, CT 3D reconstruction, CT Shoulder, CT lumbar Spine, CT Thoracic Spine, CT Cervical Spine, CT Maxxillofaci. Endorsed to Provider. Disc returned at checkout.

## 2024-07-22 ENCOUNTER — TELEPHONE (OUTPATIENT)
Dept: FAMILY MEDICINE CLINIC | Facility: CLINIC | Age: 49
End: 2024-07-22

## 2024-08-05 ENCOUNTER — PATIENT MESSAGE (OUTPATIENT)
Dept: FAMILY MEDICINE CLINIC | Facility: CLINIC | Age: 49
End: 2024-08-05
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
## 2024-08-05 DIAGNOSIS — S81.802A LEG WOUND, LEFT, INITIAL ENCOUNTER: Primary | ICD-10-CM

## 2024-08-06 ENCOUNTER — TELEPHONE (OUTPATIENT)
Dept: FAMILY MEDICINE CLINIC | Facility: CLINIC | Age: 49
End: 2024-08-06

## 2024-08-06 DIAGNOSIS — S81.802A LEG WOUND, LEFT, INITIAL ENCOUNTER: Primary | ICD-10-CM

## 2024-08-06 RX ORDER — LEVOFLOXACIN 500 MG/1
500 TABLET, FILM COATED ORAL DAILY
Qty: 7 TABLET | Refills: 0 | Status: SHIPPED | OUTPATIENT
Start: 2024-08-06 | End: 2024-08-06

## 2024-08-06 RX ORDER — DOXYCYCLINE HYCLATE 100 MG/1
100 CAPSULE ORAL 2 TIMES DAILY
Qty: 14 CAPSULE | Refills: 0 | Status: SHIPPED | OUTPATIENT
Start: 2024-08-06 | End: 2024-08-13

## 2024-08-06 NOTE — TELEPHONE ENCOUNTER
From: Yasmine Robles  To: Zhanna Davis  Sent: 8/5/2024 6:44 PM CDT  Subject: Wound Care clinic    I’m concerned with the redness and smelling discharge from my wounds over the last week. Should I see a specialist at the wound care clinic? Do I need a referral?    See photos attached     Yasmine

## 2024-08-08 DIAGNOSIS — S22.42XD CLOSED FRACTURE OF MULTIPLE RIBS OF LEFT SIDE WITH ROUTINE HEALING, SUBSEQUENT ENCOUNTER: ICD-10-CM
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
## 2024-08-08 DIAGNOSIS — S42.102D CLOSED FRACTURE OF LEFT SCAPULA WITH ROUTINE HEALING, UNSPECIFIED PART OF SCAPULA, SUBSEQUENT ENCOUNTER: ICD-10-CM
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
## 2024-08-08 RX ORDER — PREGABALIN 75 MG/1
75 CAPSULE ORAL 3 TIMES DAILY
Qty: 90 CAPSULE | Refills: 0 | Status: SHIPPED | OUTPATIENT
Start: 2024-08-08 | End: 2024-08-09

## 2024-08-09 DIAGNOSIS — S42.102D CLOSED FRACTURE OF LEFT SCAPULA WITH ROUTINE HEALING, UNSPECIFIED PART OF SCAPULA, SUBSEQUENT ENCOUNTER: ICD-10-CM

## 2024-08-09 DIAGNOSIS — S22.42XD CLOSED FRACTURE OF MULTIPLE RIBS OF LEFT SIDE WITH ROUTINE HEALING, SUBSEQUENT ENCOUNTER: ICD-10-CM
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
## 2024-08-09 DIAGNOSIS — F11.90 OPIATE USE: ICD-10-CM

## 2024-08-09 RX ORDER — OXYCODONE HYDROCHLORIDE 5 MG/1
5 TABLET ORAL DAILY PRN
Qty: 30 TABLET | Refills: 0 | Status: SHIPPED | OUTPATIENT
Start: 2024-08-09 | End: 2024-09-08

## 2024-08-09 RX ORDER — PREGABALIN 75 MG/1
75 CAPSULE ORAL 3 TIMES DAILY
Qty: 90 CAPSULE | Refills: 0 | Status: SHIPPED | OUTPATIENT
Start: 2024-08-09

## 2024-08-09 NOTE — TELEPHONE ENCOUNTER
Contacted pt at this  to see how she was doing with her oxycodone weaning. Pt stated that she is doing well and is ready to reduce to one dose per day. According to the patient, the only issue is that the pharmacist is giving her a seven-day supply of medication. We informed pt that we would handle her refill request at this time and asked her to let us know if the pharmacy wanted us to perform PA or if there were any concerns with this refill. Pt vu, and no additional inquiries at this moment.
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
## 2024-08-12 ENCOUNTER — OFFICE VISIT (OUTPATIENT)
Dept: FAMILY MEDICINE CLINIC | Facility: CLINIC | Age: 49
End: 2024-08-12
Payer: COMMERCIAL

## 2024-08-12 ENCOUNTER — LAB ENCOUNTER (OUTPATIENT)
Dept: LAB | Age: 49
End: 2024-08-12
Attending: FAMILY MEDICINE
Payer: COMMERCIAL

## 2024-08-12 VITALS
WEIGHT: 218 LBS | DIASTOLIC BLOOD PRESSURE: 78 MMHG | OXYGEN SATURATION: 97 % | HEIGHT: 63 IN | HEART RATE: 96 BPM | BODY MASS INDEX: 38.62 KG/M2 | SYSTOLIC BLOOD PRESSURE: 138 MMHG

## 2024-08-12 DIAGNOSIS — Z13.29 SCREENING FOR ENDOCRINE, NUTRITIONAL, METABOLIC AND IMMUNITY DISORDER: ICD-10-CM

## 2024-08-12 DIAGNOSIS — R60.0 EDEMA OF EXTREMITIES: ICD-10-CM

## 2024-08-12 DIAGNOSIS — Z12.31 VISIT FOR SCREENING MAMMOGRAM: ICD-10-CM

## 2024-08-12 DIAGNOSIS — Z00.00 LABORATORY EXAMINATION ORDERED AS PART OF A ROUTINE GENERAL MEDICAL EXAMINATION: ICD-10-CM

## 2024-08-12 DIAGNOSIS — Z00.00 LABORATORY EXAM ORDERED AS PART OF ROUTINE GENERAL MEDICAL EXAMINATION: ICD-10-CM

## 2024-08-12 DIAGNOSIS — Z13.228 SCREENING FOR ENDOCRINE, NUTRITIONAL, METABOLIC AND IMMUNITY DISORDER: ICD-10-CM

## 2024-08-12 DIAGNOSIS — Z13.21 SCREENING FOR ENDOCRINE, NUTRITIONAL, METABOLIC AND IMMUNITY DISORDER: ICD-10-CM

## 2024-08-12 DIAGNOSIS — Z13.0 SCREENING FOR ENDOCRINE, NUTRITIONAL, METABOLIC AND IMMUNITY DISORDER: ICD-10-CM

## 2024-08-12 DIAGNOSIS — Z00.00 ROUTINE GENERAL MEDICAL EXAMINATION AT A HEALTH CARE FACILITY: Primary | ICD-10-CM

## 2024-08-12 LAB
ALBUMIN SERPL-MCNC: 4.7 G/DL (ref 3.2–4.8)
ALBUMIN/GLOB SERPL: 1.6 {RATIO} (ref 1–2)
ALP LIVER SERPL-CCNC: 122 U/L
ALT SERPL-CCNC: 20 U/L
ANION GAP SERPL CALC-SCNC: 7 MMOL/L (ref 0–18)
AST SERPL-CCNC: 22 U/L (ref ?–34)
BILIRUB SERPL-MCNC: 0.2 MG/DL (ref 0.3–1.2)
BUN BLD-MCNC: 18 MG/DL (ref 9–23)
CALCIUM BLD-MCNC: 10.2 MG/DL (ref 8.7–10.4)
CHLORIDE SERPL-SCNC: 103 MMOL/L (ref 98–112)
CHOLEST SERPL-MCNC: 237 MG/DL (ref ?–200)
CO2 SERPL-SCNC: 27 MMOL/L (ref 21–32)
CREAT BLD-MCNC: 0.79 MG/DL
D DIMER PPP FEU-MCNC: 1.43 UG/ML FEU (ref ?–0.5)
EGFRCR SERPLBLD CKD-EPI 2021: 92 ML/MIN/1.73M2 (ref 60–?)
FASTING PATIENT LIPID ANSWER: YES
FASTING STATUS PATIENT QL REPORTED: YES
GLOBULIN PLAS-MCNC: 2.9 G/DL (ref 2–3.5)
GLUCOSE BLD-MCNC: 84 MG/DL (ref 70–99)
HDLC SERPL-MCNC: 65 MG/DL (ref 40–59)
LDLC SERPL CALC-MCNC: 142 MG/DL (ref ?–100)
NONHDLC SERPL-MCNC: 172 MG/DL (ref ?–130)
OSMOLALITY SERPL CALC.SUM OF ELEC: 285 MOSM/KG (ref 275–295)
POTASSIUM SERPL-SCNC: 4.5 MMOL/L (ref 3.5–5.1)
PROT SERPL-MCNC: 7.6 G/DL (ref 5.7–8.2)
SODIUM SERPL-SCNC: 137 MMOL/L (ref 136–145)
TRIGL SERPL-MCNC: 171 MG/DL (ref 30–149)
TSI SER-ACNC: 5.33 MIU/ML (ref 0.55–4.78)
VIT B12 SERPL-MCNC: 527 PG/ML (ref 211–911)
VIT D+METAB SERPL-MCNC: 45.8 NG/ML (ref 30–100)
VLDLC SERPL CALC-MCNC: 32 MG/DL (ref 0–30)

## 2024-08-12 PROCEDURE — 85379 FIBRIN DEGRADATION QUANT: CPT | Performed by: FAMILY MEDICINE

## 2024-08-12 PROCEDURE — 82607 VITAMIN B-12: CPT | Performed by: FAMILY MEDICINE

## 2024-08-12 PROCEDURE — 84443 ASSAY THYROID STIM HORMONE: CPT | Performed by: FAMILY MEDICINE

## 2024-08-12 PROCEDURE — 80053 COMPREHEN METABOLIC PANEL: CPT | Performed by: FAMILY MEDICINE

## 2024-08-12 PROCEDURE — 82306 VITAMIN D 25 HYDROXY: CPT | Performed by: FAMILY MEDICINE

## 2024-08-12 PROCEDURE — 80061 LIPID PANEL: CPT | Performed by: FAMILY MEDICINE

## 2024-08-12 NOTE — PATIENT INSTRUCTIONS
[address]    [facility]                   [place]                        [address] [place] [address],[address]                 [phone]    [place] [id number], [facility] A below ER  Call [phone] for an appointment    [address]  Call [phone] for an appointment        [facility]  [address] [facility], [address]  [place] [address]  Phone:  [phone]  Fax:  [phone]  Email:  [email or web address]

## 2024-08-13 ENCOUNTER — TELEPHONE (OUTPATIENT)
Dept: FAMILY MEDICINE CLINIC | Facility: CLINIC | Age: 49
End: 2024-08-13

## 2024-08-13 DIAGNOSIS — R60.9 EDEMA, UNSPECIFIED TYPE: ICD-10-CM

## 2024-08-13 DIAGNOSIS — R79.89 ELEVATED D-DIMER: Primary | ICD-10-CM

## 2024-08-13 DIAGNOSIS — Z74.09 PROLONGED IMMOBILIZATION: ICD-10-CM

## 2024-08-13 NOTE — TELEPHONE ENCOUNTER
Pt notifieid of lab results & stat US orders. Phone # for Central Sched given to pt. Pt expresses understanding.

## 2024-08-13 NOTE — TELEPHONE ENCOUNTER
----- Message from Zhanna Davis sent at 8/13/2024 11:23 AM CDT -----  Lets do US doppler stat for both legs, dgn edema, immobilization.

## 2024-08-14 ENCOUNTER — HOSPITAL ENCOUNTER (OUTPATIENT)
Dept: ULTRASOUND IMAGING | Facility: HOSPITAL | Age: 49
Discharge: HOME | End: 2024-08-14
Attending: FAMILY MEDICINE
Payer: COMMERCIAL

## 2024-08-14 ENCOUNTER — TELEPHONE (OUTPATIENT)
Dept: FAMILY MEDICINE CLINIC | Facility: CLINIC | Age: 49
End: 2024-08-14

## 2024-08-14 DIAGNOSIS — R60.9 EDEMA, UNSPECIFIED TYPE: ICD-10-CM

## 2024-08-14 DIAGNOSIS — R79.89 ELEVATED D-DIMER: ICD-10-CM

## 2024-08-14 DIAGNOSIS — I82.442 DEEP VEIN THROMBOSIS (DVT) OF TIBIAL VEIN OF LEFT LOWER EXTREMITY, UNSPECIFIED CHRONICITY (HCC): Primary | ICD-10-CM

## 2024-08-14 DIAGNOSIS — Z74.09 PROLONGED IMMOBILIZATION: ICD-10-CM

## 2024-08-14 PROCEDURE — 93970 EXTREMITY STUDY: CPT | Performed by: FAMILY MEDICINE

## 2024-08-14 NOTE — TELEPHONE ENCOUNTER
----- Message from Zhanna Davis sent at 8/14/2024  3:06 PM CDT -----  I left pt a message about DVT , please call pt and make sure no chest pressure or palpitations etc.  I sent Xarelto, if no chest pain f/u with me in one month.

## 2024-08-14 NOTE — TELEPHONE ENCOUNTER
Denies chest pain or palpitation   Patient has to take her daughter to college, she is asking if it would be better to fly there for an hour and a half flight or take the 9 hour car ride.    Please advise

## 2024-08-15 ENCOUNTER — TELEPHONE (OUTPATIENT)
Dept: HEMATOLOGY/ONCOLOGY | Facility: HOSPITAL | Age: 49
End: 2024-08-15

## 2024-08-15 NOTE — TELEPHONE ENCOUNTER
Fly is better, no PT on the legs at all now, I would like pt to see our hematology group too, and will be good to check chest so ok for CTA chest.

## 2024-08-15 NOTE — TELEPHONE ENCOUNTER
New Consult for Deep vein thrombosis (DVT) of tibial vein of left lower extremity, unspecified c... Referred by Zhanna Gamboa to see Dr. CAMACHO Sharma. Called 8/15/24.

## 2024-08-19 NOTE — TELEPHONE ENCOUNTER
Yes I know it can be taken but lets ask pt if she wants Tramadol or something stronger, I suggest Tramadol.

## 2024-08-19 NOTE — TELEPHONE ENCOUNTER
Called [facility], talked with pharmacist Norma who states yes pt can take xarelto and tramadol together  Please advise

## 2024-08-19 NOTE — TELEPHONE ENCOUNTER
Pt states pharmacist informed her she cannot take celebrex and xarelto together   Pt stopped celebrex and pain has increased  Taking tylenol but its not working  Requesting for alternative for pain

## 2024-08-22 ENCOUNTER — TELEPHONE (OUTPATIENT)
Dept: FAMILY MEDICINE CLINIC | Facility: CLINIC | Age: 49
End: 2024-08-22

## 2024-08-23 ENCOUNTER — HOSPITAL ENCOUNTER (OUTPATIENT)
Dept: CT IMAGING | Facility: HOSPITAL | Age: 49
Discharge: HOME | End: 2024-08-23
Attending: FAMILY MEDICINE
Payer: COMMERCIAL

## 2024-08-23 DIAGNOSIS — I82.442 DEEP VEIN THROMBOSIS (DVT) OF TIBIAL VEIN OF LEFT LOWER EXTREMITY, UNSPECIFIED CHRONICITY (HCC): ICD-10-CM

## 2024-08-23 PROCEDURE — 71275 CT ANGIOGRAPHY CHEST: CPT | Performed by: FAMILY MEDICINE

## 2024-08-26 ENCOUNTER — OFFICE VISIT (OUTPATIENT)
Dept: WOUND CARE | Facility: HOSPITAL | Age: 49
End: 2024-08-26
Attending: INTERNAL MEDICINE
Payer: COMMERCIAL

## 2024-08-26 VITALS
RESPIRATION RATE: 16 BRPM | TEMPERATURE: 98 F | DIASTOLIC BLOOD PRESSURE: 84 MMHG | WEIGHT: 210 LBS | BODY MASS INDEX: 37.21 KG/M2 | SYSTOLIC BLOOD PRESSURE: 131 MMHG | HEART RATE: 80 BPM | HEIGHT: 63 IN

## 2024-08-26 DIAGNOSIS — T81.89XA NON-HEALING SURGICAL WOUND, INITIAL ENCOUNTER: ICD-10-CM

## 2024-08-26 DIAGNOSIS — T14.8XXD DELAYED WOUND HEALING: ICD-10-CM

## 2024-08-26 DIAGNOSIS — I87.303 CHRONIC VENOUS HYPERTENSION INVOLVING BOTH SIDES: ICD-10-CM

## 2024-08-26 DIAGNOSIS — L30.8 DERMATITIS ASSOCIATED WITH MOISTURE: ICD-10-CM

## 2024-08-26 DIAGNOSIS — M79.89 LEFT LEG SWELLING: ICD-10-CM

## 2024-08-26 DIAGNOSIS — E66.9 OBESITY (BMI 30-39.9): ICD-10-CM

## 2024-08-26 DIAGNOSIS — L97.222 NON-PRESSURE CHRONIC ULCER OF LEFT CALF WITH FAT LAYER EXPOSED (HCC): Primary | ICD-10-CM

## 2024-08-26 PROBLEM — K21.9 GASTRO-ESOPHAGEAL REFLUX DISEASE WITHOUT ESOPHAGITIS: Status: ACTIVE | Noted: 2024-05-31

## 2024-08-26 PROBLEM — S42.022D DISPLACED FRACTURE OF SHAFT OF LEFT CLAVICLE, SUBSEQUENT ENCOUNTER FOR FRACTURE WITH ROUTINE HEALING: Status: ACTIVE | Noted: 2024-05-31
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
## 2024-08-26 PROBLEM — S89.91XA INJURY OF POSTEROLATERAL CORNER OF RIGHT KNEE: Status: ACTIVE | Noted: 2024-08-26

## 2024-08-26 PROBLEM — S83.511A SPRAIN OF ANTERIOR CRUCIATE LIGAMENT OF RIGHT KNEE: Status: ACTIVE | Noted: 2024-05-31

## 2024-08-26 PROBLEM — M62.838 OTHER MUSCLE SPASM: Status: ACTIVE | Noted: 2024-05-31

## 2024-08-26 PROBLEM — S01.81XD: Status: ACTIVE | Noted: 2024-05-31

## 2024-08-26 PROBLEM — S82.202E: Status: ACTIVE | Noted: 2024-05-31

## 2024-08-26 PROBLEM — T79.8XXS: Status: ACTIVE | Noted: 2024-05-31

## 2024-08-26 PROBLEM — E78.5 HYPERLIPIDEMIA, UNSPECIFIED: Status: ACTIVE | Noted: 2024-05-31

## 2024-08-26 PROBLEM — I10 ESSENTIAL (PRIMARY) HYPERTENSION: Status: ACTIVE | Noted: 2024-05-31

## 2024-08-26 PROBLEM — S92.302D: Status: ACTIVE | Noted: 2024-05-31

## 2024-08-26 PROBLEM — S76.319A: Status: ACTIVE | Noted: 2024-08-26

## 2024-08-26 PROBLEM — K59.00 CONSTIPATION, UNSPECIFIED: Status: ACTIVE | Noted: 2024-05-31

## 2024-08-26 PROBLEM — S83.104A: Status: ACTIVE | Noted: 2024-08-26
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
## 2024-08-26 PROBLEM — R26.89 OTHER ABNORMALITIES OF GAIT AND MOBILITY: Status: ACTIVE | Noted: 2024-05-31

## 2024-08-26 PROBLEM — S42.102D: Status: ACTIVE | Noted: 2024-05-31

## 2024-08-26 PROBLEM — S22.42XA MULTIPLE CLOSED FRACTURES OF RIBS OF LEFT SIDE: Status: ACTIVE | Noted: 2024-05-31

## 2024-08-26 PROBLEM — F32.A DEPRESSION, UNSPECIFIED: Status: ACTIVE | Noted: 2024-05-31

## 2024-08-26 PROBLEM — R60.0 LOCALIZED EDEMA: Status: ACTIVE | Noted: 2024-05-31

## 2024-08-26 PROBLEM — G57.31 PERONEAL NERVE PALSY, RIGHT: Status: ACTIVE | Noted: 2024-08-26

## 2024-08-26 PROCEDURE — 11045 DBRDMT SUBQ TISS EACH ADDL: CPT | Performed by: INTERNAL MEDICINE

## 2024-08-26 PROCEDURE — 29581 APPL MULTLAYER CMPRN SYS LEG: CPT

## 2024-08-26 PROCEDURE — 99215 OFFICE O/P EST HI 40 MIN: CPT

## 2024-08-26 PROCEDURE — 11042 DBRDMT SUBQ TIS 1ST 20SQCM/<: CPT | Performed by: INTERNAL MEDICINE

## 2024-08-26 PROCEDURE — 87070 CULTURE OTHR SPECIMN AEROBIC: CPT | Performed by: INTERNAL MEDICINE
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
## 2024-08-26 NOTE — PROGRESS NOTES
.Weekly Wound Education Note    Teaching Provided To: Patient  Training Topics: Dressing;Edema control;Discharge instructions;Compression  Training Method: Explain/Verbal;Written  Training Response: Patient responds and understands            Betamethasone to bradford area of wounds.  Honey gel, honey alginate to all wounds, covered with kerramax.  Comprilan wraps 8, 10, 12.  Medial leg wound cultured this visit.  Patient will return later this week for a nurse visit.

## 2024-08-26 NOTE — PROGRESS NOTES
Patient ID: Yasmine Robles is a 48 year old female.    Debridement Venous Ulcer Left Leg   Wound 08/26/24 #1 Left Medial Superior Leg Left    Performed by: Juno Billings MD  Authorized by: Juno Billings MD      Consent   Consent obtained? verbal  Consent given by: patient    Debridement Details  Performed by: physician  Debridement type: surgical  Level of debridement: subcutaneous tissue  Pain control: lidocaine 4%  Pain control administration type: topical    Pre-debridement measurements  Length (cm): 17  Width (cm): 5.6  Depth (cm): 0.5  Surface Area (cm^2): 95.2    Post-debridement measurements  Length (cm): 17  Width (cm): 5.6  Depth (cm): 0.6  Percent debrided: 90%  Surface Area (cm^2): 95.2  Area Debrided (cm^2): 85.68  Volume (cm^3): 57.12    Tissue and other material debrided: subcutaneous tissue  Devitalized tissue debrided: biofilm, necrotic debris and slough  Instrument(s) utilized: blade, forceps and curette  Bleeding: small  Hemostasis obtained with: pressure  Procedural pain (0-10): 0  Post-procedural pain: 0   Response to treatment: procedure was tolerated well    Debridement Venous Ulcer Left Tibial   Wound 08/26/24 #2 Left Medial Inferior Tibial Left    Performed by: Juno Billings MD  Authorized by: Juno Billings MD      Consent   Consent obtained? verbal  Consent given by: patient    Debridement Details  Performed by: physician  Debridement type: surgical  Level of debridement: subcutaneous tissue  Pain control: lidocaine 4%  Pain control administration type: topical    Pre-debridement measurements  Length (cm): 1.2  Width (cm): 5  Depth (cm): 0.1  Surface Area (cm^2): 6    Post-debridement measurements  Length (cm): 1.3  Width (cm): 5.1  Depth (cm): 0.2  Percent debrided: 40%  Surface Area (cm^2): 6.63  Area Debrided (cm^2): 2.65  Volume (cm^3): 1.33    Tissue and other material debrided: subcutaneous tissue  Devitalized tissue debrided: biofilm, necrotic debris and  slough  Instrument(s) utilized: forceps and blade  Bleeding: small  Hemostasis obtained with: pressure  Procedural pain (0-10): 0  Post-procedural pain: 0   Response to treatment: procedure was tolerated well    Debridement Venous Ulcer Left Leg   Wound 08/26/24 #3 Left Lateral Leg Leg Left    Performed by: Juno Billings MD  Authorized by: Juno Billings MD      Consent   Consent obtained? verbal  Consent given by: patient    Debridement Details  Performed by: physician  Debridement type: surgical  Level of debridement: subcutaneous tissue  Pain control: lidocaine 4%  Pain control administration type: topical    Pre-debridement measurements  Length (cm): 12.8  Width (cm): 1.4  Depth (cm): 0.1  Surface Area (cm^2): 17.92    Post-debridement measurements  Length (cm): 12.8  Width (cm): 1.4  Depth (cm): 0.2  Percent debrided: 10%  Surface Area (cm^2): 17.92  Area Debrided (cm^2): 1.79  Volume (cm^3): 3.58    Tissue and other material debrided: subcutaneous tissue  Devitalized tissue debrided: biofilm, necrotic debris and slough  Instrument(s) utilized: forceps, curette and blade  Bleeding: small  Hemostasis obtained with: pressure  Procedural pain (0-10): 0  Post-procedural pain: 0   Response to treatment: procedure was tolerated well

## 2024-08-26 NOTE — PATIENT INSTRUCTIONS
Return next Wednesday to see me for follow-up - 9/4/24  Nurse visit later this week   Wound culture done today.   Low salt diet  Leg elevation  Watch out for s/o infection    Wound Cleaning and Dressings:    Shower with protection  Use shower boot  DRESSINGS: honey gel / honey alginate / kerramax  Zinc barrier cream periwound  Change dressing in clinic only    Compression Therapy : UNNA 30-40 mm hg  Compression Therapy Instructions:  1.  Put on first thing in the morning and may remove at bedside.  Okay to wear overnight      if comfortable.  Do not let stockings roll up/down and kink.  Hand wash stockings and      hang dry as needed.    2.  Avoid prolonged standing in one place.  It is better to have your calf muscles moving       to pump fluid out of the legs.    3.  Elevate leg(s) above the level of the heart when sitting or as much as possible.    4.  Take your diuretics as directed by your provider.  Do not skip doses or change doses      unless instructed to do so by your provider.    5. Do not get leg(s) with compression wrap wet. If wraps are too tight as indicated        By pain, numbness/tingling or discoloration of toes remove wrap completely       and call the   wound center.     There are no questions and answers to display.        Off-Loading:    Miscellaneous Instructions:  Supplement with a daily multivitamin   Low salt diet  Intense blood sugar control - Goal Blood sugar below 180 at all times recommended.  Increase protein intake / consider protein supplements - see below  Elevate extremities at all times when sitting / laying down.    DIETARY MODIFICATIONS TO HELP WITH WOUND HEALING:    Protein: Meats, beans, eggs, milk and yogurt particularly Greek yogurt), tofu, soy nuts, soy protein products    Vitamin C: Citrus fruits and juices, strawberries, tomatoes, tomato juice, peppers, baked potatoes, spinach, broccoli, cauliflower, [place] sprouts, cabbage    Vitamin A: Dark green, leafy  vegetables, orange or yellow vegetables, cantaloupe, fortified dairy products, liver, fortified cereals    Zinc: Fortified cereals, red meats, seafood    Consider Alexys by [employer] (These are essential branch chain amino acids that help with tissue building and wound healing) and take 2 packets/day. you can order online at [employer] or [employer]    ADDITIONAL REMINDERS:    The treatment plan has been discussed at length with you and your provider. Follow all instructions carefully, it is very important. If you do not follow all instructions, you are at  risk of your wound not healing, infection, possible loss of limb and even end of life.  Please call the clinic during regular business hours ( 7:30 AM - 5:30 PM) if you notice increased bleeding, redness, warmth, pain or pus like drainage or start running a fever greater than 100.3.    For after hour emergencies, please call your primary physician or go to the nearest emergency room.

## 2024-08-30 ENCOUNTER — OFFICE VISIT (OUTPATIENT)
Dept: WOUND CARE | Facility: HOSPITAL | Age: 49
End: 2024-08-30
Attending: INTERNAL MEDICINE
Payer: COMMERCIAL

## 2024-08-30 VITALS
RESPIRATION RATE: 16 BRPM | HEART RATE: 80 BPM | TEMPERATURE: 99 F | SYSTOLIC BLOOD PRESSURE: 124 MMHG | DIASTOLIC BLOOD PRESSURE: 78 MMHG

## 2024-08-30 DIAGNOSIS — L97.222 NON-PRESSURE CHRONIC ULCER OF LEFT CALF WITH FAT LAYER EXPOSED (HCC): Primary | ICD-10-CM

## 2024-08-30 PROCEDURE — 29581 APPL MULTLAYER CMPRN SYS LEG: CPT

## 2024-08-30 NOTE — PROGRESS NOTES
Chief Complaint   Patient presents with    Wound Care     Pt here for nurse visit to left lateral leg, left medial inferior and superior leg wound. Pt denies any concerns or issues, pt arrived in [facility] wrap. Pt states having increased pain in the wound in the last 24hrs, states feeling like shocks.            Current Outpatient Medications:     levoFLOXacin (LEVAQUIN) 500 MG Oral Tab, Take 1 tablet (500 mg total) by mouth daily for 7 days., Disp: 7 tablet, Rfl: 0    traMADol 50 MG Oral Tab, Take 1 tablet (50 mg total) by mouth every 6 (six) hours as needed for Pain. (Patient not taking: Reported on 8/26/2024), Disp: 40 tablet, Rfl: 2    rivaroxaban (XARELTO) 15 MG Oral Tab, Take 1 tablet (15 mg total) by mouth 2 (two) times daily with meals for 21 days. Starting dose for 21 days for DVT, no aspirin or NSAIDs with this, Disp: 42 tablet, Rfl: 0    rivaroxaban (XARELTO) 20 MG Oral Tab, Take 1 tablet (20 mg total) by mouth daily with food. After done with 21 days dose, Disp: 30 tablet, Rfl: 2    pregabalin 75 MG Oral Cap, Take 1 capsule (75 mg total) by mouth 3 (three) times daily., Disp: 90 capsule, Rfl: 0    oxyCODONE 5 MG Oral Tab, Take 1 tablet (5 mg total) by mouth daily as needed for Pain., Disp: 30 tablet, Rfl: 0    acetaminophen 500 MG Oral Tab, Take 1 tablet (500 mg total) by mouth in the morning, at noon, and at bedtime. (Patient not taking: Reported on 8/12/2024), Disp: , Rfl:     ALPRAZolam 0.5 MG Oral Tab, Take 1 tablet (0.5 mg total) by mouth nightly as needed., Disp: 30 tablet, Rfl: 0    ESCITALOPRAM 20 MG Oral Tab, TAKE 1 TABLET BY MOUTH DAILY, Disp: 30 tablet, Rfl: 11    progesterone 200 MG Oral Cap, , Disp: , Rfl:     Thyroid 65 MG Oral Tab, Take 1 tablet (65 mg total) by mouth daily., Disp: , Rfl:     cholecalciferol (VITAMIN D3) 125 MCG (5000 UT) Oral Cap, Take 1 capsule (5,000 Units total) by mouth daily., Disp: , Rfl:     Multiple Vitamin (MULTI-VITAMIN) Oral Tab, Take 1 tablet by mouth  daily., Disp: , Rfl:     magnesium 30 MG Oral Tab, Take 1 tablet (30 mg total) by mouth 2 (two) times daily. Unsure of dose, Disp: , Rfl:     acidophilus-pectin Oral Cap, Take 1 capsule by mouth daily. (Patient not taking: Reported on 8/12/2024), Disp: , Rfl:     Ferrous Sulfate (IRON OR), Take by mouth., Disp: , Rfl:     Omega-3 Fatty Acids (FISH OIL OR), Take by mouth., Disp: , Rfl:     levothyroxine 75 MCG Oral Tab, Take 1 tablet (75 mcg total) by mouth daily., Disp: , Rfl:     Allergies   Allergen Reactions    Pcn [Penicillins] HIVES    Keflex RASH and ITCHING          HISTORY:     Past medical, surgical, family and social history updated where appropriate.    PHYSICAL EXAM:   /78   Pulse 80   Temp 98.5 °F (36.9 °C)   Resp 16   LMP 05/02/2023 (Approximate)        Vital signs reviewed.      Calf  Point of Measurement - Left Calf: 34     Left Calf from:: Heel  Calf Left cm:: 40.5          Ankle  Point of Measurement - Left Ankle: 11     Left Ankle from:: Heel  Left Ankle cm:: 28.4                Wound 09/25/23 Incision Umbilicus (Active)   Date First Assessed/Time First Assessed: 09/25/23 0800   Primary Wound Type: Incision  Location: Umbilicus      Assessments 9/25/2023  8:04 AM 9/25/2023 10:30 AM   Closure Sutures --   Drainage Amount -- Small   Dressing 4x4s;Steri strips;Mastisol;Tape 4x4s;Steri strips;Mastisol;Tape   Dressing Status -- Intact;New Drainage       No associated orders.       Wound 08/26/24 #1 Left Medial Superior Leg Left (Active)   Date First Assessed/Time First Assessed: 08/26/24 0905    Wound Number (Wound Clinic Only): #1 Left Medial Superior  Primary Wound Type: Venous Ulcer  Location: Leg  Wound Location Orientation: Left      Assessments 8/26/2024  9:08 AM 8/30/2024 10:46 AM   Wound Image        Drainage Amount Moderate Moderate   Drainage Description Serosanguineous Serosanguineous   Treatments Compression Compression   Wound Length (cm) 17 cm 13.1 cm   Wound Width (cm) 5.6 cm  6.1 cm   Wound Surface Area (cm^2) 95.2 cm^2 79.91 cm^2   Wound Depth (cm) 0.5 cm 0.6 cm   Wound Volume (cm^3) 47.6 cm^3 47.946 cm^3   Wound Healing % -- -1   Margins Well-defined edges Well-defined edges   Non-staged Wound Description Full thickness Full thickness   Marcella-wound Assessment Edema;Ecchymosis Edema;Induration   Wound Granulation Tissue Pink;Spongy Pink;Spongy   Wound Bed Granulation (%) 5 % 60 %   Wound Bed Epithelium (%) 10 % --   Wound Bed Slough (%) 40 % 40 %   Wound Bed Eschar (%) 45 % --   Wound Odor None None   Shape Bridged --       Inactive Orders   Date Order Priority Status Authorizing Provider   08/26/24 1023 Debridement Venous Ulcer Left Leg Routine Completed Juno Billings MD       Wound 08/26/24 #2 Left Medial Inferior Tibial Left (Active)   Date First Assessed/Time First Assessed: 08/26/24 0905    Wound Number (Wound Clinic Only): #2 Left Medial Inferior  Primary Wound Type: Venous Ulcer  Location: Tibial  Wound Location Orientation: Left      Assessments 8/26/2024  9:10 AM 8/30/2024 10:44 AM   Wound Image        Drainage Amount Small Moderate   Drainage Description Serosanguineous Serosanguineous   Treatments Compression Compression   Wound Length (cm) 1.2 cm 1 cm   Wound Width (cm) 5 cm 4.5 cm   Wound Surface Area (cm^2) 6 cm^2 4.5 cm^2   Wound Depth (cm) 0.1 cm 0.1 cm   Wound Volume (cm^3) 0.6 cm^3 0.45 cm^3   Wound Healing % -- 25   Margins Well-defined edges Well-defined edges   Non-staged Wound Description Full thickness Full thickness   Marcella-wound Assessment Edema;Ecchymosis Edema   Wound Granulation Tissue Pink;Spongy Pink;Spongy   Wound Bed Granulation (%) 10 % 25 %   Wound Bed Epithelium (%) 60 % 50 %   Wound Bed Slough (%) -- 25 %   Wound Bed Eschar (%) 30 % --   Wound Odor None None   Shape Bridged, Hypergranular bridged       Inactive Orders   Date Order Priority Status Authorizing Provider   08/26/24 1030 Debridement Venous Ulcer Left Tibial Routine Completed Jere  Juno SCHWARTZ MD       Wound 08/26/24 #3 Left Lateral Leg Leg Left (Active)   Date First Assessed/Time First Assessed: 08/26/24 0906    Wound Number (Wound Clinic Only): #3 Left Lateral Leg  Primary Wound Type: Venous Ulcer  Location: Leg  Wound Location Orientation: Left      Assessments 8/26/2024  9:11 AM 8/30/2024 10:48 AM   Wound Image         Drainage Amount Small Moderate   Drainage Description Serosanguineous Serosanguineous   Treatments Compression Compression   Wound Length (cm) 12.8 cm 1.6 cm   Wound Width (cm) 1.4 cm 1.5 cm   Wound Surface Area (cm^2) 17.92 cm^2 2.4 cm^2   Wound Depth (cm) 0.1 cm 0.1 cm   Wound Volume (cm^3) 1.792 cm^3 0.24 cm^3   Wound Healing % -- 87   Margins Well-defined edges Well-defined edges   Non-staged Wound Description Full thickness Full thickness   Marcella-wound Assessment Edema;Ecchymosis Edema;Induration   Wound Granulation Tissue -- Pink;Pale Grey;Firm   Wound Bed Granulation (%) -- 70 %   Wound Bed Epithelium (%) 90 % --   Wound Bed Slough (%) -- 30 %   Wound Bed Eschar (%) 10 % --   Wound Odor None None   Shape Bridged --       Inactive Orders   Date Order Priority Status Authorizing Provider   08/26/24 1030 Debridement Venous Ulcer Left Leg Routine Completed Juno Billings MD       Compression Wrap 08/26/24 Leg Anterior;Left (Active)   Placement Date/Time: 08/26/24 1324   Location: Leg  Wound Location Orientation: Anterior;Left      Assessments 8/26/2024  1:24 PM 8/30/2024 10:48 AM   Response to Treatment Well tolerated Well tolerated   Compression Layers Multilayer Multilayer   Compression Product Type Comprilan 8cm;Comprilan 10cm;Comprilan 12cm;Coban;Stockinette 4in Comprilan 8cm;Comprilan 10cm;Comprilan 12cm;Coban;Stockinette 4in   Dressing Applied Yes Yes   Compression Wrap Location Toes to Knee Toes to Knee   Compression Wrap Status Dry;Clean Dry;Clean       No associated orders.          ASSESSMENT AND PLAN:        Risks, benefits, and alternatives of current  treatment plan discussed in detail.  Questions and concerns addressed. Red flags to RTC or ED reviewed.  Patient (or parent) agrees to plan.      No follow-ups on file.  Weekly Wound Education Note    Teaching Provided To: Patient  Training Topics: Discharge instructions;Cleasing and general instructions;Edema control;Dressing;Compression  Training Method: Demonstration;Explain/Verbal  Training Response: Reinforcement needed        Notes: Pt here for nurse visit to left lateral leg, left medial inferior and superior leg wound. Pt denies any concerns or issues, pt arrived in comprilian wrap. Pt states having increased pain in the wound in the last 24hrs, states feeling like shocks. Pt rates pain in wound at 5 on 0-10 scale at this time. Edema measurement decreased, left inferior lower leg wound measurement decreased, left superior lower leg wound measurement decreased, left lateral lower leg wound measurement decreased. Applied betamethasone to periwounds, honey gel, honey alginate (x3)  to all wounds covered with kerramax (x2) , kerlix, tape. Applied comprilian 8cm, 10cm, 12cm, cellona (x2), coban. Pt to follow up on Wednesday 9/4/24 with provider. RN educated patient about providers recommendations related to recent culture completed on 8/30/24 , provider is recommending patient to see Infectious Disease. RN provider patient with information about ID doctor and educated patient to have her ask staff at ID office if provider can just come see her at her next wound clinic appointment. Pt states understanding.        Kranthi CONTEH RN   8/30/2024  10:56 AM

## 2024-09-03 ENCOUNTER — TELEPHONE (OUTPATIENT)
Dept: HEMATOLOGY/ONCOLOGY | Facility: HOSPITAL | Age: 49
End: 2024-09-03

## 2024-09-03 NOTE — TELEPHONE ENCOUNTER
Patient is calling to reschedule canceled 9/4/24 consult with Dr. Alvarado. Please contact patient at [phone]. Called 9/3/24 KM

## 2024-09-03 NOTE — TELEPHONE ENCOUNTER
I returned Yasmine's call. She cancelled her hematology consultation appointment for 9/4/2024 because she is having head cold symptoms with chills, body aches.     I recommended she take a home covid test or she may present to her PCP or an Immediate Care. I asked if she wants to reschedule her appointment now. She agreed. I transferred the call to the PSR's.

## 2024-09-03 NOTE — TELEPHONE ENCOUNTER
Patient is cancelling her appointment for 9/4/24 with Dr. Alvarado because she have Flu like symptoms and chills and body aches. Called 9/3/24

## 2024-09-04 ENCOUNTER — OFFICE VISIT (OUTPATIENT)
Dept: WOUND CARE | Facility: HOSPITAL | Age: 49
End: 2024-09-04
Attending: INTERNAL MEDICINE
Payer: COMMERCIAL

## 2024-09-04 ENCOUNTER — APPOINTMENT (OUTPATIENT)
Dept: HEMATOLOGY/ONCOLOGY | Facility: HOSPITAL | Age: 49
End: 2024-09-04
Attending: INTERNAL MEDICINE
Payer: COMMERCIAL

## 2024-09-04 VITALS
RESPIRATION RATE: 16 BRPM | DIASTOLIC BLOOD PRESSURE: 80 MMHG | HEART RATE: 85 BPM | SYSTOLIC BLOOD PRESSURE: 116 MMHG | TEMPERATURE: 97 F

## 2024-09-04 DIAGNOSIS — I87.303 CHRONIC VENOUS HYPERTENSION INVOLVING BOTH SIDES: ICD-10-CM

## 2024-09-04 DIAGNOSIS — E66.9 OBESITY (BMI 30-39.9): ICD-10-CM

## 2024-09-04 DIAGNOSIS — L08.9 INFECTED WOUND: Primary | ICD-10-CM

## 2024-09-04 DIAGNOSIS — A49.8 PSEUDOMONAS AERUGINOSA INFECTION: ICD-10-CM

## 2024-09-04 DIAGNOSIS — T81.89XA NON-HEALING SURGICAL WOUND, INITIAL ENCOUNTER: ICD-10-CM

## 2024-09-04 DIAGNOSIS — L97.222 NON-PRESSURE CHRONIC ULCER OF LEFT CALF WITH FAT LAYER EXPOSED (HCC): ICD-10-CM

## 2024-09-04 DIAGNOSIS — Z16.12 INFECTION DUE TO ESBL-PRODUCING ESCHERICHIA COLI: ICD-10-CM

## 2024-09-04 DIAGNOSIS — M79.89 LEFT LEG SWELLING: ICD-10-CM

## 2024-09-04 DIAGNOSIS — A49.8 INFECTION DUE TO ESBL-PRODUCING ESCHERICHIA COLI: ICD-10-CM

## 2024-09-04 DIAGNOSIS — T14.8XXD DELAYED WOUND HEALING: ICD-10-CM

## 2024-09-04 DIAGNOSIS — T14.8XXA INFECTED WOUND: Primary | ICD-10-CM

## 2024-09-04 DIAGNOSIS — L30.8 DERMATITIS ASSOCIATED WITH MOISTURE: ICD-10-CM

## 2024-09-04 PROCEDURE — 11042 DBRDMT SUBQ TIS 1ST 20SQCM/<: CPT | Performed by: INTERNAL MEDICINE

## 2024-09-04 PROCEDURE — 11045 DBRDMT SUBQ TISS EACH ADDL: CPT | Performed by: INTERNAL MEDICINE

## 2024-09-04 RX ORDER — SULFAMETHOXAZOLE/TRIMETHOPRIM 800-160 MG
1 TABLET ORAL 2 TIMES DAILY
Qty: 14 TABLET | Refills: 0 | Status: SHIPPED | OUTPATIENT
Start: 2024-09-04 | End: 2024-09-11

## 2024-09-04 RX ORDER — GENTAMICIN SULFATE 1 MG/G
1 OINTMENT TOPICAL 3 TIMES DAILY
Qty: 30 G | Refills: 3 | Status: SHIPPED | OUTPATIENT
Start: 2024-09-04

## 2024-09-04 NOTE — PROGRESS NOTES
Patient ID: Yasmine Robles is a 48 year old female.    Debridement Venous Ulcer Left Leg   Wound 08/26/24 #1 Left Medial Superior Leg Left    Performed by: Juno Billings MD  Authorized by: Juno Billings MD      Consent   Consent obtained? verbal  Consent given by: patient  Risks discussed? procedural risks discussed    Debridement Details  Performed by: physician  Debridement type: surgical  Level of debridement: subcutaneous tissue  Pain control: lidocaine 4%  Pain control administration type: topical    Pre-debridement measurements  Length (cm): 12.9  Width (cm): 5.6  Depth (cm): 0.8  Surface Area (cm^2): 72.24    Post-debridement measurements  Length (cm): 12.9  Width (cm): 5.6  Depth (cm): 1  Percent debrided: 80%  Surface Area (cm^2): 72.24  Area Debrided (cm^2): 57.79  Volume (cm^3): 72.24    Tissue and other material debrided: hypergranulation and subcutaneous tissue  Devitalized tissue debrided: biofilm and slough  Instrument(s) utilized: curette  Bleeding: medium  Hemostasis obtained with: pressure  Procedural pain (0-10): 0  Post-procedural pain: 0   Response to treatment: procedure was tolerated well

## 2024-09-04 NOTE — PROGRESS NOTES
Weekly Wound Education Note    Teaching Provided To: Patient  Training Topics: Cleasing and general instructions;Compression;Discharge instructions;Dressing;Edema control  Training Method: Explain/Verbal  Training Response: Reinforcement needed;Patient responds and understands        Notes: Improving. Provider discussed cultures, ordering bactrim and gentamycin today. Vashe soak to wounds before dressing application. Betamethasone/triad paste to periwound. Hydrofera transfer (stacked in the larger medial wound), kerramax, kerlix, tape, comprilan 1x8, 1x10, 1x12 applied today. Patient will be seeing Rena NP for the next couple of weeks.

## 2024-09-04 NOTE — PATIENT INSTRUCTIONS
Dressing changes twice a week  Return next Wednesday to see Rena NP on 9/10/24 and 9/17/24  Nurse visits on Fridays / Tuesdays as needed.   See me on 9/27/24  Trial of oral bactrim  Topical gentamicin under wrap during dressing changes.   Low salt diet  Leg elevation  Watch out for s/o infection    Orders Placed This Encounter    Debridement Venous Ulcer Left Leg     This order was created via procedure documentation    sulfamethoxazole-trimethoprim -160 MG Oral Tab per tablet     Sig: Take 1 tablet by mouth 2 (two) times daily for 7 days.     Dispense:  14 tablet     Refill:  0    gentamicin 0.1 % External Ointment     Sig: Apply 1 Application topically 3 (three) times daily.     Dispense:  30 g     Refill:  3       Wound Cleaning and Dressings:    Shower with protection  Use shower boot  DRESSINGS: topical gentamicin / HF transfer /  kerramax  Zinc barrier cream periwound  Change dressing in clinic only    Compression Therapy : UNNA 30-40 mm hg  Compression Therapy Instructions:  1.  Put on first thing in the morning and may remove at bedside.  Okay to wear overnight      if comfortable.  Do not let stockings roll up/down and kink.  Hand wash stockings and      hang dry as needed.    2.  Avoid prolonged standing in one place.  It is better to have your calf muscles moving       to pump fluid out of the legs.    3.  Elevate leg(s) above the level of the heart when sitting or as much as possible.    4.  Take your diuretics as directed by your provider.  Do not skip doses or change doses      unless instructed to do so by your provider.    5. Do not get leg(s) with compression wrap wet. If wraps are too tight as indicated        By pain, numbness/tingling or discoloration of toes remove wrap completely       and call the   wound center.     Miscellaneous Instructions:  Supplement with a daily multivitamin   Low salt diet  Intense blood sugar control - Goal Blood sugar below 180 at all times  recommended.  Increase protein intake / consider protein supplements - see below  Elevate extremities at all times when sitting / laying down.    DIETARY MODIFICATIONS TO HELP WITH WOUND HEALING:    Protein: Meats, beans, eggs, milk and yogurt particularly Greek yogurt), tofu, soy nuts, soy protein products    Vitamin C: Citrus fruits and juices, strawberries, tomatoes, tomato juice, peppers, baked potatoes, spinach, broccoli, cauliflower, [place] sprouts, cabbage    Vitamin A: Dark green, leafy vegetables, orange or yellow vegetables, cantaloupe, fortified dairy products, liver, fortified cereals    Zinc: Fortified cereals, red meats, seafood    Consider Alexys by [employer] (These are essential branch chain amino acids that help with tissue building and wound healing) and take 2 packets/day. you can order online at [employer] or [employer]    ADDITIONAL REMINDERS:    The treatment plan has been discussed at length with you and your provider. Follow all instructions carefully, it is very important. If you do not follow all instructions, you are at  risk of your wound not healing, infection, possible loss of limb and even end of life.  Please call the clinic during regular business hours ( 7:30 AM - 5:30 PM) if you notice increased bleeding, redness, warmth, pain or pus like drainage or start running a fever greater than 100.3.    For after hour emergencies, please call your primary physician or go to the nearest emergency room.

## 2024-09-04 NOTE — PROGRESS NOTES
[facility] PROGRESS NOTE  EDITA JOHNSON MD  9/4/2024    Chief Complaint:   Chief Complaint   Patient presents with    Wound Care     Patient arrives for follow-up. Did have xrays recently. No new concerns. Was able to keep comprilan wraps in place.       HPI:   Subjective   Yasmine Robles is a 48 year old female coming in for a follow-up visit.    HPI    Wound stable / improved  More granulation - but there continues to be slough - debrided down to healthy bleeding tissue.     Culture positive - ESBL E coli and Pseudomonas. Prescribed Levaquin - however pt resistant to take it due to tendon rupture.   ID consult ordered.     However considering that the wound does not have any s/o active infection, I suspect heavy colonization / early infection.   We will do a trial of bactrim and topical gentamicin and VASHE soaks.     If not improving --> consider ID consult at that point.     Review of Systems  Negative except HPI   Denies chest pain / SOB / palpitations  Denies fever.     Allergies  Allergies   Allergen Reactions    Pcn [Penicillins] HIVES    Keflex RASH and ITCHING       Current Meds:  Current Outpatient Medications   Medication Sig Dispense Refill    sulfamethoxazole-trimethoprim -160 MG Oral Tab per tablet Take 1 tablet by mouth 2 (two) times daily for 7 days. 14 tablet 0    gentamicin 0.1 % External Ointment Apply 1 Application topically 3 (three) times daily. 30 g 3    levoFLOXacin (LEVAQUIN) 500 MG Oral Tab Take 1 tablet (500 mg total) by mouth daily for 7 days. 7 tablet 0    traMADol 50 MG Oral Tab Take 1 tablet (50 mg total) by mouth every 6 (six) hours as needed for Pain. (Patient not taking: Reported on 8/26/2024) 40 tablet 2    rivaroxaban (XARELTO) 15 MG Oral Tab Take 1 tablet (15 mg total) by mouth 2 (two) times daily with meals for 21 days. Starting dose for 21 days for DVT, no aspirin or NSAIDs with this 42 tablet 0    rivaroxaban (XARELTO) 20 MG Oral Tab Take 1 tablet (20 mg  total) by mouth daily with food. After done with 21 days dose 30 tablet 2    pregabalin 75 MG Oral Cap Take 1 capsule (75 mg total) by mouth 3 (three) times daily. 90 capsule 0    oxyCODONE 5 MG Oral Tab Take 1 tablet (5 mg total) by mouth daily as needed for Pain. 30 tablet 0    acetaminophen 500 MG Oral Tab Take 1 tablet (500 mg total) by mouth in the morning, at noon, and at bedtime. (Patient not taking: Reported on 8/12/2024)      ALPRAZolam 0.5 MG Oral Tab Take 1 tablet (0.5 mg total) by mouth nightly as needed. 30 tablet 0    ESCITALOPRAM 20 MG Oral Tab TAKE 1 TABLET BY MOUTH DAILY 30 tablet 11    progesterone 200 MG Oral Cap       Thyroid 65 MG Oral Tab Take 1 tablet (65 mg total) by mouth daily.      cholecalciferol (VITAMIN D3) 125 MCG (5000 UT) Oral Cap Take 1 capsule (5,000 Units total) by mouth daily.      Multiple Vitamin (MULTI-VITAMIN) Oral Tab Take 1 tablet by mouth daily.      magnesium 30 MG Oral Tab Take 1 tablet (30 mg total) by mouth 2 (two) times daily. Unsure of dose      acidophilus-pectin Oral Cap Take 1 capsule by mouth daily. (Patient not taking: Reported on 8/12/2024)      Ferrous Sulfate (IRON OR) Take by mouth.      Omega-3 Fatty Acids (FISH OIL OR) Take by mouth.      levothyroxine 75 MCG Oral Tab Take 1 tablet (75 mcg total) by mouth daily.           EXAM:   Objective   Objective    Physical Exam    Vital Signs  Vitals:    09/04/24 0815   BP: 116/80   Pulse: 85   Resp: 16   Temp: 97.3 °F (36.3 °C)       Wound Assessment  Wound 09/25/23 Incision Umbilicus (Active)       Wound 08/26/24 #1 Left Medial Superior Leg Left (Active)   Wound Image    09/04/24 0819   Drainage Amount Large 09/04/24 0819   Drainage Description Serous;Yellow 09/04/24 0819   Treatments Compression 09/04/24 0819   Wound Length (cm) 12.9 cm 09/04/24 0819   Wound Width (cm) 5.6 cm 09/04/24 0819   Wound Surface Area (cm^2) 72.24 cm^2 09/04/24 0819   Wound Depth (cm) 0.8 cm 09/04/24 0819   Wound Volume (cm^3) 57.792  cm^3 09/04/24 0819   Wound Healing % -21 09/04/24 0819   Margins Well-defined edges;Epibole (Rolled edges) 09/04/24 0819   Non-staged Wound Description Full thickness 09/04/24 0819   Marcella-wound Assessment Edema;Induration;Other (Comment);Moist 09/04/24 0819   Wound Granulation Tissue Spongy;Pink 09/04/24 0819   Wound Bed Granulation (%) 80 % 09/04/24 0819   Wound Bed Epithelium (%) 10 % 08/26/24 0908   Wound Bed Slough (%) 20 % 09/04/24 0819   Wound Bed Eschar (%) 45 % 08/26/24 0908   Wound Odor None 09/04/24 0819   Shape Hypergranular 09/04/24 0819   Tunneling? No 09/04/24 0819   Undermining? No 09/04/24 0819   Sinus Tracts? No 09/04/24 0819       Wound 08/26/24 #2 Left Medial Inferior Tibial Left (Active)   Wound Image   09/04/24 0821   Drainage Amount Large 09/04/24 0821   Drainage Description Serous;Yellow 09/04/24 0821   Treatments Compression 09/04/24 0821   Wound Length (cm) 1 cm 09/04/24 0821   Wound Width (cm) 4.4 cm 09/04/24 0821   Wound Surface Area (cm^2) 4.4 cm^2 09/04/24 0821   Wound Depth (cm) 0 cm 09/04/24 0821   Wound Volume (cm^3) 0 cm^3 09/04/24 0821   Wound Healing % 100 09/04/24 0821   Margins Well-defined edges 09/04/24 0821   Non-staged Wound Description Full thickness 09/04/24 0821   Marcella-wound Assessment Edema 09/04/24 0821   Wound Granulation Tissue Pink;Spongy 09/04/24 0821   Wound Bed Granulation (%) 35 % 09/04/24 0821   Wound Bed Epithelium (%) 60 % 09/04/24 0821   Wound Bed Slough (%) 5 % 09/04/24 0821   Wound Bed Eschar (%) 30 % 08/26/24 0910   Wound Odor None 09/04/24 0821   Shape hypergranular, bridged 09/04/24 0821   Tunneling? No 09/04/24 0821   Undermining? No 09/04/24 0821   Sinus Tracts? No 09/04/24 0821       Wound 08/26/24 #3 Left Lateral Leg Leg Left (Active)   Wound Image   09/04/24 0823   Drainage Amount Scant 09/04/24 0823   Drainage Description Serosanguineous 09/04/24 0823   Treatments Compression 09/04/24 0823   Wound Length (cm) 1.1 cm 09/04/24 0823   Wound Width (cm)  0.9 cm 09/04/24 0823   Wound Surface Area (cm^2) 0.99 cm^2 09/04/24 0823   Wound Depth (cm) 0.1 cm 09/04/24 0823   Wound Volume (cm^3) 0.099 cm^3 09/04/24 0823   Wound Healing % 94 09/04/24 0823   Margins Well-defined edges 08/30/24 1048   Non-staged Wound Description Full thickness 08/30/24 1048   Marcella-wound Assessment Edema;Other (Comment) 09/04/24 0823   Wound Granulation Tissue Pink;Firm 09/04/24 0823   Wound Bed Granulation (%) 100 % 09/04/24 0823   Wound Bed Epithelium (%) 90 % 08/26/24 0911   Wound Bed Slough (%) 30 % 08/30/24 1048   Wound Bed Eschar (%) 10 % 08/26/24 0911   Wound Odor None 09/04/24 0823   Shape Bridged 08/26/24 0911   Tunneling? No 09/04/24 0823   Undermining? No 09/04/24 0823   Sinus Tracts? No 09/04/24 0823       Compression Wrap 08/26/24 Leg Anterior;Left (Active)   Response to Treatment Well tolerated 09/04/24 0955   Compression Layers Multilayer 09/04/24 0955   Compression Product Type Comprilan 8cm;Comprilan 10cm;Comprilan 12cm;Coban;[place]inette 4in 09/04/24 0955   Dressing Applied Yes 09/04/24 0955   Compression Wrap Location Toes to Knee 08/30/24 1048   Compression Wrap Status Intact;Dry;Clean 09/04/24 0955           ASSESSMENT AND PLAN:     Assessment     Encounter Diagnosis  1. Infected wound    2. Infection due to ESBL-producing Escherichia coli    3. Pseudomonas aeruginosa infection    4. 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 Dermatitis associated with moisture    6. Chronic venous hypertension involving both sides    7. Non-healing surgical wound, initial encounter    8. Left leg swelling    9. Delayed wound healing    10. Obesity (BMI 30-39.9)      PROCEDURES:    Debridement Venous Ulcer Left Leg   Wound 08/26/24 #1 Left Medial Superior Leg Left     Performed by: Juno Billings MD  Authorized by: Juno Billings MD       Consent   Consent obtained? verbal  Consent given by: patient  Risks discussed? procedural risks discussed      Debridement Details  Performed by: physician  Debridement type: surgical  Level of debridement: subcutaneous tissue  Pain control: lidocaine 4%  Pain control administration type: topical     Pre-debridement measurements  Length (cm): 12.9  Width (cm): 5.6  Depth (cm): 0.8  Surface Area (cm^2): 72.24     Post-debridement measurements  Length (cm): 12.9  Width (cm): 5.6  Depth (cm): 1  Percent debrided: 80%  Surface Area (cm^2): 72.24  Area Debrided (cm^2): 57.79  Volume (cm^3): 72.24     Tissue and other material debrided: hypergranulation and subcutaneous tissue  Devitalized tissue debrided: biofilm and slough  Instrument(s) utilized: curette  Bleeding: medium  Hemostasis obtained with: pressure  Procedural pain (0-10): 0  Post-procedural pain: 0   Response to treatment: procedure was tolerated well         MEDICAL NECESSSITY FOR SURGICAL DEBRIDEMENT:    Surgical debridement is necessary in this patient to stimulate and hasten the rate of wound healing by  removing biofilm and devitalized tissue, down to subcutaneous level.   Research has consistently proven that traditional bedside sharp debridement provides excellent results in reducing the square surface area of wounds. The use of surgical sharp debridement can effectively remove devitalized tissue, hence aids in achieving good wound healing and advancing the rate of wound closure,  and is a proven significant component to advancing wound closure.      PLAN OF CARE:    Continue serial debridements to hasten healing   discussed culture result,   ordered bactrim and gentamycin today.   Vashe soak to wounds before dressing application.   Betamethasone/triad paste to periwound.   Hydrofera transfer (stacked in the larger medial wound), kerramax, kerlix, tape,   Continue compression wraps - comprilan 1x8, 1x10, 1x12 applied today.   Patient will be seeing Rena NP for the next couple of weeks.   Watch out for signs of early infection - counseled.   Plan of care  discussed with patient in detail - All questions answered   Return in one week.     SIGNOUT GIVEN TO MARIA LUISA NP    Orders  Orders Placed This Encounter   Procedures    Debridement Venous Ulcer Left Leg       Meds & Refills for this Visit:  Requested Prescriptions     Signed Prescriptions Disp Refills    sulfamethoxazole-trimethoprim -160 MG Oral Tab per tablet 14 tablet 0     Sig: Take 1 tablet by mouth 2 (two) times daily for 7 days.    gentamicin 0.1 % External Ointment 30 g 3     Sig: Apply 1 Application topically 3 (three) times daily.         Patient Instructions       Dressing changes twice a week  Return next Wednesday to see Maria Luisa NP on 9/10/24 and 9/17/24  Nurse visits on Fridays / Tuesdays as needed.   See me on 9/27/24  Trial of oral bactrim  Topical gentamicin under wrap during dressing changes.   Low salt diet  Leg elevation  Watch out for s/o infection    Orders Placed This Encounter    Debridement Venous Ulcer Left Leg     This order was created via procedure documentation    sulfamethoxazole-trimethoprim -160 MG Oral Tab per tablet     Sig: Take 1 tablet by mouth 2 (two) times daily for 7 days.     Dispense:  14 tablet     Refill:  0    gentamicin 0.1 % External Ointment     Sig: Apply 1 Application topically 3 (three) times daily.     Dispense:  30 g     Refill:  3       Wound Cleaning and Dressings:    Shower with protection  Use shower boot  DRESSINGS: topical gentamicin / HF transfer /  kerramax  Zinc barrier cream periwound  Change dressing in clinic only    Compression Therapy : UNNA 30-40 mm hg  Compression Therapy Instructions:  1.  Put on first thing in the morning and may remove at bedside.  Okay to wear overnight      if comfortable.  Do not let stockings roll up/down and kink.  Hand wash stockings and      hang dry as needed.    2.  Avoid prolonged standing in one place.  It is better to have your calf muscles moving       to pump fluid out of the legs.    3.  Elevate leg(s)  above the level of the heart when sitting or as much as possible.    4.  Take your diuretics as directed by your provider.  Do not skip doses or change doses      unless instructed to do so by your provider.    5. Do not get leg(s) with compression wrap wet. If wraps are too tight as indicated        By pain, numbness/tingling or discoloration of toes remove wrap completely       and call the   wound center.     Miscellaneous Instructions:  Supplement with a daily multivitamin   Low salt diet  Intense blood sugar control - Goal Blood sugar below 180 at all times recommended.  Increase protein intake / consider protein supplements - see below  Elevate extremities at all times when sitting / laying down.    DIETARY MODIFICATIONS TO HELP WITH WOUND HEALING:    Protein: Meats, beans, eggs, milk and yogurt particularly Greek yogurt), tofu, soy nuts, soy protein products    Vitamin C: Citrus fruits and juices, strawberries, tomatoes, tomato juice, peppers, baked potatoes, spinach, broccoli, cauliflower, [place] sprouts, cabbage    Vitamin A: Dark green, leafy vegetables, orange or yellow vegetables, cantaloupe, fortified dairy products, liver, fortified cereals    Zinc: Fortified cereals, red meats, seafood    Consider Alexys by [employer] (These are essential branch chain amino acids that help with tissue building and wound healing) and take 2 packets/day. you can order online at [employer] or [employer]    ADDITIONAL REMINDERS:    The treatment plan has been discussed at length with you and your provider. Follow all instructions carefully, it is very important. If you do not follow all instructions, you are at  risk of your wound not healing, infection, possible loss of limb and even end of life.  Please call the clinic during regular business hours ( 7:30 AM - 5:30 PM) if you notice increased bleeding, redness, warmth, pain or pus like drainage or start running a fever greater than 100.3.    For after hour emergencies, please  call your primary physician or go to the nearest emergency room.        Patient/Caregiver Education: There are no barriers to learning. Medical education for above diagnosis given.   Answered all questions.    Outcome: Patient verbalizes understanding. Patient is notified to call with any questions, complications, allergies, or worsening or changing symptoms.  Patient is to call with any side effects or complications as a result of the treatments today.      DOCUMENTATION OF TIME SPENT: Code selection for this visit was based on time spent : 45 min on date of service in preparing to see the patient, obtaining and/or reviewing separately obtained history, performing a medically appropriate examination, counseling and educating the patient/family/caregiver, ordering medications or testing, referring and communicating with other healthcare providers, documenting clinical information in the E HR, independently interpreting results and communicating results to the patient/family/caregiver and care coordination with the patient's other providers.    Followup: Return in about 1 week (around 9/11/2024) for Wound followup with Rena WARREN.      Note to Patient:  The 21st Century Cures Act makes medical notes like these available to patients in the interest of transparency. However, be advised this is a medical document and is intended as [id number] communication; it is written in medical language and may appear blunt, direct, or contain abbreviations or verbiage that are unfamiliar. Medical documents are intended to carry relevant information, facts as evident, and the clinical opinion of the practitioner.    Also, please note that this report has been produced using speech recognition software and may contain errors related to that system including, but not limited to, errors in grammar, punctuation, and spelling, as well as words and phrases that possibly may have been recognized inappropriately.  If there are any questions  or concerns, contact the dictating provider for clarification.      Juno Oquendo MD  9/4/2024  12:58 PM

## 2024-09-06 ENCOUNTER — OFFICE VISIT (OUTPATIENT)
Dept: WOUND CARE | Facility: HOSPITAL | Age: 49
End: 2024-09-06
Attending: INTERNAL MEDICINE
Payer: COMMERCIAL

## 2024-09-06 VITALS
RESPIRATION RATE: 16 BRPM | HEART RATE: 76 BPM | SYSTOLIC BLOOD PRESSURE: 117 MMHG | DIASTOLIC BLOOD PRESSURE: 77 MMHG | TEMPERATURE: 98 F

## 2024-09-06 DIAGNOSIS — A49.8 PSEUDOMONAS AERUGINOSA INFECTION: ICD-10-CM

## 2024-09-06 DIAGNOSIS — I87.303 CHRONIC VENOUS HYPERTENSION INVOLVING BOTH SIDES: ICD-10-CM

## 2024-09-06 DIAGNOSIS — L97.222 NON-PRESSURE CHRONIC ULCER OF LEFT CALF WITH FAT LAYER EXPOSED (HCC): ICD-10-CM

## 2024-09-06 DIAGNOSIS — A49.8 INFECTION DUE TO ESBL-PRODUCING ESCHERICHIA COLI: ICD-10-CM

## 2024-09-06 DIAGNOSIS — M79.89 LEFT LEG SWELLING: ICD-10-CM

## 2024-09-06 DIAGNOSIS — T81.89XA NON-HEALING SURGICAL WOUND, INITIAL ENCOUNTER: ICD-10-CM

## 2024-09-06 DIAGNOSIS — T14.8XXA INFECTED WOUND: Primary | ICD-10-CM

## 2024-09-06 DIAGNOSIS — Z16.12 INFECTION DUE TO ESBL-PRODUCING ESCHERICHIA COLI: ICD-10-CM

## 2024-09-06 DIAGNOSIS — L08.9 INFECTED WOUND: Primary | ICD-10-CM

## 2024-09-06 DIAGNOSIS — L30.8 DERMATITIS ASSOCIATED WITH MOISTURE: ICD-10-CM

## 2024-09-06 PROCEDURE — 29581 APPL MULTLAYER CMPRN SYS LEG: CPT

## 2024-09-06 NOTE — PROGRESS NOTES
Chief Complaint   Patient presents with    Wound Care     Follow up for left leg wounds. No complaints at this time. Pt brought gentamicin ointment this visit.            Current Outpatient Medications:     sulfamethoxazole-trimethoprim -160 MG Oral Tab per tablet, Take 1 tablet by mouth 2 (two) times daily for 7 days., Disp: 14 tablet, Rfl: 0    gentamicin 0.1 % External Ointment, Apply 1 Application topically 3 (three) times daily., Disp: 30 g, Rfl: 3    traMADol 50 MG Oral Tab, Take 1 tablet (50 mg total) by mouth every 6 (six) hours as needed for Pain. (Patient not taking: Reported on 8/26/2024), Disp: 40 tablet, Rfl: 2    rivaroxaban (XARELTO) 20 MG Oral Tab, Take 1 tablet (20 mg total) by mouth daily with food. After done with 21 days dose, Disp: 30 tablet, Rfl: 2    pregabalin 75 MG Oral Cap, Take 1 capsule (75 mg total) by mouth 3 (three) times daily., Disp: 90 capsule, Rfl: 0    oxyCODONE 5 MG Oral Tab, Take 1 tablet (5 mg total) by mouth daily as needed for Pain., Disp: 30 tablet, Rfl: 0    acetaminophen 500 MG Oral Tab, Take 1 tablet (500 mg total) by mouth in the morning, at noon, and at bedtime. (Patient not taking: Reported on 8/12/2024), Disp: , Rfl:     ALPRAZolam 0.5 MG Oral Tab, Take 1 tablet (0.5 mg total) by mouth nightly as needed., Disp: 30 tablet, Rfl: 0    ESCITALOPRAM 20 MG Oral Tab, TAKE 1 TABLET BY MOUTH DAILY, Disp: 30 tablet, Rfl: 11    progesterone 200 MG Oral Cap, , Disp: , Rfl:     Thyroid 65 MG Oral Tab, Take 1 tablet (65 mg total) by mouth daily., Disp: , Rfl:     cholecalciferol (VITAMIN D3) 125 MCG (5000 UT) Oral Cap, Take 1 capsule (5,000 Units total) by mouth daily., Disp: , Rfl:     Multiple Vitamin (MULTI-VITAMIN) Oral Tab, Take 1 tablet by mouth daily., Disp: , Rfl:     magnesium 30 MG Oral Tab, Take 1 tablet (30 mg total) by mouth 2 (two) times daily. Unsure of dose, Disp: , Rfl:     acidophilus-pectin Oral Cap, Take 1 capsule by mouth daily. (Patient not taking:  Reported on 8/12/2024), Disp: , Rfl:     Ferrous Sulfate (IRON OR), Take by mouth., Disp: , Rfl:     Omega-3 Fatty Acids (FISH OIL OR), Take by mouth., Disp: , Rfl:     levothyroxine 75 MCG Oral Tab, Take 1 tablet (75 mcg total) by mouth daily., Disp: , Rfl:     Allergies   Allergen Reactions    Pcn [Penicillins] HIVES    Keflex RASH and ITCHING          HISTORY:     Past medical, surgical, family and social history updated where appropriate.    PHYSICAL EXAM:   /77   Pulse 76   Temp 98.2 °F (36.8 °C)   Resp 16   LMP 05/02/2023 (Approximate)        Vital signs reviewed.      Calf  Point of Measurement - Left Calf: 34     Left Calf from:: Heel  Calf Left cm:: 38.8          Ankle  Point of Measurement - Left Ankle: 11     Left Ankle from:: Heel  Left Ankle cm:: 27.1                Wound 09/25/23 Incision Umbilicus (Active)   Date First Assessed/Time First Assessed: 09/25/23 0800   Primary Wound Type: Incision  Location: Umbilicus      Assessments 9/25/2023  8:04 AM 9/25/2023 10:30 AM   Closure Sutures --   Drainage Amount -- Small   Dressing 4x4s;Steri strips;Mastisol;Tape 4x4s;Steri strips;Mastisol;Tape   Dressing Status -- Intact;New Drainage       No associated orders.       Wound 08/26/24 #1 Left Medial Superior Leg Left (Active)   Date First Assessed/Time First Assessed: 08/26/24 0905    Wound Number (Wound Clinic Only): #1 Left Medial Superior  Primary Wound Type: Venous Ulcer  Location: Leg  Wound Location Orientation: Left      Assessments 8/26/2024  9:08 AM 9/6/2024  4:34 PM   Wound Image        Drainage Amount Moderate Large   Drainage Description Serosanguineous Serosanguineous   Treatments Compression Compression   Wound Length (cm) 17 cm 12.6 cm   Wound Width (cm) 5.6 cm 5.7 cm   Wound Surface Area (cm^2) 95.2 cm^2 71.82 cm^2   Wound Depth (cm) 0.5 cm 0.6 cm   Wound Volume (cm^3) 47.6 cm^3 43.092 cm^3   Wound Healing % -- 9   Margins Well-defined edges Well-defined edges;Epibole (Rolled edges)    Non-staged Wound Description Full thickness Full thickness   Marcella-wound Assessment Edema;Ecchymosis Edema;Ecchymosis   Wound Granulation Tissue Pink;Spongy Spongy;Pink   Wound Bed Granulation (%) 5 % 95 %   Wound Bed Epithelium (%) 10 % --   Wound Bed Slough (%) 40 % 5 %   Wound Bed Eschar (%) 45 % --   Wound Odor None None   Shape Bridged --   Tunneling? -- No   Undermining? -- No   Sinus Tracts? -- No       Inactive Orders   Date Order Priority Status Authorizing Provider   09/04/24 0910 Debridement Venous Ulcer Left Leg Routine Completed Juno Billings MD   08/26/24 1023 Debridement Venous Ulcer Left Leg Routine Completed Juno Billings MD       Wound 08/26/24 #2 Left Medial Inferior Tibial Left (Active)   Date First Assessed/Time First Assessed: 08/26/24 0905    Wound Number (Wound Clinic Only): #2 Left Medial Inferior  Primary Wound Type: Venous Ulcer  Location: Tibial  Wound Location Orientation: Left      Assessments 8/26/2024  9:10 AM 9/6/2024  4:36 PM   Wound Image        Drainage Amount Small Moderate   Drainage Description Serosanguineous Serosanguineous   Treatments Compression Compression   Wound Length (cm) 1.2 cm 0.9 cm   Wound Width (cm) 5 cm 3.9 cm   Wound Surface Area (cm^2) 6 cm^2 3.51 cm^2   Wound Depth (cm) 0.1 cm 0.1 cm   Wound Volume (cm^3) 0.6 cm^3 0.351 cm^3   Wound Healing % -- 42   Margins Well-defined edges Well-defined edges   Non-staged Wound Description Full thickness Full thickness   Marcella-wound Assessment Edema;Ecchymosis Edema   Wound Granulation Tissue Pink;Spongy Pink;Spongy   Wound Bed Granulation (%) 10 % 40 %   Wound Bed Epithelium (%) 60 % 60 %   Wound Bed Eschar (%) 30 % --   Wound Odor None None   Shape Bridged, Hypergranular Bridged   Tunneling? -- No   Undermining? -- No   Sinus Tracts? -- No       Inactive Orders   Date Order Priority Status Authorizing Provider   08/26/24 1030 Debridement Venous Ulcer Left Tibial Routine Completed Juno Billings MD        Wound 08/26/24 #3 Left Lateral Leg Leg Left (Active)   Date First Assessed/Time First Assessed: 08/26/24 0906    Wound Number (Wound Clinic Only): #3 Left Lateral Leg  Primary Wound Type: Venous Ulcer  Location: Leg  Wound Location Orientation: Left      Assessments 8/26/2024  9:11 AM 9/6/2024  4:37 PM   Wound Image         Drainage Amount Small Small   Drainage Description Serosanguineous Serosanguineous   Treatments Compression Compression   Wound Length (cm) 12.8 cm 0.9 cm   Wound Width (cm) 1.4 cm 0.8 cm   Wound Surface Area (cm^2) 17.92 cm^2 0.72 cm^2   Wound Depth (cm) 0.1 cm 0.1 cm   Wound Volume (cm^3) 1.792 cm^3 0.072 cm^3   Wound Healing % -- 96   Margins Well-defined edges Well-defined edges   Non-staged Wound Description Full thickness Full thickness   Marcella-wound Assessment Edema;Ecchymosis Edema   Wound Granulation Tissue -- Pink;Firm   Wound Bed Granulation (%) -- 100 %   Wound Bed Epithelium (%) 90 % --   Wound Bed Eschar (%) 10 % --   Wound Odor None None   Shape Bridged --   Tunneling? -- No   Undermining? -- No   Sinus Tracts? -- No       Inactive Orders   Date Order Priority Status Authorizing Provider   08/26/24 1030 Debridement Venous Ulcer Left Leg Routine Completed Juno Billings MD       Compression Wrap 08/26/24 Leg Anterior;Left (Active)   Placement Date/Time: 08/26/24 1324   Location: Leg  Wound Location Orientation: Anterior;Left      Assessments 8/26/2024  1:24 PM 9/6/2024  5:33 PM   Response to Treatment Well tolerated Well tolerated   Compression Layers Multilayer Multilayer   Compression Product Type Comprilan 8cm;Comprilan 10cm;Comprilan 12cm;Coban;Stockinette 4in Comprilan 8cm;Comprilan 10cm;Comprilan 12cm;Coban;Stockinette 4in   Dressing Applied Yes Yes   Compression Wrap Location Toes to Knee Toes to Knee   Compression Wrap Status Dry;Clean Intact;Dry;Clean       No associated orders.          ASSESSMENT AND PLAN:        Risks, benefits, and alternatives of current  treatment plan discussed in detail.  Questions and concerns addressed. Red flags to RTC or ED reviewed.  Patient (or parent) agrees to plan.      No follow-ups on file.  Weekly Wound Education Note    Teaching Provided To: Patient  Training Topics: Cleasing and general instructions;Compression;Discharge instructions;Dressing;Edema control  Training Method: Explain/Verbal  Training Response: Patient responds and understands;Reinforcement needed        Notes: Here for RN visit.    Here for RN visit. Slightly improved. Wraps tolerated well. Gentamicin applied to all woundbed, betamethasone to the leg, triad paste to periwound. Hydrofera transfer, kerramax, kerlix, tape to all wounds. Comprilan 1x8, 1x10, 1x12, cellona x2. Pt started oral abx, tolerating ok - have \"tummy trouble\" day 1 but she is doing better. Encouraged to take with food and take a probiotic. Scheduled to see Rena on Tuesday.     Mila ROMANO RN   9/6/2024  5:35 PM
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
## 2024-09-08 DIAGNOSIS — S22.42XD CLOSED FRACTURE OF MULTIPLE RIBS OF LEFT SIDE WITH ROUTINE HEALING, SUBSEQUENT ENCOUNTER: ICD-10-CM
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
## 2024-09-08 DIAGNOSIS — S42.102D CLOSED FRACTURE OF LEFT SCAPULA WITH ROUTINE HEALING, UNSPECIFIED PART OF SCAPULA, SUBSEQUENT ENCOUNTER: ICD-10-CM
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
## 2024-09-10 ENCOUNTER — PATIENT MESSAGE (OUTPATIENT)
Dept: FAMILY MEDICINE CLINIC | Facility: CLINIC | Age: 49
End: 2024-09-10

## 2024-09-10 ENCOUNTER — OFFICE VISIT (OUTPATIENT)
Dept: WOUND CARE | Facility: HOSPITAL | Age: 49
End: 2024-09-10
Attending: NURSE PRACTITIONER
Payer: COMMERCIAL

## 2024-09-10 VITALS
SYSTOLIC BLOOD PRESSURE: 118 MMHG | HEART RATE: 75 BPM | TEMPERATURE: 99 F | DIASTOLIC BLOOD PRESSURE: 77 MMHG | RESPIRATION RATE: 16 BRPM

## 2024-09-10 DIAGNOSIS — S42.102D CLOSED FRACTURE OF LEFT SCAPULA WITH ROUTINE HEALING, UNSPECIFIED PART OF SCAPULA, SUBSEQUENT ENCOUNTER: ICD-10-CM
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
## 2024-09-10 DIAGNOSIS — S22.42XD CLOSED FRACTURE OF MULTIPLE RIBS OF LEFT SIDE WITH ROUTINE HEALING, SUBSEQUENT ENCOUNTER: ICD-10-CM

## 2024-09-10 DIAGNOSIS — L97.222 NON-PRESSURE CHRONIC ULCER OF LEFT CALF WITH FAT LAYER EXPOSED (HCC): Primary | ICD-10-CM

## 2024-09-10 DIAGNOSIS — M62.562 ATROPHY OF MUSCLE OF LEFT LOWER LEG: ICD-10-CM

## 2024-09-10 DIAGNOSIS — I87.303 CHRONIC VENOUS HYPERTENSION INVOLVING BOTH SIDES: ICD-10-CM

## 2024-09-10 PROCEDURE — 99215 OFFICE O/P EST HI 40 MIN: CPT | Performed by: NURSE PRACTITIONER

## 2024-09-10 RX ORDER — PREGABALIN 75 MG/1
75 CAPSULE ORAL 3 TIMES DAILY
Qty: 90 CAPSULE | Refills: 0 | Status: CANCELLED
Start: 2024-09-10

## 2024-09-10 RX ORDER — METHOCARBAMOL 750 MG/1
750 TABLET, FILM COATED ORAL 4 TIMES DAILY
Qty: 120 TABLET | Refills: 2 | Status: SHIPPED | OUTPATIENT
Start: 2024-09-10

## 2024-09-10 RX ORDER — METHOCARBAMOL 750 MG/1
750 TABLET, FILM COATED ORAL 4 TIMES DAILY
Qty: 120 TABLET | Refills: 0 | OUTPATIENT
Start: 2024-09-10

## 2024-09-10 RX ORDER — PREGABALIN 75 MG/1
75 CAPSULE ORAL 3 TIMES DAILY
Qty: 90 CAPSULE | Refills: 0 | Status: CANCELLED | OUTPATIENT
Start: 2024-09-10

## 2024-09-10 RX ORDER — METHOCARBAMOL 750 MG/1
750 TABLET, FILM COATED ORAL 4 TIMES DAILY
Qty: 120 TABLET | Refills: 0 | Status: CANCELLED
Start: 2024-09-10 | End: 2024-10-10

## 2024-09-10 RX ORDER — PREGABALIN 75 MG/1
75 CAPSULE ORAL 3 TIMES DAILY
Qty: 90 CAPSULE | Refills: 2 | Status: SHIPPED | OUTPATIENT
Start: 2024-09-10

## 2024-09-10 RX ORDER — PREGABALIN 75 MG/1
75 CAPSULE ORAL 3 TIMES DAILY
Qty: 90 CAPSULE | Refills: 0 | OUTPATIENT
Start: 2024-09-10

## 2024-09-10 NOTE — TELEPHONE ENCOUNTER
Patient requesting refill for   Methocarbamol 750 mg  LF 7/1    Lyrica  LF 8/9    LOV 8/12/24    Please approve or deny pended RX

## 2024-09-10 NOTE — TELEPHONE ENCOUNTER
Patient called to get clarification on why this was denied. She is still in need of mediation. She will be out of it by 3pm

## 2024-09-10 NOTE — TELEPHONE ENCOUNTER
From: Yasmine Robles  To: Dinorah Coleman  Sent: 9/10/2024 9:39 AM CDT  Subject: Refill Request Denied     I am wondering why the pharmacy refill request for lyrica and methocarbmol have been denied. This is helping to control my nerve pain.

## 2024-09-10 NOTE — PATIENT INSTRUCTIONS
Please return: Friday for RN visit for wound assessment, edema management, and if applicable reapplication of multilayer compression bandage system.    1 week with Rena    Patient discharge and wound care instructions  Yasmine Robles  9/10/2024     Cleansing/dressing:     In clinic with each dressing change:    please cleanse the limb, foot, and between toes with soap, water and washcloth.   Dry thoroughly.    Cleanse/soak the wound with VASHE (or other hypochlorous wound cleanser), dab dry.    Apply the following dressings:   Anterior tibial wound:  small amount gent topical>stacked transfer (use 2 full pieces)>long kerramax>30-40 pink calamine layer> 2 unfolded abd pads>brown layer>spandagrip from ankle to knee  Small open areas:  xeroform>wrap    Managing your edema:  Avoid prolonged standing in one place. It is better to have your calf muscles moving to pump fluid out of the legs      Elevate leg(s) above the level of the heart when sitting or as much as possible.  Take you diuretics as directed by your physician. Do not skip doses or change doses unless instructed to do so by your physician.  Decrease salt intake, follow recommended 2 grams daily.  Do not get leg(s) with compression wrap wet. If wraps are too tight as indicated by pain, numbness/tingling or discoloration of toes remove wrap completely and call the wound center @ [phone].  Refer to the \"Multi-layer compression bandage application patient information sheet\" given to you in clinic.     Nutrition and blood sugar control:  Focus on the following:  Protein: Meats, beans, eggs, milk and yogurt particularly Greek yogurt), tofu, soy nuts, soy protein products (Follow the protein handout in your welcome folder)  Vitamin C: Citrus fruits and juices, strawberries, tomatoes, tomato juice, peppers, baked potatoes, spinach, broccoli, cauliflower, [place] sprouts, cabbage  Vitamin A: Dark green, leafy vegetables, orange or yellow vegetables,  cantaloupe, fortified dairy products, liver, fortified cereals  Zinc: Fortified cereals, red meats, seafood  Consider supplementing with Alexys by [employer]. It can be purchased on amazon, [employer] website, or local pharmacy may be able to order it for you.  (These are essential branch chain amino acids that help with tissue building and wound healing).   When your blood sugar is consistently elevated greater than 180 your body can't heal or fight infection.     Concerns:  Signs of infection may include the following:  Increase in redness  Red \"streaks\" from wound  Increase in swelling  Fever  Unusual odor  Change in the amount of wound drainage     Should you experience any significant changes in your wound(s) or have any questions regarding your home care instructions please contact the [facility] @ [phone] If after regular business hours, please call your family doctor or local emergency room. The treatment plan has been discussed at length between you and your provider. Follow all instructions carefully, it is very important. If you do not follow all instructions you are at risk of your wound not healing, infection, possible loss of limb and even loss of life.

## 2024-09-10 NOTE — PROGRESS NOTES
.Weekly Wound Education Note    Teaching Provided To: Patient  Training Topics: Dressing;Edema control;Cleasing and general instructions;Compression;Discharge instructions  Training Method: Explain/Verbal;Written  Training Response: Patient responds and understands        Notes: Wounds stable. Dressing changed to folded xeroform to inferior medial and latera wounds. Dressing changed to gentamicin, stacked hydrofera transfers (whole pieces and beveled), kerramax, and calamine unna boot 30-40mmHg with spandagrip E from ankle to knee. stacked open ABD pads over wound inbetween pink and brown layer.

## 2024-09-11 ENCOUNTER — PATIENT MESSAGE (OUTPATIENT)
Dept: FAMILY MEDICINE CLINIC | Facility: CLINIC | Age: 49
End: 2024-09-11

## 2024-09-11 NOTE — TELEPHONE ENCOUNTER
From: Yasmine Robles  To: Zhanna Davis  Sent: 9/11/2024 9:06 AM CDT  Subject: Appt 9/16    I have follow up appointment on 9/16 for DVT diagnosis. My appointment with hematologist is 9/17. Would Dr Davis still want to see me on this day or should I reschedule? Her next appt is end of Oct    Thank you  Yasmine

## 2024-09-13 ENCOUNTER — APPOINTMENT (OUTPATIENT)
Dept: WOUND CARE | Facility: HOSPITAL | Age: 49
End: 2024-09-13
Attending: INTERNAL MEDICINE
Payer: COMMERCIAL

## 2024-09-13 VITALS
SYSTOLIC BLOOD PRESSURE: 113 MMHG | DIASTOLIC BLOOD PRESSURE: 73 MMHG | TEMPERATURE: 98 F | HEART RATE: 81 BPM | RESPIRATION RATE: 16 BRPM

## 2024-09-13 DIAGNOSIS — M62.562 ATROPHY OF MUSCLE OF LEFT LOWER LEG: ICD-10-CM

## 2024-09-13 DIAGNOSIS — T14.8XXA INFECTED WOUND: ICD-10-CM

## 2024-09-13 DIAGNOSIS — L08.9 INFECTED WOUND: ICD-10-CM

## 2024-09-13 DIAGNOSIS — L97.222 NON-PRESSURE CHRONIC ULCER OF LEFT CALF WITH FAT LAYER EXPOSED (HCC): Primary | ICD-10-CM
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
## 2024-09-13 DIAGNOSIS — I87.303 CHRONIC VENOUS HYPERTENSION INVOLVING BOTH SIDES: ICD-10-CM

## 2024-09-13 PROCEDURE — 29581 APPL MULTLAYER CMPRN SYS LEG: CPT

## 2024-09-13 NOTE — PROGRESS NOTES
Chief Complaint   Patient presents with    Wound Care     Here for RN visit. Denies pain or concerns at this time. Wrap tolerated well.           Current Outpatient Medications:     methocarbamol 750 MG Oral Tab, Take 1 tablet (750 mg total) by mouth 4 (four) times daily., Disp: 120 tablet, Rfl: 2    pregabalin 75 MG Oral Cap, Take 1 capsule (75 mg total) by mouth 3 (three) times daily., Disp: 90 capsule, Rfl: 2    gentamicin 0.1 % External Ointment, Apply 1 Application topically 3 (three) times daily., Disp: 30 g, Rfl: 3    traMADol 50 MG Oral Tab, Take 1 tablet (50 mg total) by mouth every 6 (six) hours as needed for Pain. (Patient not taking: Reported on 8/26/2024), Disp: 40 tablet, Rfl: 2    rivaroxaban (XARELTO) 20 MG Oral Tab, Take 1 tablet (20 mg total) by mouth daily with food. After done with 21 days dose, Disp: 30 tablet, Rfl: 2    acetaminophen 500 MG Oral Tab, Take 1 tablet (500 mg total) by mouth in the morning, at noon, and at bedtime. (Patient not taking: Reported on 8/12/2024), Disp: , Rfl:     ALPRAZolam 0.5 MG Oral Tab, Take 1 tablet (0.5 mg total) by mouth nightly as needed., Disp: 30 tablet, Rfl: 0    ESCITALOPRAM 20 MG Oral Tab, TAKE 1 TABLET BY MOUTH DAILY, Disp: 30 tablet, Rfl: 11    progesterone 200 MG Oral Cap, , Disp: , Rfl:     Thyroid 65 MG Oral Tab, Take 1 tablet (65 mg total) by mouth daily., Disp: , Rfl:     cholecalciferol (VITAMIN D3) 125 MCG (5000 UT) Oral Cap, Take 1 capsule (5,000 Units total) by mouth daily., Disp: , Rfl:     Multiple Vitamin (MULTI-VITAMIN) Oral Tab, Take 1 tablet by mouth daily., Disp: , Rfl:     magnesium 30 MG Oral Tab, Take 1 tablet (30 mg total) by mouth 2 (two) times daily. Unsure of dose, Disp: , Rfl:     acidophilus-pectin Oral Cap, Take 1 capsule by mouth daily. (Patient not taking: Reported on 8/12/2024), Disp: , Rfl:     Ferrous Sulfate (IRON OR), Take by mouth., Disp: , Rfl:     Omega-3 Fatty Acids (FISH OIL OR), Take by mouth., Disp: , Rfl:      levothyroxine 75 MCG Oral Tab, Take 1 tablet (75 mcg total) by mouth daily., Disp: , Rfl:     Allergies   Allergen Reactions    Pcn [Penicillins] HIVES    Keflex RASH and ITCHING          HISTORY:     Past medical, surgical, family and social history updated where appropriate.    PHYSICAL EXAM:   /73   Pulse 81   Temp 97.9 °F (36.6 °C)   Resp 16   LMP 05/02/2023 (Approximate)        Vital signs reviewed.      Calf  Point of Measurement - Left Calf: 34     Left Calf from:: Heel  Calf Left cm:: 37.5          Ankle  Point of Measurement - Left Ankle: 11     Left Ankle from:: Heel  Left Ankle cm:: 28.7                Wound 09/25/23 Incision Umbilicus (Active)   Date First Assessed/Time First Assessed: 09/25/23 0800   Primary Wound Type: Incision  Location: Umbilicus      Assessments 9/25/2023  8:04 AM 9/25/2023 10:30 AM   Closure Sutures --   Drainage Amount -- Small   Dressing 4x4s;Steri strips;Mastisol;Tape 4x4s;Steri strips;Mastisol;Tape   Dressing Status -- Intact;New Drainage       No associated orders.       Wound 08/26/24 #1 Left Medial Superior Leg Left (Active)   Date First Assessed/Time First Assessed: 08/26/24 0905    Wound Number (Wound Clinic Only): #1 Left Medial Superior  Primary Wound Type: Venous Ulcer  Location: Leg  Wound Location Orientation: Left      Assessments 8/26/2024  9:08 AM 9/13/2024  9:21 AM   Wound Image        Drainage Amount Moderate Moderate   Drainage Description Serosanguineous Serosanguineous   Treatments Compression Compression   Wound Length (cm) 17 cm 11.6 cm   Wound Width (cm) 5.6 cm 5.1 cm   Wound Surface Area (cm^2) 95.2 cm^2 59.16 cm^2   Wound Depth (cm) 0.5 cm 0.2 cm   Wound Volume (cm^3) 47.6 cm^3 11.832 cm^3   Wound Healing % -- 75   Margins Well-defined edges Well-defined edges;Epibole (Rolled edges)   Non-staged Wound Description Full thickness Full thickness   Marcella-wound Assessment Edema;Ecchymosis Edema   Wound Granulation Tissue Pink;Spongy Pink;Firm;Red    Wound Bed Granulation (%) 5 % 85 %   Wound Bed Epithelium (%) 10 % 10 %   Wound Bed Slough (%) 40 % 5 %   Wound Bed Eschar (%) 45 % --   Wound Odor None None   Shape Bridged Bridged   Tunneling? -- No   Undermining? -- No   Sinus Tracts? -- No       Inactive Orders   Date Order Priority Status Authorizing Provider   09/04/24 0910 Debridement Venous Ulcer Left Leg Routine Completed Juno Billings MD   08/26/24 1023 Debridement Venous Ulcer Left Leg Routine Completed Juno Billings MD       Wound 08/26/24 #2 Left Medial Inferior Tibial Left (Active)   Date First Assessed/Time First Assessed: 08/26/24 0905    Wound Number (Wound Clinic Only): #2 Left Medial Inferior  Primary Wound Type: Venous Ulcer  Location: Tibial  Wound Location Orientation: Left      Assessments 8/26/2024  9:10 AM 9/13/2024  9:22 AM   Wound Image        Drainage Amount Small Scant   Drainage Description Serosanguineous Serous;Yellow   Treatments Compression Compression   Wound Length (cm) 1.2 cm 0.2 cm   Wound Width (cm) 5 cm 0.2 cm   Wound Surface Area (cm^2) 6 cm^2 0.04 cm^2   Wound Depth (cm) 0.1 cm 0.1 cm   Wound Volume (cm^3) 0.6 cm^3 0.004 cm^3   Wound Healing % -- 99   Margins Well-defined edges Well-defined edges   Non-staged Wound Description Full thickness Full thickness   Marcella-wound Assessment Edema;Ecchymosis Edema   Wound Granulation Tissue Pink;Spongy Pink;Firm   Wound Bed Granulation (%) 10 % 100 %   Wound Bed Epithelium (%) 60 % --   Wound Bed Eschar (%) 30 % --   Wound Odor None None   Shape Bridged, Hypergranular --   Tunneling? -- No   Undermining? -- No   Sinus Tracts? -- No       Inactive Orders   Date Order Priority Status Authorizing Provider   08/26/24 1030 Debridement Venous Ulcer Left Tibial Routine Completed Juno Billings MD       Wound 08/26/24 #3 Left Lateral Leg Leg Left (Active)   Date First Assessed/Time First Assessed: 08/26/24 0906    Wound Number (Wound Clinic Only): #3 Left Lateral Leg   Primary Wound Type: Venous Ulcer  Location: Leg  Wound Location Orientation: Left      Assessments 8/26/2024  9:11 AM 9/13/2024  9:22 AM   Wound Image        Drainage Amount Small Scant   Drainage Description Serosanguineous Yellow;Serous   Treatments Compression Compression   Wound Length (cm) 12.8 cm 0.1 cm   Wound Width (cm) 1.4 cm 0.1 cm   Wound Surface Area (cm^2) 17.92 cm^2 0.01 cm^2   Wound Depth (cm) 0.1 cm 0.1 cm   Wound Volume (cm^3) 1.792 cm^3 0.001 cm^3   Wound Healing % -- 100   Margins Well-defined edges Well-defined edges   Non-staged Wound Description Full thickness Full thickness   Marcella-wound Assessment Edema;Ecchymosis Edema   Wound Granulation Tissue -- Pink;Firm   Wound Bed Granulation (%) -- 100 %   Wound Bed Epithelium (%) 90 % --   Wound Bed Eschar (%) 10 % --   Wound Odor None None   Shape Bridged --   Tunneling? -- No   Undermining? -- No   Sinus Tracts? -- No       Inactive Orders   Date Order Priority Status Authorizing Provider   08/26/24 1030 Debridement Venous Ulcer Left Leg Routine Completed Juno Billings MD       Compression Wrap 08/26/24 Leg Anterior;Left (Active)   Placement Date/Time: 08/26/24 1324   Location: Leg  Wound Location Orientation: Anterior;Left      Assessments 8/26/2024  1:24 PM 9/13/2024  9:49 AM   Response to Treatment Well tolerated Well tolerated   Compression Layers Multilayer Multilayer   Compression Product Type Comprilan 8cm;Comprilan 10cm;Comprilan 12cm;Coban;Stockinette 4in Unna Boot;Tubigrip/Spanda   Dressing Applied Yes Yes   Compression Wrap Location Toes to Knee Toes to Knee   Compression Wrap Status Dry;Clean Intact;Dry;Clean       No associated orders.          ASSESSMENT AND PLAN:        Risks, benefits, and alternatives of current treatment plan discussed in detail.  Questions and concerns addressed. Red flags to RTC or ED reviewed.  Patient (or parent) agrees to plan.      No follow-ups on file.  Weekly Wound Education Note    Teaching  Provided To: Patient  Training Topics: Cleasing and general instructions;Compression;Discharge instructions;Dressing;Edema control  Training Method: Explain/Verbal  Training Response: Patient responds and understands;Reinforcement needed        Notes: Here for RN visit.    Here for RN visit, wounds improving. Edema measurements stable, unna boot 30-40mmhg/spandagrip E tolerated well. Vashe soak prior to dressing application. Left medial superior leg: gentamicin, hydrofera transfer (2 full pieces stacked), kerramax, kerlix, tape. Left lateral and medial inferior leg: xeroform. Continue 30-40mmhg compression wrap, spandagrip E. Scheduled on 9/17/24 for provider visit with Rena WARREN.     Mila ROMANO RN   9/13/2024  9:53 AM
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
## 2024-09-17 ENCOUNTER — OFFICE VISIT (OUTPATIENT)
Dept: WOUND CARE | Facility: HOSPITAL | Age: 49
End: 2024-09-17
Attending: NURSE PRACTITIONER
Payer: COMMERCIAL

## 2024-09-17 ENCOUNTER — OFFICE VISIT (OUTPATIENT)
Dept: HEMATOLOGY/ONCOLOGY | Facility: HOSPITAL | Age: 49
End: 2024-09-17
Attending: INTERNAL MEDICINE
Payer: COMMERCIAL

## 2024-09-17 VITALS
HEART RATE: 87 BPM | DIASTOLIC BLOOD PRESSURE: 80 MMHG | RESPIRATION RATE: 14 BRPM | TEMPERATURE: 98 F | SYSTOLIC BLOOD PRESSURE: 121 MMHG

## 2024-09-17 VITALS
DIASTOLIC BLOOD PRESSURE: 82 MMHG | WEIGHT: 215.63 LBS | HEART RATE: 77 BPM | HEIGHT: 62.99 IN | RESPIRATION RATE: 16 BRPM | SYSTOLIC BLOOD PRESSURE: 125 MMHG | TEMPERATURE: 98 F | BODY MASS INDEX: 38.21 KG/M2 | OXYGEN SATURATION: 95 %

## 2024-09-17 DIAGNOSIS — L97.222 NON-PRESSURE CHRONIC ULCER OF LEFT CALF WITH FAT LAYER EXPOSED (HCC): Primary | ICD-10-CM

## 2024-09-17 DIAGNOSIS — I82.441 ACUTE DEEP VEIN THROMBOSIS (DVT) OF TIBIAL VEIN OF RIGHT LOWER EXTREMITY (HCC): Primary | ICD-10-CM
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
## 2024-09-17 DIAGNOSIS — M62.562 ATROPHY OF MUSCLE OF LEFT LOWER LEG: ICD-10-CM

## 2024-09-17 DIAGNOSIS — R91.1 LUNG NODULE: ICD-10-CM

## 2024-09-17 DIAGNOSIS — I87.303 CHRONIC VENOUS HYPERTENSION INVOLVING BOTH SIDES: ICD-10-CM

## 2024-09-17 PROCEDURE — 99213 OFFICE O/P EST LOW 20 MIN: CPT | Performed by: NURSE PRACTITIONER

## 2024-09-17 PROCEDURE — 99205 OFFICE O/P NEW HI 60 MIN: CPT | Performed by: INTERNAL MEDICINE

## 2024-09-17 PROCEDURE — G2211 COMPLEX E/M VISIT ADD ON: HCPCS | Performed by: INTERNAL MEDICINE

## 2024-09-17 NOTE — PROGRESS NOTES
.Weekly Wound Education Note    Teaching Provided To: Patient  Training Topics: Dressing;Edema control;Cleasing and general instructions;Compression;Discharge instructions  Training Method: Explain/Verbal;Written  Training Response: Patient responds and understands        Notes: Wound stable. Dressing changed to clobetasol to leg and periwound, sorbact, kerramax, and calamine unna boot 30-40mmHg with spandagrip E from toes to knee. Folded ABD pad in between the pink and brown layer.

## 2024-09-17 NOTE — PATIENT INSTRUCTIONS
Please return: Friday & Tuesday for RN visit for wound assessment, edema management, and if applicable reapplication of multilayer compression bandage system.    Next Friday as previously scheduled with Dr. PETERSON    Patient discharge and wound care instructions  Yasmine Robles  9/17/2024       Cleansing/dressing:     In clinic with each dressing change:    please cleanse the limb, foot, and between toes with soap, water and washcloth.   Dry thoroughly.    Cleanse/soak the wound with VASHE (or other hypochlorous wound cleanser), dab dry.    Apply the following dressings:   Anterior tibial wound:  clobetesol everywhere>sorbact>long kerramax>30-40 pink calamine layer> 2 unfolded abd pads>brown layer>spandagrip from base of toes to knee  Small open areas:  xeroform>wrap    Managing your edema:  Avoid prolonged standing in one place. It is better to have your calf muscles moving to pump fluid out of the legs      Elevate leg(s) above the level of the heart when sitting or as much as possible.  Take you diuretics as directed by your physician. Do not skip doses or change doses unless instructed to do so by your physician.  Decrease salt intake, follow recommended 2 grams daily.  Do not get leg(s) with compression wrap wet. If wraps are too tight as indicated by pain, numbness/tingling or discoloration of toes remove wrap completely and call the wound center @ [phone].  Refer to the \"Multi-layer compression bandage application patient information sheet\" given to you in clinic.     Nutrition and blood sugar control:  Focus on the following:  Protein: Meats, beans, eggs, milk and yogurt particularly Greek yogurt), tofu, soy nuts, soy protein products (Follow the protein handout in your welcome folder)  Vitamin C: Citrus fruits and juices, strawberries, tomatoes, tomato juice, peppers, baked potatoes, spinach, broccoli, cauliflower, [place] sprouts, cabbage  Vitamin A: Dark green, leafy vegetables, orange or yellow  vegetables, cantaloupe, fortified dairy products, liver, fortified cereals  Zinc: Fortified cereals, red meats, seafood  Consider supplementing with Alexys by [employer]. It can be purchased on amazon, [employer] website, or local pharmacy may be able to order it for you.  (These are essential branch chain amino acids that help with tissue building and wound healing).   When your blood sugar is consistently elevated greater than 180 your body can't heal or fight infection.     Concerns:  Signs of infection may include the following:  Increase in redness  Red \"streaks\" from wound  Increase in swelling  Fever  Unusual odor  Change in the amount of wound drainage     Should you experience any significant changes in your wound(s) or have any questions regarding your home care instructions please contact the [facility] center [facility] @ [phone] If after regular business hours, please call your family doctor or local emergency room. The treatment plan has been discussed at length between you and your provider. Follow all instructions carefully, it is very important. If you do not follow all instructions you are at risk of your wound not healing, infection, possible loss of limb and even loss of life.
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
## 2024-09-20 ENCOUNTER — APPOINTMENT (OUTPATIENT)
Dept: WOUND CARE | Facility: HOSPITAL | Age: 49
End: 2024-09-20
Attending: INTERNAL MEDICINE
Payer: COMMERCIAL

## 2024-09-20 DIAGNOSIS — L97.222 NON-PRESSURE CHRONIC ULCER OF LEFT CALF WITH FAT LAYER EXPOSED (HCC): Primary | ICD-10-CM

## 2024-09-20 PROCEDURE — 29581 APPL MULTLAYER CMPRN SYS LEG: CPT

## 2024-09-20 NOTE — PROGRESS NOTES
Chief Complaint   Patient presents with    Wound Care     Patient arrives for a wound care follow up visit. Patient arrives with an unna wrap to the left leg: patient states she has no pain in the wounds. There is clobetasol, sorbact, and kerrmax to the wound.            Current Outpatient Medications:     methocarbamol 750 MG Oral Tab, Take 1 tablet (750 mg total) by mouth 4 (four) times daily., Disp: 120 tablet, Rfl: 2    pregabalin 75 MG Oral Cap, Take 1 capsule (75 mg total) by mouth 3 (three) times daily., Disp: 90 capsule, Rfl: 2    gentamicin 0.1 % External Ointment, Apply 1 Application topically 3 (three) times daily., Disp: 30 g, Rfl: 3    rivaroxaban (XARELTO) 20 MG Oral Tab, Take 1 tablet (20 mg total) by mouth daily with food. After done with 21 days dose, Disp: 30 tablet, Rfl: 2    ALPRAZolam 0.5 MG Oral Tab, Take 1 tablet (0.5 mg total) by mouth nightly as needed., Disp: 30 tablet, Rfl: 0    ESCITALOPRAM 20 MG Oral Tab, TAKE 1 TABLET BY MOUTH DAILY, Disp: 30 tablet, Rfl: 11    progesterone 200 MG Oral Cap, , Disp: , Rfl:     Thyroid 65 MG Oral Tab, Take 1 tablet (65 mg total) by mouth daily., Disp: , Rfl:     cholecalciferol (VITAMIN D3) 125 MCG (5000 UT) Oral Cap, Take 1 capsule (5,000 Units total) by mouth daily., Disp: , Rfl:     Multiple Vitamin (MULTI-VITAMIN) Oral Tab, Take 1 tablet by mouth daily., Disp: , Rfl:     magnesium 30 MG Oral Tab, Take 1 tablet (30 mg total) by mouth 2 (two) times daily. Unsure of dose, Disp: , Rfl:     acidophilus-pectin Oral Cap, Take 1 capsule by mouth daily. (Patient not taking: Reported on 8/12/2024), Disp: , Rfl:     Ferrous Sulfate (IRON OR), Take by mouth., Disp: , Rfl:     Omega-3 Fatty Acids (FISH OIL OR), Take by mouth., Disp: , Rfl:     levothyroxine 75 MCG Oral Tab, Take 1 tablet (75 mcg total) by mouth daily., Disp: , Rfl:     Allergies   Allergen Reactions    Pcn [Penicillins] HIVES    Keflex RASH and ITCHING          HISTORY:     Past medical, surgical,  family and social history updated where appropriate.    PHYSICAL EXAM:   LMP 05/02/2023 (Approximate)        Vital signs reviewed.      Calf  Point of Measurement - Left Calf: 34     Left Calf from:: Heel  Calf Left cm:: 38.8          Ankle  Point of Measurement - Left Ankle: 11     Left Ankle from:: Heel  Left Ankle cm:: 28.5                Wound 09/25/23 Incision Umbilicus (Active)   Date First Assessed/Time First Assessed: 09/25/23 0800   Primary Wound Type: Incision  Location: Umbilicus      Assessments 9/25/2023  8:04 AM 9/25/2023 10:30 AM   Closure Sutures --   Drainage Amount -- Small   Dressing 4x4s;Steri strips;Mastisol;Tape 4x4s;Steri strips;Mastisol;Tape   Dressing Status -- Intact;New Drainage       No associated orders.       Wound 08/26/24 #1 Left Medial Superior Leg Left (Active)   Date First Assessed/Time First Assessed: 08/26/24 0905    Wound Number (Wound Clinic Only): #1 Left Medial Superior  Primary Wound Type: Venous Ulcer  Location: Leg  Wound Location Orientation: Left      Assessments 8/26/2024  9:08 AM 9/20/2024  9:20 AM   Wound Image        Drainage Amount Moderate Moderate   Drainage Description Serosanguineous Serosanguineous   Treatments Compression Compression   Wound Length (cm) 17 cm 11.1 cm   Wound Width (cm) 5.6 cm 4.5 cm   Wound Surface Area (cm^2) 95.2 cm^2 49.95 cm^2   Wound Depth (cm) 0.5 cm 0.2 cm   Wound Volume (cm^3) 47.6 cm^3 9.99 cm^3   Wound Healing % -- 79   Margins Well-defined edges Well-defined edges   Non-staged Wound Description Full thickness Full thickness   Marcella-wound Assessment Edema;Ecchymosis Edema;Moist;Pink   Wound Granulation Tissue Pink;Spongy Pink;Firm   Wound Bed Granulation (%) 5 % 70 %   Wound Bed Epithelium (%) 10 % 20 %   Wound Bed Slough (%) 40 % 10 %   Wound Bed Eschar (%) 45 % --   Wound Odor None None   Shape Bridged Bridged       Inactive Orders   Date Order Priority Status Authorizing Provider   09/04/24 0910 Debridement Venous Ulcer Left Leg  Routine Completed Juno Billings MD   08/26/24 1023 Debridement Venous Ulcer Left Leg Routine Completed Juno Billings MD       Wound 08/26/24 #2 Left Medial Inferior Tibial Left (Active)   Date First Assessed/Time First Assessed: 08/26/24 0905    Wound Number (Wound Clinic Only): #2 Left Medial Inferior  Primary Wound Type: Venous Ulcer  Location: Tibial  Wound Location Orientation: Left      Assessments 8/26/2024  9:10 AM 9/20/2024 10:13 AM   Wound Image        Drainage Amount Small None   Drainage Description Serosanguineous --   Treatments Compression --   Wound Length (cm) 1.2 cm 0 cm   Wound Width (cm) 5 cm 0 cm   Wound Surface Area (cm^2) 6 cm^2 0 cm^2   Wound Depth (cm) 0.1 cm 0 cm   Wound Volume (cm^3) 0.6 cm^3 0 cm^3   Wound Healing % -- 100   Margins Well-defined edges Flat and Intact   Non-staged Wound Description Full thickness Full thickness   Marcella-wound Assessment Edema;Ecchymosis Edema   Wound Granulation Tissue Pink;Spongy --   Wound Bed Granulation (%) 10 % --   Wound Bed Epithelium (%) 60 % 100 %   Wound Bed Eschar (%) 30 % --   Wound Odor None None   Shape Bridged, Hypergranular --       Inactive Orders   Date Order Priority Status Authorizing Provider   08/26/24 1030 Debridement Venous Ulcer Left Tibial Routine Completed Juno Billings MD       Wound 08/26/24 #3 Left Lateral Leg Leg Left (Active)   Date First Assessed/Time First Assessed: 08/26/24 0906    Wound Number (Wound Clinic Only): #3 Left Lateral Leg  Primary Wound Type: Venous Ulcer  Location: Leg  Wound Location Orientation: Left      Assessments 8/26/2024  9:11 AM 9/20/2024 10:14 AM   Wound Image         Drainage Amount Small None   Drainage Description Serosanguineous --   Treatments Compression --   Wound Length (cm) 12.8 cm 0 cm   Wound Width (cm) 1.4 cm 0 cm   Wound Surface Area (cm^2) 17.92 cm^2 0 cm^2   Wound Depth (cm) 0.1 cm 0 cm   Wound Volume (cm^3) 1.792 cm^3 0 cm^3   Wound Healing % -- 100   Margins  Well-defined edges Flat and Intact   Non-staged Wound Description Full thickness Full thickness   Marcella-wound Assessment Edema;Ecchymosis Edema   Wound Bed Epithelium (%) 90 % 100 %   Wound Bed Eschar (%) 10 % --   Wound Odor None None   Shape Bridged --       Inactive Orders   Date Order Priority Status Authorizing Provider   08/26/24 1030 Debridement Venous Ulcer Left Leg Routine Completed Juno Billings MD       Compression Wrap 08/26/24 Leg Anterior;Left (Active)   Placement Date/Time: 08/26/24 1324   Location: Leg  Wound Location Orientation: Anterior;Left      Assessments 8/26/2024  1:24 PM 9/20/2024 10:14 AM   Response to Treatment Well tolerated Well tolerated   Compression Layers Multilayer Multilayer   Compression Product Type Comprilan 8cm;Comprilan 10cm;Comprilan 12cm;Coban;Stockinette 4in Unna Boot;Tubigrip/Spanda   Dressing Applied Yes Yes   Compression Wrap Location Toes to Knee Toes to Knee   Compression Wrap Status Dry;Clean Intact;Dry;Clean       No associated orders.          ASSESSMENT AND PLAN:        Risks, benefits, and alternatives of current treatment plan discussed in detail.  Questions and concerns addressed. Red flags to RTC or ED reviewed.  Patient (or parent) agrees to plan.      No follow-ups on file.  Weekly Wound Education Note    Teaching Provided To: Patient  Training Topics: Dressing;Edema control;Cleasing and general instructions;Compression;Discharge instructions  Training Method: Explain/Verbal;Written  Training Response: Patient responds and understands        Notes: Wounds improving. Edema stable with compression change from last visit. Continue clobetasol everywhere, sorbact, kerramax over open wound. Xeroform to healed areas. Continue calamine unna boot 30-40mmHg with spandagrip E. Folded ABD pad between layers over wound area.               Marita HILLS RN   9/20/2024  11:37 AM

## 2024-09-24 ENCOUNTER — APPOINTMENT (OUTPATIENT)
Dept: WOUND CARE | Facility: HOSPITAL | Age: 49
End: 2024-09-24
Attending: INTERNAL MEDICINE
Payer: COMMERCIAL

## 2024-09-24 VITALS
SYSTOLIC BLOOD PRESSURE: 124 MMHG | DIASTOLIC BLOOD PRESSURE: 77 MMHG | HEART RATE: 82 BPM | TEMPERATURE: 98 F | RESPIRATION RATE: 14 BRPM

## 2024-09-24 DIAGNOSIS — L97.222 NON-PRESSURE CHRONIC ULCER OF LEFT CALF WITH FAT LAYER EXPOSED (HCC): Primary | ICD-10-CM

## 2024-09-24 PROCEDURE — 29581 APPL MULTLAYER CMPRN SYS LEG: CPT

## 2024-09-24 NOTE — PROGRESS NOTES
Chief Complaint   Patient presents with    Nurse Visit     Pt here for Nurse visit arrives with walking boot and spanda and unna to left leg.            Current Outpatient Medications:     methocarbamol 750 MG Oral Tab, Take 1 tablet (750 mg total) by mouth 4 (four) times daily., Disp: 120 tablet, Rfl: 2    pregabalin 75 MG Oral Cap, Take 1 capsule (75 mg total) by mouth 3 (three) times daily., Disp: 90 capsule, Rfl: 2    gentamicin 0.1 % External Ointment, Apply 1 Application topically 3 (three) times daily., Disp: 30 g, Rfl: 3    rivaroxaban (XARELTO) 20 MG Oral Tab, Take 1 tablet (20 mg total) by mouth daily with food. After done with 21 days dose, Disp: 30 tablet, Rfl: 2    ALPRAZolam 0.5 MG Oral Tab, Take 1 tablet (0.5 mg total) by mouth nightly as needed., Disp: 30 tablet, Rfl: 0    ESCITALOPRAM 20 MG Oral Tab, TAKE 1 TABLET BY MOUTH DAILY, Disp: 30 tablet, Rfl: 11    progesterone 200 MG Oral Cap, , Disp: , Rfl:     Thyroid 65 MG Oral Tab, Take 1 tablet (65 mg total) by mouth daily., Disp: , Rfl:     cholecalciferol (VITAMIN D3) 125 MCG (5000 UT) Oral Cap, Take 1 capsule (5,000 Units total) by mouth daily., Disp: , Rfl:     Multiple Vitamin (MULTI-VITAMIN) Oral Tab, Take 1 tablet by mouth daily., Disp: , Rfl:     magnesium 30 MG Oral Tab, Take 1 tablet (30 mg total) by mouth 2 (two) times daily. Unsure of dose, Disp: , Rfl:     acidophilus-pectin Oral Cap, Take 1 capsule by mouth daily. (Patient not taking: Reported on 8/12/2024), Disp: , Rfl:     Ferrous Sulfate (IRON OR), Take by mouth., Disp: , Rfl:     Omega-3 Fatty Acids (FISH OIL OR), Take by mouth., Disp: , Rfl:     levothyroxine 75 MCG Oral Tab, Take 1 tablet (75 mcg total) by mouth daily., Disp: , Rfl:     Allergies   Allergen Reactions    Pcn [Penicillins] HIVES    Keflex RASH and ITCHING          HISTORY:     Past medical, surgical, family and social history updated where appropriate.    PHYSICAL EXAM:   /77   Pulse 82   Temp 98.1 °F (36.7  °C)   Resp 14   LMP 05/02/2023 (Approximate)        Vital signs reviewed.      Calf  Point of Measurement - Left Calf: 34     Left Calf from:: Heel  Calf Left cm:: 38.2          Ankle  Point of Measurement - Left Ankle: 11     Left Ankle from:: Heel  Left Ankle cm:: 27.4                Wound 09/25/23 Incision Umbilicus (Active)   Date First Assessed/Time First Assessed: 09/25/23 0800   Primary Wound Type: Incision  Location: Umbilicus      Assessments 9/25/2023  8:04 AM 9/25/2023 10:30 AM   Closure Sutures --   Drainage Amount -- Small   Dressing 4x4s;Steri strips;Mastisol;Tape 4x4s;Steri strips;Mastisol;Tape   Dressing Status -- Intact;New Drainage       No associated orders.       Wound 08/26/24 #1 Left Medial Superior Leg Left (Active)   Date First Assessed/Time First Assessed: 08/26/24 0905    Wound Number (Wound Clinic Only): #1 Left Medial Superior  Primary Wound Type: Venous Ulcer  Location: Leg  Wound Location Orientation: Left      Assessments 8/26/2024  9:08 AM 9/24/2024 10:41 AM   Wound Image        Drainage Amount Moderate Large   Drainage Description Serosanguineous Serosanguineous   Treatments Compression --   Wound Length (cm) 17 cm 11 cm   Wound Width (cm) 5.6 cm 3.5 cm   Wound Surface Area (cm^2) 95.2 cm^2 38.5 cm^2   Wound Depth (cm) 0.5 cm 0.1 cm   Wound Volume (cm^3) 47.6 cm^3 3.85 cm^3   Wound Healing % -- 92   Margins Well-defined edges Well-defined edges   Non-staged Wound Description Full thickness Full thickness   Marcella-wound Assessment Edema;Ecchymosis Edema;Moist;Pink;Blanchable erythema   Wound Granulation Tissue Pink;Spongy Pink;Spongy   Wound Bed Granulation (%) 5 % 80 %   Wound Bed Epithelium (%) 10 % 20 %   Wound Bed Slough (%) 40 % --   Wound Bed Eschar (%) 45 % --   Wound Odor None None   Shape Bridged Bridged   Tunneling? -- No   Undermining? -- No   Sinus Tracts? -- No       Inactive Orders   Date Order Priority Status Authorizing Provider   09/04/24 0910 Debridement Venous Ulcer  Left Leg Routine Completed Juno Billings MD   08/26/24 1023 Debridement Venous Ulcer Left Leg Routine Completed Juno Billings MD       Wound 08/26/24 #2 Left Medial Inferior Tibial Left (Active)   Date First Assessed/Time First Assessed: 08/26/24 0905    Wound Number (Wound Clinic Only): #2 Left Medial Inferior  Primary Wound Type: Venous Ulcer  Location: Tibial  Wound Location Orientation: Left      Assessments 8/26/2024  9:10 AM 9/24/2024 10:46 AM   Wound Image        Drainage Amount Small None   Drainage Description Serosanguineous --   Treatments Compression --   Wound Length (cm) 1.2 cm 0 cm   Wound Width (cm) 5 cm 0 cm   Wound Surface Area (cm^2) 6 cm^2 0 cm^2   Wound Depth (cm) 0.1 cm 0 cm   Wound Volume (cm^3) 0.6 cm^3 0 cm^3   Wound Healing % -- 100   Margins Well-defined edges Attached edges   Non-staged Wound Description Full thickness Full thickness   Marcella-wound Assessment Edema;Ecchymosis Edema   Wound Granulation Tissue Pink;Spongy --   Wound Bed Granulation (%) 10 % --   Wound Bed Epithelium (%) 60 % 100 %   Wound Bed Eschar (%) 30 % --   Wound Odor None None   Shape Bridged, Hypergranular Bridged   Tunneling? -- No   Undermining? -- No   Sinus Tracts? -- No       Inactive Orders   Date Order Priority Status Authorizing Provider   08/26/24 1030 Debridement Venous Ulcer Left Tibial Routine Completed Juno Billings MD       Wound 08/26/24 #3 Left Lateral Leg Leg Left (Active)   Date First Assessed/Time First Assessed: 08/26/24 0906    Wound Number (Wound Clinic Only): #3 Left Lateral Leg  Primary Wound Type: Venous Ulcer  Location: Leg  Wound Location Orientation: Left      Assessments 8/26/2024  9:11 AM 9/24/2024 10:47 AM   Wound Image         Drainage Amount Small None   Drainage Description Serosanguineous --   Treatments Compression --   Wound Length (cm) 12.8 cm 0 cm   Wound Width (cm) 1.4 cm 0 cm   Wound Surface Area (cm^2) 17.92 cm^2 0 cm^2   Wound Depth (cm) 0.1 cm 0 cm    Wound Volume (cm^3) 1.792 cm^3 0 cm^3   Wound Healing % -- 100   Margins Well-defined edges Attached edges   Non-staged Wound Description Full thickness Full thickness   Marcella-wound Assessment Edema;Ecchymosis Edema   Wound Bed Epithelium (%) 90 % 100 %   Wound Bed Eschar (%) 10 % --   Wound Odor None None   Shape Bridged Bridged   Tunneling? -- No   Undermining? -- No   Sinus Tracts? -- No       Inactive Orders   Date Order Priority Status Authorizing Provider   08/26/24 1030 Debridement Venous Ulcer Left Leg Routine Completed Juno Billings MD       Compression Wrap 08/26/24 Leg Anterior;Left (Active)   Placement Date/Time: 08/26/24 1324   Location: Leg  Wound Location Orientation: Anterior;Left      Assessments 8/26/2024  1:24 PM 9/24/2024 10:55 AM   Response to Treatment Well tolerated Well tolerated   Compression Layers Multilayer Multilayer   Compression Product Type Comprilan 8cm;Comprilan 10cm;Comprilan 12cm;Coban;Stockinette 4in Unna Boot;Tubigrip/Spanda   Dressing Applied Yes Yes   Compression Wrap Location Toes to Knee Toes to Knee   Compression Wrap Status Dry;Clean Intact;Dry;Clean       No associated orders.          ASSESSMENT AND PLAN:        Risks, benefits, and alternatives of current treatment plan discussed in detail.  Questions and concerns addressed. Red flags to RTC or ED reviewed.  Patient (or parent) agrees to plan.      No follow-ups on file.  Weekly Wound Education Note    Teaching Provided To: Patient  Training Topics: Edema control;Compression;Cleasing and general instructions;Discharge instructions  Training Method: Demonstration;Explain/Verbal;Written           Notes: Pt tolerating compression wrap with no issues.  Heel appears moist no xeroform used today. Continue clobatesol, sorbact, keramax, Calamine unna 30-40 with E spanda. Reviewed compression wrap handout information with patient.               Bernadette HURST RN   9/24/2024  10:56 AM

## 2024-09-24 NOTE — PROGRESS NOTES
Weekly Wound Education Note    Teaching Provided To: Patient  Training Topics: Edema control;Compression;Cleasing and general instructions;Discharge instructions  Training Method: Demonstration;Explain/Verbal;Written           Notes: Pt tolerating compression wrap with no issues.  Heel appears moist no xeroform used today. Continue clobatesol, sorbact, keramax, Calamine unna 30-40 with E jessyda. Reviewed compression wrap handout information with patient.

## 2024-09-25 ENCOUNTER — APPOINTMENT (OUTPATIENT)
Dept: HEMATOLOGY/ONCOLOGY | Facility: HOSPITAL | Age: 49
End: 2024-09-25
Attending: INTERNAL MEDICINE
Payer: COMMERCIAL

## 2024-09-27 ENCOUNTER — APPOINTMENT (OUTPATIENT)
Dept: WOUND CARE | Facility: HOSPITAL | Age: 49
End: 2024-09-27
Attending: INTERNAL MEDICINE
Payer: COMMERCIAL

## 2024-09-27 VITALS
RESPIRATION RATE: 16 BRPM | SYSTOLIC BLOOD PRESSURE: 135 MMHG | DIASTOLIC BLOOD PRESSURE: 89 MMHG | TEMPERATURE: 99 F | HEART RATE: 75 BPM

## 2024-09-27 DIAGNOSIS — M62.562 ATROPHY OF MUSCLE OF LEFT LOWER LEG: ICD-10-CM
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
## 2024-09-27 DIAGNOSIS — L97.222 NON-PRESSURE CHRONIC ULCER OF LEFT CALF WITH FAT LAYER EXPOSED (HCC): Primary | ICD-10-CM

## 2024-09-27 DIAGNOSIS — I87.303 CHRONIC VENOUS HYPERTENSION INVOLVING BOTH SIDES: ICD-10-CM

## 2024-09-27 PROCEDURE — 97598 DBRDMT OPN WND ADDL 20CM/<: CPT | Performed by: INTERNAL MEDICINE

## 2024-09-27 PROCEDURE — 97597 DBRDMT OPN WND 1ST 20 CM/<: CPT | Performed by: INTERNAL MEDICINE
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
## 2024-09-27 NOTE — PROGRESS NOTES
Patient ID: Yasmine Robles is a 48 year old female.    Debridement Venous Ulcer Left Leg   Wound 08/26/24 #1 Left Medial Superior Leg Left    Performed by: Juno Billings MD  Authorized by: Juno Billings MD      Consent   Consent obtained? verbal  Consent given by: patient  Risks discussed? procedural risks discussed    Debridement Details  Performed by: physician  Debridement type: conservative sharp  Pain control: lidocaine 4%  Pain control administration type: topical    Pre-debridement measurements  Length (cm): 10.9  Width (cm): 4  Depth (cm): 0.1  Surface Area (cm^2): 43.6    Post-debridement measurements  Length (cm): 10.9  Width (cm): 4  Depth (cm): 0.1  Percent debrided: 80%  Surface Area (cm^2): 43.6  Area Debrided (cm^2): 34.88  Volume (cm^3): 4.36    Devitalized tissue debrided: biofilm  Instrument(s) utilized: curette  Bleeding: medium  Hemostasis obtained with: pressure  Procedural pain (0-10): 1  Post-procedural pain: 0   Response to treatment: procedure was tolerated well

## 2024-09-27 NOTE — PATIENT INSTRUCTIONS
Dressing changes weekly  Return next Wednesday to see me  Low salt diet  Leg elevation  Watch out for s/o infection    Wound Cleaning and Dressings:    Shower with protection  Use shower boot  DRESSINGS: endoform / HF transfer /  kerramax  Zinc barrier cream periwound  Change dressing in clinic only    Compression Therapy : UNNA 30-40 mm hg  Compression Therapy Instructions:  1.  Put on first thing in the morning and may remove at bedside.  Okay to wear overnight      if comfortable.  Do not let stockings roll up/down and kink.  Hand wash stockings and      hang dry as needed.    2.  Avoid prolonged standing in one place.  It is better to have your calf muscles moving       to pump fluid out of the legs.    3.  Elevate leg(s) above the level of the heart when sitting or as much as possible.    4.  Take your diuretics as directed by your provider.  Do not skip doses or change doses      unless instructed to do so by your provider.    5. Do not get leg(s) with compression wrap wet. If wraps are too tight as indicated        By pain, numbness/tingling or discoloration of toes remove wrap completely       and call the   wound center.     Miscellaneous Instructions:  Supplement with a daily multivitamin   Low salt diet  Intense blood sugar control - Goal Blood sugar below 180 at all times recommended.  Increase protein intake / consider protein supplements - see below  Elevate extremities at all times when sitting / laying down.    DIETARY MODIFICATIONS TO HELP WITH WOUND HEALING:    Protein: Meats, beans, eggs, milk and yogurt particularly Greek yogurt), tofu, soy nuts, soy protein products    Vitamin C: Citrus fruits and juices, strawberries, tomatoes, tomato juice, peppers, baked potatoes, spinach, broccoli, cauliflower, [place] sprouts, cabbage    Vitamin A: Dark green, leafy vegetables, orange or yellow vegetables, cantaloupe, fortified dairy products, liver, fortified cereals    Zinc: Fortified cereals, red  meats, seafood    Consider Alexys by [employer] (These are essential branch chain amino acids that help with tissue building and wound healing) and take 2 packets/day. you can order online at [employer] or [employer]    ADDITIONAL REMINDERS:    The treatment plan has been discussed at length with you and your provider. Follow all instructions carefully, it is very important. If you do not follow all instructions, you are at  risk of your wound not healing, infection, possible loss of limb and even end of life.  Please call the clinic during regular business hours ( 7:30 AM - 5:30 PM) if you notice increased bleeding, redness, warmth, pain or pus like drainage or start running a fever greater than 100.3.    For after hour emergencies, please call your primary physician or go to the nearest emergency room.

## 2024-09-27 NOTE — PROGRESS NOTES
Weekly Wound Education Note    Teaching Provided To: Patient  Training Topics: Cleasing and general instructions;Compression;Discharge instructions;Dressing;Edema control  Training Method: Explain/Verbal  Training Response: Patient responds and understands;Reinforcement needed        Notes: Improving. Medial inferior and lateral leg wounds healed per provider. Vashe soak prior to dressing application. Clobetasol, salvador, sorbact, kerramax, unna boot 30-40mmhg/spandagrip E.

## 2024-10-02 ENCOUNTER — HOSPITAL ENCOUNTER (OUTPATIENT)
Dept: MAMMOGRAPHY | Age: 49
Discharge: HOME | End: 2024-10-02
Attending: FAMILY MEDICINE
Payer: COMMERCIAL

## 2024-10-02 ENCOUNTER — OFFICE VISIT (OUTPATIENT)
Dept: WOUND CARE | Facility: HOSPITAL | Age: 49
End: 2024-10-02
Attending: INTERNAL MEDICINE
Payer: COMMERCIAL

## 2024-10-02 VITALS
SYSTOLIC BLOOD PRESSURE: 110 MMHG | RESPIRATION RATE: 16 BRPM | HEART RATE: 83 BPM | DIASTOLIC BLOOD PRESSURE: 76 MMHG | TEMPERATURE: 98 F

## 2024-10-02 DIAGNOSIS — I87.303 CHRONIC VENOUS HYPERTENSION INVOLVING BOTH SIDES: ICD-10-CM

## 2024-10-02 DIAGNOSIS — T14.8XXD DELAYED WOUND HEALING: ICD-10-CM

## 2024-10-02 DIAGNOSIS — E66.9 OBESITY (BMI 30-39.9): ICD-10-CM
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
## 2024-10-02 DIAGNOSIS — L30.8 DERMATITIS ASSOCIATED WITH MOISTURE: ICD-10-CM

## 2024-10-02 DIAGNOSIS — Z12.31 VISIT FOR SCREENING MAMMOGRAM: ICD-10-CM

## 2024-10-02 DIAGNOSIS — T81.89XD NON-HEALING SURGICAL WOUND, SUBSEQUENT ENCOUNTER: ICD-10-CM

## 2024-10-02 DIAGNOSIS — L97.222 NON-PRESSURE CHRONIC ULCER OF LEFT CALF WITH FAT LAYER EXPOSED (HCC): Primary | ICD-10-CM

## 2024-10-02 PROCEDURE — 97597 DBRDMT OPN WND 1ST 20 CM/<: CPT | Performed by: INTERNAL MEDICINE

## 2024-10-02 PROCEDURE — 77063 BREAST TOMOSYNTHESIS BI: CPT | Performed by: FAMILY MEDICINE

## 2024-10-02 PROCEDURE — 77067 SCR MAMMO BI INCL CAD: CPT | Performed by: FAMILY MEDICINE

## 2024-10-02 NOTE — PATIENT INSTRUCTIONS
Increase compression to comprilan.   Dressing changes weekly  Return next Wednesday to see me  Low salt diet  Leg elevation  Watch out for s/o infection    Wound Cleaning and Dressings:    Shower with protection  Use shower boot  DRESSINGS: endoform / HF transfer /  kerramax  Zinc barrier cream periwound  Change dressing in clinic only    Compression Therapy : comprilan wraps  Compression Therapy Instructions:  1.  Put on first thing in the morning and may remove at bedside.  Okay to wear overnight      if comfortable.  Do not let stockings roll up/down and kink.  Hand wash stockings and      hang dry as needed.    2.  Avoid prolonged standing in one place.  It is better to have your calf muscles moving       to pump fluid out of the legs.    3.  Elevate leg(s) above the level of the heart when sitting or as much as possible.    4.  Take your diuretics as directed by your provider.  Do not skip doses or change doses      unless instructed to do so by your provider.    5. Do not get leg(s) with compression wrap wet. If wraps are too tight as indicated        By pain, numbness/tingling or discoloration of toes remove wrap completely       and call the   wound center.     Miscellaneous Instructions:  Supplement with a daily multivitamin   Low salt diet  Intense blood sugar control - Goal Blood sugar below 180 at all times recommended.  Increase protein intake / consider protein supplements - see below  Elevate extremities at all times when sitting / laying down.    DIETARY MODIFICATIONS TO HELP WITH WOUND HEALING:    Protein: Meats, beans, eggs, milk and yogurt particularly Greek yogurt), tofu, soy nuts, soy protein products    Vitamin C: Citrus fruits and juices, strawberries, tomatoes, tomato juice, peppers, baked potatoes, spinach, broccoli, cauliflower, [place] sprouts, cabbage    Vitamin A: Dark green, leafy vegetables, orange or yellow vegetables, cantaloupe, fortified dairy products, liver, fortified  cereals    Zinc: Fortified cereals, red meats, seafood    Consider Alexys by [employer] (These are essential branch chain amino acids that help with tissue building and wound healing) and take 2 packets/day. you can order online at [employer] or [employer]    ADDITIONAL REMINDERS:    The treatment plan has been discussed at length with you and your provider. Follow all instructions carefully, it is very important. If you do not follow all instructions, you are at  risk of your wound not healing, infection, possible loss of limb and even end of life.  Please call the clinic during regular business hours ( 7:30 AM - 5:30 PM) if you notice increased bleeding, redness, warmth, pain or pus like drainage or start running a fever greater than 100.3.    For after hour emergencies, please call your primary physician or go to the nearest emergency room.

## 2024-10-02 NOTE — PROGRESS NOTES
Patient ID: Yasmine Robles is a 48 year old female.    Debridement Venous Ulcer Left Leg   Wound 08/26/24 #1 Left Medial Superior Leg Left    Performed by: Juno Billings MD  Authorized by: Juno Billings MD      Consent   Consent obtained? verbal  Consent given by: patient  Risks discussed? procedural risks discussed    Debridement Details  Performed by: physician  Debridement type: conservative sharp  Pain control: lidocaine 4%  Pain control administration type: topical    Pre-debridement measurements  Length (cm): 9.5  Width (cm): 3  Depth (cm): 0.1  Surface Area (cm^2): 28.5    Post-debridement measurements  Length (cm): 9.5  Width (cm): 3  Depth (cm): 0.1  Percent debrided: 70%  Surface Area (cm^2): 28.5  Area Debrided (cm^2): 19.95  Volume (cm^3): 2.85    Tissue and other material debrided: hypergranulation  Devitalized tissue debrided: biofilm  Instrument(s) utilized: curette  Bleeding: medium  Hemostasis obtained with: pressure  Procedural pain (0-10): 1  Post-procedural pain: 0   Response to treatment: procedure was tolerated well
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
## 2024-10-02 NOTE — PROGRESS NOTES
Weekly Wound Education Note    Teaching Provided To: Patient  Training Topics: Cleasing and general instructions;Compression;Discharge instructions;Dressing;Edema control  Training Method: Explain/Verbal  Training Response: Patient responds and understands;Reinforcement needed        Notes: Improving. Clobetasol, salvador, hydrofera transfer, kerramax, conforming gauze, tape, comprilan 1x8, 1x10, 1x12, cellona x2.

## 2024-10-09 ENCOUNTER — OFFICE VISIT (OUTPATIENT)
Dept: WOUND CARE | Facility: HOSPITAL | Age: 49
End: 2024-10-09
Attending: INTERNAL MEDICINE
Payer: COMMERCIAL

## 2024-10-09 VITALS
RESPIRATION RATE: 14 BRPM | SYSTOLIC BLOOD PRESSURE: 119 MMHG | HEART RATE: 77 BPM | DIASTOLIC BLOOD PRESSURE: 77 MMHG | TEMPERATURE: 98 F

## 2024-10-09 DIAGNOSIS — M62.562 ATROPHY OF MUSCLE OF LEFT LOWER LEG: ICD-10-CM

## 2024-10-09 DIAGNOSIS — T14.8XXA INFECTED WOUND: ICD-10-CM

## 2024-10-09 DIAGNOSIS — L97.222 NON-PRESSURE CHRONIC ULCER OF LEFT CALF WITH FAT LAYER EXPOSED (HCC): Primary | ICD-10-CM

## 2024-10-09 DIAGNOSIS — E66.9 OBESITY (BMI 30-39.9): ICD-10-CM

## 2024-10-09 DIAGNOSIS — I87.303 CHRONIC VENOUS HYPERTENSION INVOLVING BOTH SIDES: ICD-10-CM

## 2024-10-09 DIAGNOSIS — L08.9 INFECTED WOUND: ICD-10-CM

## 2024-10-09 DIAGNOSIS — T81.89XD NON-HEALING SURGICAL WOUND, SUBSEQUENT ENCOUNTER: ICD-10-CM

## 2024-10-09 DIAGNOSIS — T14.8XXD DELAYED WOUND HEALING: ICD-10-CM

## 2024-10-09 DIAGNOSIS — L30.8 DERMATITIS ASSOCIATED WITH MOISTURE: ICD-10-CM
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
## 2024-10-09 PROCEDURE — 29581 APPL MULTLAYER CMPRN SYS LEG: CPT

## 2024-10-09 NOTE — PROGRESS NOTES
Weekly Wound Education Note    Teaching Provided To: Patient  Training Topics: Cleasing and general instructions;Compression;Discharge instructions;Dressing;Edema control  Training Method: Explain/Verbal  Training Response: Patient responds and understands;Reinforcement needed        Notes: Improving. Continue clobetasol to leg (rash continues to improve), salvador, hydrofera transfer, kerramax, conforming gauze, tape. Comprilan 1x8, 1x10, 1x12.
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
## 2024-10-09 NOTE — PATIENT INSTRUCTIONS
Increase compression to comprilan.   Dressing changes weekly  Return next Wednesday to see me  Low salt diet  Leg elevation  Watch out for s/o infection    Wound Cleaning and Dressings:    Shower with protection  Use shower boot  DRESSINGS: endoform / HF transfer /  kerramax  Zinc barrier cream periwound  Change dressing in clinic only    Compression Therapy : comprilan wraps  Compression Therapy Instructions:  1.  Put on first thing in the morning and may remove at bedside.  Okay to wear overnight      if comfortable.  Do not let stockings roll up/down and kink.  Hand wash stockings and      hang dry as needed.    2.  Avoid prolonged standing in one place.  It is better to have your calf muscles moving       to pump fluid out of the legs.    3.  Elevate leg(s) above the level of the heart when sitting or as much as possible.    4.  Take your diuretics as directed by your provider.  Do not skip doses or change doses      unless instructed to do so by your provider.    5. Do not get leg(s) with compression wrap wet. If wraps are too tight as indicated        By pain, numbness/tingling or discoloration of toes remove wrap completely       and call the   wound center.     Miscellaneous Instructions:  Supplement with a daily multivitamin   Low salt diet  Intense blood sugar control - Goal Blood sugar below 180 at all times recommended.  Increase protein intake / consider protein supplements - see below  Elevate extremities at all times when sitting / laying down.    DIETARY MODIFICATIONS TO HELP WITH WOUND HEALING:    Protein: Meats, beans, eggs, milk and yogurt particularly Greek yogurt), tofu, soy nuts, soy protein products    Vitamin C: Citrus fruits and juices, strawberries, tomatoes, tomato juice, peppers, baked potatoes, spinach, broccoli, cauliflower, [place] sprouts, cabbage    Vitamin A: Dark green, leafy vegetables, orange or yellow vegetables, cantaloupe, fortified dairy products, liver, fortified  cereals    Zinc: Fortified cereals, red meats, seafood    Consider Alexys by [employer] (These are essential branch chain amino acids that help with tissue building and wound healing) and take 2 packets/day. you can order online at [employer] or [employer]    ADDITIONAL REMINDERS:    The treatment plan has been discussed at length with you and your provider. Follow all instructions carefully, it is very important. If you do not follow all instructions, you are at  risk of your wound not healing, infection, possible loss of limb and even end of life.  Please call the clinic during regular business hours ( 7:30 AM - 5:30 PM) if you notice increased bleeding, redness, warmth, pain or pus like drainage or start running a fever greater than 100.3.    For after hour emergencies, please call your primary physician or go to the nearest emergency room.

## 2024-10-16 ENCOUNTER — OFFICE VISIT (OUTPATIENT)
Dept: WOUND CARE | Facility: HOSPITAL | Age: 49
End: 2024-10-16
Attending: INTERNAL MEDICINE
Payer: COMMERCIAL

## 2024-10-16 VITALS
TEMPERATURE: 99 F | SYSTOLIC BLOOD PRESSURE: 128 MMHG | HEART RATE: 73 BPM | RESPIRATION RATE: 14 BRPM | DIASTOLIC BLOOD PRESSURE: 80 MMHG

## 2024-10-16 DIAGNOSIS — T81.89XD NON-HEALING SURGICAL WOUND, SUBSEQUENT ENCOUNTER: ICD-10-CM

## 2024-10-16 DIAGNOSIS — M62.562 ATROPHY OF MUSCLE OF LEFT LOWER LEG: ICD-10-CM

## 2024-10-16 DIAGNOSIS — I87.303 CHRONIC VENOUS HYPERTENSION INVOLVING BOTH SIDES: ICD-10-CM

## 2024-10-16 DIAGNOSIS — E66.9 OBESITY (BMI 30-39.9): ICD-10-CM

## 2024-10-16 DIAGNOSIS — T14.8XXD DELAYED WOUND HEALING: ICD-10-CM

## 2024-10-16 DIAGNOSIS — L30.8 DERMATITIS ASSOCIATED WITH MOISTURE: ICD-10-CM

## 2024-10-16 DIAGNOSIS — L97.222 NON-PRESSURE CHRONIC ULCER OF LEFT CALF WITH FAT LAYER EXPOSED (HCC): Primary | ICD-10-CM
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
## 2024-10-16 PROCEDURE — 29581 APPL MULTLAYER CMPRN SYS LEG: CPT
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
## 2024-10-16 NOTE — PROGRESS NOTES
Weekly Wound Education Note    Teaching Provided To: Patient  Training Topics: Cleasing and general instructions;Compression;Discharge instructions;Dressing;Edema control  Training Method: Explain/Verbal  Training Response: Patient responds and understands;Reinforcement needed        Notes: Much improved. Kianna, hydrofera transfer, ABD pad, conforming gauze, tape, comprilan 1x8, 1x10, 1x12. Discussed compression garments, ordereding 2 layer medivan stocking from [employer].

## 2024-10-16 NOTE — PATIENT INSTRUCTIONS
Continue comprilan.   Dressing changes weekly  Return next Wednesday to see me  Low salt diet  Leg elevation  Watch out for s/o infection    Wound Cleaning and Dressings:    Shower with protection  Use shower boot  DRESSINGS: endoform / HF transfer /  kerramax  Zinc barrier cream periwound  Change dressing in clinic only    Compression Therapy : comprilan wraps  Compression Therapy Instructions:  1.  Put on first thing in the morning and may remove at bedside.  Okay to wear overnight      if comfortable.  Do not let stockings roll up/down and kink.  Hand wash stockings and      hang dry as needed.    2.  Avoid prolonged standing in one place.  It is better to have your calf muscles moving       to pump fluid out of the legs.    3.  Elevate leg(s) above the level of the heart when sitting or as much as possible.    4.  Take your diuretics as directed by your provider.  Do not skip doses or change doses      unless instructed to do so by your provider.    5. Do not get leg(s) with compression wrap wet. If wraps are too tight as indicated        By pain, numbness/tingling or discoloration of toes remove wrap completely       and call the   wound center.     Miscellaneous Instructions:  Supplement with a daily multivitamin   Low salt diet  Intense blood sugar control - Goal Blood sugar below 180 at all times recommended.  Increase protein intake / consider protein supplements - see below  Elevate extremities at all times when sitting / laying down.    DIETARY MODIFICATIONS TO HELP WITH WOUND HEALING:    Protein: Meats, beans, eggs, milk and yogurt particularly Greek yogurt), tofu, soy nuts, soy protein products    Vitamin C: Citrus fruits and juices, strawberries, tomatoes, tomato juice, peppers, baked potatoes, spinach, broccoli, cauliflower, [place] sprouts, cabbage    Vitamin A: Dark green, leafy vegetables, orange or yellow vegetables, cantaloupe, fortified dairy products, liver, fortified cereals    Zinc:  Fortified cereals, red meats, seafood    Consider Alexys by [employer] (These are essential branch chain amino acids that help with tissue building and wound healing) and take 2 packets/day. you can order online at [employer] or [employer]    ADDITIONAL REMINDERS:    The treatment plan has been discussed at length with you and your provider. Follow all instructions carefully, it is very important. If you do not follow all instructions, you are at  risk of your wound not healing, infection, possible loss of limb and even end of life.  Please call the clinic during regular business hours ( 7:30 AM - 5:30 PM) if you notice increased bleeding, redness, warmth, pain or pus like drainage or start running a fever greater than 100.3.    For after hour emergencies, please call your primary physician or go to the nearest emergency room.

## 2024-10-23 ENCOUNTER — APPOINTMENT (OUTPATIENT)
Dept: WOUND CARE | Facility: HOSPITAL | Age: 49
End: 2024-10-23
Attending: INTERNAL MEDICINE
Payer: COMMERCIAL

## 2024-10-23 VITALS
TEMPERATURE: 98 F | RESPIRATION RATE: 16 BRPM | SYSTOLIC BLOOD PRESSURE: 125 MMHG | DIASTOLIC BLOOD PRESSURE: 82 MMHG | HEART RATE: 81 BPM

## 2024-10-23 DIAGNOSIS — T81.89XD NON-HEALING SURGICAL WOUND, SUBSEQUENT ENCOUNTER: ICD-10-CM
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
## 2024-10-23 DIAGNOSIS — L97.222 NON-PRESSURE CHRONIC ULCER OF LEFT CALF WITH FAT LAYER EXPOSED (HCC): Primary | ICD-10-CM

## 2024-10-23 DIAGNOSIS — L30.8 DERMATITIS ASSOCIATED WITH MOISTURE: ICD-10-CM

## 2024-10-23 DIAGNOSIS — T14.8XXD DELAYED WOUND HEALING: ICD-10-CM

## 2024-10-23 DIAGNOSIS — I87.303 CHRONIC VENOUS HYPERTENSION INVOLVING BOTH SIDES: ICD-10-CM

## 2024-10-23 PROCEDURE — 29581 APPL MULTLAYER CMPRN SYS LEG: CPT
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
## 2024-10-23 NOTE — PROGRESS NOTES
Weekly Wound Education Note    Teaching Provided To: Patient  Training Topics: Cleasing and general instructions;Compression;Discharge instructions;Dressing;Edema control  Training Method: Explain/Verbal  Training Response: Patient responds and understands;Reinforcement needed        Notes: Improving. Continue betamethasone, salvador, hydrofera transfer, kerramax, conforming gauze, tape, comprilan 1x8, 1x10, 1x12. Patient her compression stocking was delivered, advised her to bring to her next visit. Patient brought in own supplies.

## 2024-10-23 NOTE — PATIENT INSTRUCTIONS
Continue comprilan.   Dressing changes weekly  Return next Wednesday to see me  Low salt diet  Leg elevation  Watch out for s/o infection    Wound Cleaning and Dressings:    Shower with protection  Use shower boot  DRESSINGS: endoform / HF transfer /  kerramax  Zinc barrier cream periwound  Change dressing in clinic only    Compression Therapy : comprilan wraps  Compression Therapy Instructions:  1.  Put on first thing in the morning and may remove at bedside.  Okay to wear overnight      if comfortable.  Do not let stockings roll up/down and kink.  Hand wash stockings and      hang dry as needed.    2.  Avoid prolonged standing in one place.  It is better to have your calf muscles moving       to pump fluid out of the legs.    3.  Elevate leg(s) above the level of the heart when sitting or as much as possible.    4.  Take your diuretics as directed by your provider.  Do not skip doses or change doses      unless instructed to do so by your provider.    5. Do not get leg(s) with compression wrap wet. If wraps are too tight as indicated        By pain, numbness/tingling or discoloration of toes remove wrap completely       and call the   wound center.     Miscellaneous Instructions:  Supplement with a daily multivitamin   Low salt diet  Intense blood sugar control - Goal Blood sugar below 180 at all times recommended.  Increase protein intake / consider protein supplements - see below  Elevate extremities at all times when sitting / laying down.    DIETARY MODIFICATIONS TO HELP WITH WOUND HEALING:    Protein: Meats, beans, eggs, milk and yogurt particularly Greek yogurt), tofu, soy nuts, soy protein products    Vitamin C: Citrus fruits and juices, strawberries, tomatoes, tomato juice, peppers, baked potatoes, spinach, broccoli, cauliflower, [place] sprouts, cabbage    Vitamin A: Dark green, leafy vegetables, orange or yellow vegetables, cantaloupe, fortified dairy products, liver, fortified cereals    Zinc:  Fortified cereals, red meats, seafood    Consider Alexys by [employer] (These are essential branch chain amino acids that help with tissue building and wound healing) and take 2 packets/day. you can order online at [employer] or [employer]    ADDITIONAL REMINDERS:    The treatment plan has been discussed at length with you and your provider. Follow all instructions carefully, it is very important. If you do not follow all instructions, you are at  risk of your wound not healing, infection, possible loss of limb and even end of life.  Please call the clinic during regular business hours ( 7:30 AM - 5:30 PM) if you notice increased bleeding, redness, warmth, pain or pus like drainage or start running a fever greater than 100.3.    For after hour emergencies, please call your primary physician or go to the nearest emergency room.

## 2024-10-30 ENCOUNTER — OFFICE VISIT (OUTPATIENT)
Dept: WOUND CARE | Facility: HOSPITAL | Age: 49
End: 2024-10-30
Attending: INTERNAL MEDICINE
Payer: COMMERCIAL

## 2024-10-30 VITALS
TEMPERATURE: 98 F | SYSTOLIC BLOOD PRESSURE: 134 MMHG | RESPIRATION RATE: 16 BRPM | HEART RATE: 75 BPM | DIASTOLIC BLOOD PRESSURE: 85 MMHG

## 2024-10-30 DIAGNOSIS — T81.89XD NON-HEALING SURGICAL WOUND, SUBSEQUENT ENCOUNTER: ICD-10-CM

## 2024-10-30 DIAGNOSIS — T14.8XXD DELAYED WOUND HEALING: ICD-10-CM

## 2024-10-30 DIAGNOSIS — L97.222 NON-PRESSURE CHRONIC ULCER OF LEFT CALF WITH FAT LAYER EXPOSED (HCC): Primary | ICD-10-CM

## 2024-10-30 DIAGNOSIS — I87.303 CHRONIC VENOUS HYPERTENSION INVOLVING BOTH SIDES: ICD-10-CM
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
## 2024-10-30 DIAGNOSIS — E66.9 OBESITY (BMI 30-39.9): ICD-10-CM

## 2024-10-30 PROCEDURE — 29581 APPL MULTLAYER CMPRN SYS LEG: CPT
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
## 2024-10-30 NOTE — PATIENT INSTRUCTIONS
Trial of kerecis skin substitute  Continue comprilan.   Dressing changes weekly  Return next Wednesday to see me  Low salt diet  Leg elevation  Watch out for s/o infection    Wound Cleaning and Dressings:    Shower with protection  Use shower boot  DRESSINGS: kerecis skin substitute / silver alginate /  kerramax  Zinc barrier cream periwound  Change dressing in clinic only    Compression Therapy : comprilan wraps  Compression Therapy Instructions:  1.  Put on first thing in the morning and may remove at bedside.  Okay to wear overnight      if comfortable.  Do not let stockings roll up/down and kink.  Hand wash stockings and      hang dry as needed.    2.  Avoid prolonged standing in one place.  It is better to have your calf muscles moving       to pump fluid out of the legs.    3.  Elevate leg(s) above the level of the heart when sitting or as much as possible.    4.  Take your diuretics as directed by your provider.  Do not skip doses or change doses      unless instructed to do so by your provider.    5. Do not get leg(s) with compression wrap wet. If wraps are too tight as indicated        By pain, numbness/tingling or discoloration of toes remove wrap completely       and call the   wound center.     Miscellaneous Instructions:  Supplement with a daily multivitamin   Low salt diet  Intense blood sugar control - Goal Blood sugar below 180 at all times recommended.  Increase protein intake / consider protein supplements - see below  Elevate extremities at all times when sitting / laying down.    DIETARY MODIFICATIONS TO HELP WITH WOUND HEALING:    Protein: Meats, beans, eggs, milk and yogurt particularly Greek yogurt), tofu, soy nuts, soy protein products    Vitamin C: Citrus fruits and juices, strawberries, tomatoes, tomato juice, peppers, baked potatoes, spinach, broccoli, cauliflower, [place] sprouts, cabbage    Vitamin A: Dark green, leafy vegetables, orange or yellow vegetables, cantaloupe, fortified  dairy products, liver, fortified cereals    Zinc: Fortified cereals, red meats, seafood    Consider Alexys by [employer] (These are essential branch chain amino acids that help with tissue building and wound healing) and take 2 packets/day. you can order online at [employer] or [email or web address]    ADDITIONAL REMINDERS:    The treatment plan has been discussed at length with you and your provider. Follow all instructions carefully, it is very important. If you do not follow all instructions, you are at  risk of your wound not healing, infection, possible loss of limb and even end of life.  Please call the clinic during regular business hours ( 7:30 AM - 5:30 PM) if you notice increased bleeding, redness, warmth, pain or pus like drainage or start running a fever greater than 100.3.    For after hour emergencies, please call your primary physician or go to the nearest emergency room.

## 2024-10-30 NOTE — PROGRESS NOTES
Patient ID: Yasmine Robles is a 48 year old female.    Cellular tissue product application Venous Ulcer Left Leg    Date/Time: 10/30/2024 9:44 AM    Performed by: Juno Billings MD  Authorized by: Juno Billings MD    Associated wounds:   Wound 08/26/24 #1 Left Medial Superior Leg Left  Consent:     Consent obtained:  Verbal    Consent given by:  Patient    Risks discussed:  Bleeding, infection and pain  Procedure details:     Location:  trunk/arms/legs    Product applied:  Kerecis omega3    Product lot #:  [id number]    Product expiration:  7/31/2026    Amount used (cm^2):  2    Secured/Fixated: Yes      Secured/Fixated with:  Silicone contact layer  Post-procedure details:     Patient tolerance of procedure:  Tolerated well, no immediate complications  Comments:      This product was a sample. Dressed with xeroform and kerramax.

## 2024-10-30 NOTE — PROGRESS NOTES
Weekly Wound Education Note    Teaching Provided To: Patient  Training Topics: Discharge instructions;Dressing;Cleasing and general instructions;Compression;Edema control  Training Method: Explain/Verbal  Training Response: Patient responds and understands;Reinforcement needed        Notes: Stable. Kerecis #1 sample applied, silicone contact layer, clobetasol to leg, folder xeroform, kerramax, conforming gauze, tape, comprilan 1x8, 1x10, 1x12.

## 2024-11-06 ENCOUNTER — OFFICE VISIT (OUTPATIENT)
Dept: WOUND CARE | Facility: HOSPITAL | Age: 49
End: 2024-11-06
Attending: INTERNAL MEDICINE
Payer: COMMERCIAL

## 2024-11-06 VITALS
TEMPERATURE: 98 F | HEART RATE: 78 BPM | SYSTOLIC BLOOD PRESSURE: 146 MMHG | RESPIRATION RATE: 16 BRPM | DIASTOLIC BLOOD PRESSURE: 91 MMHG

## 2024-11-06 DIAGNOSIS — T81.89XD NON-HEALING SURGICAL WOUND, SUBSEQUENT ENCOUNTER: ICD-10-CM
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
## 2024-11-06 DIAGNOSIS — I87.303 CHRONIC VENOUS HYPERTENSION INVOLVING BOTH SIDES: ICD-10-CM

## 2024-11-06 DIAGNOSIS — T14.8XXD DELAYED WOUND HEALING: ICD-10-CM

## 2024-11-06 DIAGNOSIS — M62.562 ATROPHY OF MUSCLE OF LEFT LOWER LEG: ICD-10-CM

## 2024-11-06 DIAGNOSIS — L30.8 DERMATITIS ASSOCIATED WITH MOISTURE: ICD-10-CM

## 2024-11-06 DIAGNOSIS — E66.9 OBESITY (BMI 30-39.9): ICD-10-CM

## 2024-11-06 DIAGNOSIS — L97.222 NON-PRESSURE CHRONIC ULCER OF LEFT CALF WITH FAT LAYER EXPOSED (HCC): Primary | ICD-10-CM

## 2024-11-06 PROCEDURE — 29581 APPL MULTLAYER CMPRN SYS LEG: CPT
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
## 2024-11-06 NOTE — PATIENT INSTRUCTIONS
kerecis skin substitute  Continue comprilan.   Dressing changes weekly  Return next Wednesday to see me  Low salt diet  Leg elevation  Watch out for s/o infection    Wound Cleaning and Dressings:    Shower with protection  Use shower boot  DRESSINGS: kerecis skin substitute /xeroform / abd pad  Zinc barrier cream periwound  Change dressing in clinic only    Compression Therapy : comprilan wraps  Compression Therapy Instructions:  1.  Put on first thing in the morning and may remove at bedside.  Okay to wear overnight      if comfortable.  Do not let stockings roll up/down and kink.  Hand wash stockings and      hang dry as needed.    2.  Avoid prolonged standing in one place.  It is better to have your calf muscles moving       to pump fluid out of the legs.    3.  Elevate leg(s) above the level of the heart when sitting or as much as possible.    4.  Take your diuretics as directed by your provider.  Do not skip doses or change doses      unless instructed to do so by your provider.    5. Do not get leg(s) with compression wrap wet. If wraps are too tight as indicated        By pain, numbness/tingling or discoloration of toes remove wrap completely       and call the   wound center.     Miscellaneous Instructions:  Supplement with a daily multivitamin   Low salt diet  Intense blood sugar control - Goal Blood sugar below 180 at all times recommended.  Increase protein intake / consider protein supplements - see below  Elevate extremities at all times when sitting / laying down.    DIETARY MODIFICATIONS TO HELP WITH WOUND HEALING:    Protein: Meats, beans, eggs, milk and yogurt particularly Greek yogurt), tofu, soy nuts, soy protein products    Vitamin C: Citrus fruits and juices, strawberries, tomatoes, tomato juice, peppers, baked potatoes, spinach, broccoli, cauliflower, [place] sprouts, cabbage    Vitamin A: Dark green, leafy vegetables, orange or yellow vegetables, cantaloupe, fortified dairy products, liver,  fortified cereals    Zinc: Fortified cereals, red meats, seafood    Consider Alexys by [employer] (These are essential branch chain amino acids that help with tissue building and wound healing) and take 2 packets/day. you can order online at [employer] or [employer]    ADDITIONAL REMINDERS:    The treatment plan has been discussed at length with you and your provider. Follow all instructions carefully, it is very important. If you do not follow all instructions, you are at  risk of your wound not healing, infection, possible loss of limb and even end of life.  Please call the clinic during regular business hours ( 7:30 AM - 5:30 PM) if you notice increased bleeding, redness, warmth, pain or pus like drainage or start running a fever greater than 100.3.    For after hour emergencies, please call your primary physician or go to the nearest emergency room.

## 2024-11-06 NOTE — PROGRESS NOTES
Weekly Wound Education Note    Teaching Provided To: Patient  Training Topics: Cleasing and general instructions;Compression;Discharge instructions;Dressing;Edema control  Training Method: Explain/Verbal  Training Response: Patient responds and understands;Reinforcement needed        Notes: Improving. Remaining kerecis sample applied, silicone contact layer, xeroform, abd pad, conforming gauze, tape. Comprilan 1x8, 1x10, 1x12. Pt has compression 2 layer stocking to transition into.

## 2024-11-11 ENCOUNTER — HOSPITAL ENCOUNTER (OUTPATIENT)
Dept: ULTRASOUND IMAGING | Age: 49
Discharge: HOME | End: 2024-11-11
Attending: INTERNAL MEDICINE
Payer: COMMERCIAL

## 2024-11-11 DIAGNOSIS — I82.441 ACUTE DEEP VEIN THROMBOSIS (DVT) OF TIBIAL VEIN OF RIGHT LOWER EXTREMITY (HCC): ICD-10-CM

## 2024-11-11 PROCEDURE — 93971 EXTREMITY STUDY: CPT | Performed by: INTERNAL MEDICINE

## 2024-11-13 ENCOUNTER — APPOINTMENT (OUTPATIENT)
Dept: WOUND CARE | Facility: HOSPITAL | Age: 49
End: 2024-11-13
Attending: INTERNAL MEDICINE
Payer: COMMERCIAL

## 2024-11-13 VITALS
HEART RATE: 74 BPM | DIASTOLIC BLOOD PRESSURE: 85 MMHG | RESPIRATION RATE: 16 BRPM | TEMPERATURE: 98 F | SYSTOLIC BLOOD PRESSURE: 131 MMHG

## 2024-11-13 DIAGNOSIS — L08.9 INFECTED WOUND: ICD-10-CM

## 2024-11-13 DIAGNOSIS — L30.8 DERMATITIS ASSOCIATED WITH MOISTURE: ICD-10-CM

## 2024-11-13 DIAGNOSIS — M62.562 ATROPHY OF MUSCLE OF LEFT LOWER LEG: ICD-10-CM

## 2024-11-13 DIAGNOSIS — T81.89XD NON-HEALING SURGICAL WOUND, SUBSEQUENT ENCOUNTER: ICD-10-CM

## 2024-11-13 DIAGNOSIS — I87.303 CHRONIC VENOUS HYPERTENSION INVOLVING BOTH SIDES: ICD-10-CM
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
## 2024-11-13 DIAGNOSIS — T14.8XXD DELAYED WOUND HEALING: ICD-10-CM

## 2024-11-13 DIAGNOSIS — T14.8XXA INFECTED WOUND: ICD-10-CM

## 2024-11-13 DIAGNOSIS — L97.222 NON-PRESSURE CHRONIC ULCER OF LEFT CALF WITH FAT LAYER EXPOSED (HCC): Primary | ICD-10-CM

## 2024-11-13 PROCEDURE — 99214 OFFICE O/P EST MOD 30 MIN: CPT

## 2024-11-13 PROCEDURE — 87205 SMEAR GRAM STAIN: CPT | Performed by: INTERNAL MEDICINE

## 2024-11-13 PROCEDURE — 87186 SC STD MICRODIL/AGAR DIL: CPT | Performed by: INTERNAL MEDICINE

## 2024-11-13 PROCEDURE — 87077 CULTURE AEROBIC IDENTIFY: CPT | Performed by: INTERNAL MEDICINE

## 2024-11-13 PROCEDURE — 87070 CULTURE OTHR SPECIMN AEROBIC: CPT | Performed by: INTERNAL MEDICINE

## 2024-11-13 NOTE — PATIENT INSTRUCTIONS
Trial of topical gentamicin  Wound culture  Hold off on wraps.   Use compression garment for now  Return next Wednesday to see me  Low salt diet  Leg elevation  Watch out for s/o worsening infection    Wound Cleaning and Dressings:    Shower with protection  Use shower boot  DRESSINGS: gentamicin/ bordered gauze  Change dressing twice a day    Compression Therapy : compression garment / spandagrip  Compression Therapy Instructions:  1.  Put on first thing in the morning and may remove at bedside.  Okay to wear overnight      if comfortable.  Do not let stockings roll up/down and kink.  Hand wash stockings and      hang dry as needed.    2.  Avoid prolonged standing in one place.  It is better to have your calf muscles moving       to pump fluid out of the legs.    3.  Elevate leg(s) above the level of the heart when sitting or as much as possible.    4.  Take your diuretics as directed by your provider.  Do not skip doses or change doses      unless instructed to do so by your provider.    5. Do not get leg(s) with compression wrap wet. If wraps are too tight as indicated        By pain, numbness/tingling or discoloration of toes remove wrap completely       and call the   wound center.     Miscellaneous Instructions:  Supplement with a daily multivitamin   Low salt diet  Intense blood sugar control - Goal Blood sugar below 180 at all times recommended.  Increase protein intake / consider protein supplements - see below  Elevate extremities at all times when sitting / laying down.    DIETARY MODIFICATIONS TO HELP WITH WOUND HEALING:    Protein: Meats, beans, eggs, milk and yogurt particularly Greek yogurt), tofu, soy nuts, soy protein products    Vitamin C: Citrus fruits and juices, strawberries, tomatoes, tomato juice, peppers, baked potatoes, spinach, broccoli, cauliflower, [place] sprouts, cabbage    Vitamin A: Dark green, leafy vegetables, orange or yellow vegetables, cantaloupe, fortified dairy products,  liver, fortified cereals    Zinc: Fortified cereals, red meats, seafood    Consider Alexys by [employer] (These are essential branch chain amino acids that help with tissue building and wound healing) and take 2 packets/day. you can order online at [employer] or [email or web address]    ADDITIONAL REMINDERS:    The treatment plan has been discussed at length with you and your provider. Follow all instructions carefully, it is very important. If you do not follow all instructions, you are at  risk of your wound not healing, infection, possible loss of limb and even end of life.  Please call the clinic during regular business hours ( 7:30 AM - 5:30 PM) if you notice increased bleeding, redness, warmth, pain or pus like drainage or start running a fever greater than 100.3.    For after hour emergencies, please call your primary physician or go to the nearest emergency room.

## 2024-11-13 NOTE — PROGRESS NOTES
Weekly Wound Education Note    Teaching Provided To: Patient  Training Topics: Cleasing and general instructions;Compression;Discharge instructions;Dressing;Edema control  Training Method: Explain/Verbal  Training Response: Patient responds and understands;Reinforcement needed        Notes: Cultured today. Gentamicin, bordered gauze, 2 layer medivan compression stocking and spandagrip E. Vashe soaks for 15mins on wound once a day after taking a shower. Dressing cahnges BID.
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
## 2024-11-15 NOTE — PROGRESS NOTES
Called and spoke with patient about recent culture results. Informed patient that culture was positive and provider is recommending patient to continue with topical ABT gentamicin ointment to wound bed. Provider also wants to start patient on oral ABT- bactrim for 7 days. Patient states only allergy is to Penicillin.

## 2024-11-20 ENCOUNTER — OFFICE VISIT (OUTPATIENT)
Dept: WOUND CARE | Facility: HOSPITAL | Age: 49
End: 2024-11-20
Attending: INTERNAL MEDICINE
Payer: COMMERCIAL

## 2024-11-20 VITALS
TEMPERATURE: 98 F | RESPIRATION RATE: 14 BRPM | HEART RATE: 82 BPM | SYSTOLIC BLOOD PRESSURE: 121 MMHG | DIASTOLIC BLOOD PRESSURE: 79 MMHG

## 2024-11-20 DIAGNOSIS — L08.9 INFECTED WOUND: Primary | ICD-10-CM

## 2024-11-20 DIAGNOSIS — T14.8XXA INFECTED WOUND: Primary | ICD-10-CM

## 2024-11-20 DIAGNOSIS — E66.9 OBESITY (BMI 30-39.9): ICD-10-CM

## 2024-11-20 DIAGNOSIS — I87.303 CHRONIC VENOUS HYPERTENSION INVOLVING BOTH SIDES: ICD-10-CM

## 2024-11-20 DIAGNOSIS — L97.222 NON-PRESSURE CHRONIC ULCER OF LEFT CALF WITH FAT LAYER EXPOSED (HCC): ICD-10-CM

## 2024-11-20 DIAGNOSIS — A49.8 PSEUDOMONAS AERUGINOSA INFECTION: ICD-10-CM

## 2024-11-20 DIAGNOSIS — T14.8XXD DELAYED WOUND HEALING: ICD-10-CM

## 2024-11-20 DIAGNOSIS — L30.8 DERMATITIS ASSOCIATED WITH MOISTURE: ICD-10-CM

## 2024-11-20 PROCEDURE — 99213 OFFICE O/P EST LOW 20 MIN: CPT

## 2024-11-20 PROCEDURE — 97597 DBRDMT OPN WND 1ST 20 CM/<: CPT | Performed by: INTERNAL MEDICINE

## 2024-11-20 NOTE — PROGRESS NOTES
[facility] PROGRESS NOTE  EDITA JOHNSON MD  11/20/2024    Chief Complaint:   Chief Complaint   Patient presents with    Wound Care     Follow up for left leg wound. Pt complaining of irritation to leg.        HPI:   Subjective   Yasmine Robles is a 48 year old female coming in for a follow-up visit.    HPI    Wound culture positive for pseudomonas - started bactrim x 1 week  and topical gentamicin.   Wound improved.   Still with slough -debrided down to healthy bleeding tissue.     Continues to have rash and itching periwound.   Wearing compression  garmetn.     Review of Systems  Negative except HPI   Denies chest pain / SOB / palpitations  Denies fever.     Allergies  Allergies[1]    Current Meds:  Current Outpatient Medications   Medication Sig Dispense Refill    sulfamethoxazole-trimethoprim -160 MG Oral Tab per tablet Take 1 tablet by mouth 2 (two) times daily for 7 days. 14 tablet 0    rivaroxaban (XARELTO) 20 MG Oral Tab Take 1 tablet (20 mg total) by mouth daily with food. 30 tablet 1    methocarbamol 750 MG Oral Tab Take 1 tablet (750 mg total) by mouth 4 (four) times daily. 120 tablet 2    pregabalin 75 MG Oral Cap Take 1 capsule (75 mg total) by mouth 3 (three) times daily. 90 capsule 2    ALPRAZolam 0.5 MG Oral Tab Take 1 tablet (0.5 mg total) by mouth nightly as needed. 30 tablet 0    ESCITALOPRAM 20 MG Oral Tab TAKE 1 TABLET BY MOUTH DAILY 30 tablet 11    progesterone 200 MG Oral Cap       Thyroid 65 MG Oral Tab Take 1 tablet (65 mg total) by mouth daily.      cholecalciferol (VITAMIN D3) 125 MCG (5000 UT) Oral Cap Take 1 capsule (5,000 Units total) by mouth daily.      Multiple Vitamin (MULTI-VITAMIN) Oral Tab Take 1 tablet by mouth daily.      magnesium 30 MG Oral Tab Take 1 tablet (30 mg total) by mouth 2 (two) times daily. Unsure of dose      acidophilus-pectin Oral Cap Take 1 capsule by mouth daily. (Patient not taking: Reported on 8/12/2024)      Ferrous Sulfate (IRON OR) Take  by mouth.      Omega-3 Fatty Acids (FISH OIL OR) Take by mouth.      levothyroxine 75 MCG Oral Tab Take 1 tablet (75 mcg total) by mouth daily.           EXAM:   Objective   Objective    Physical Exam    Vital Signs  Vitals:    11/20/24 0700   BP: 121/79   Pulse: 82   Resp: 14   Temp: 98.3 °F (36.8 °C)       Wound Assessment  Wound 08/26/24 #1 Left Medial Superior Leg Left (Active)   Date First Assessed/Time First Assessed: 08/26/24 0905    Wound Number (Wound Clinic Only): #1 Left Medial Superior  Primary Wound Type: Venous Ulcer  Location: Leg  Wound Location Orientation: Left      Assessments 8/26/2024  9:08 AM 11/20/2024 10:03 AM   Wound Image         Drainage Amount Moderate Small   Drainage Description Serosanguineous Yellow;Serous   Treatments Compression (comprilan wraps 8, 10, 12, x2 cellona, coban) Compression (patient's compression stocking)   Wound Length (cm) 17 cm 0.7 cm   Wound Width (cm) 5.6 cm 0.3 cm   Wound Surface Area (cm^2) 95.2 cm^2 0.21 cm^2   Wound Depth (cm) 0.5 cm 0.1 cm   Wound Volume (cm^3) 47.6 cm^3 0.021 cm^3   Wound Healing % -- 100   Margins Well-defined edges Well-defined edges   Non-staged Wound Description Full thickness Full thickness   Marcella-wound Assessment Edema;Ecchymosis Edema;Hemosiderin staining;Other (Comment) (rash)   Wound Granulation Tissue Pink;Spongy Pale Grey;Firm   Wound Bed Granulation (%) 5 % 100 %   Wound Bed Epithelium (%) 10 % --   Wound Bed Slough (%) 40 % --   Wound Bed Eschar (%) 45 % --   Wound Odor None None   Shape Bridged --   Tunneling? -- No   Undermining? -- No   Sinus Tracts? -- No       Active Orders   Date Order Priority Status Authorizing Provider   11/20/24 1016 Debridement Venous Ulcer Left Leg Routine Active Juno Billings MD       Inactive Orders   Date Order Priority Status Authorizing Provider   10/30/24 0944 Cellular tissue product application Venous Ulcer Left Leg Routine Completed Juno Billings MD   10/02/24 1541 Debridement  Venous Ulcer Left Leg Routine Completed Juno Billings MD   09/27/24 0955 Debridement Venous Ulcer Left Leg Routine Completed Juno Billings MD   09/04/24 0910 Debridement Venous Ulcer Left Leg Routine Completed Juno Billings MD   08/26/24 1023 Debridement Venous Ulcer Left Leg Routine Completed Juno Billings MD                ASSESSMENT AND PLAN:     Assessment     Encounter Diagnosis  1. Infected wound    2. Pseudomonas aeruginosa infection    3. Non-pressure chronic ulcer of left calf with fat layer exposed (HCC)    4. Chronic venous hypertension involving both sides    5. Delayed wound healing    6. Dermatitis associated with moisture    7. Obesity (BMI 30-39.9)      PROCEDURES:    Debridement Venous Ulcer Left Leg   Wound 08/26/24 #1 Left Medial Superior Leg Left     Performed by: Juno Billings MD  Authorized by: Juno Billings MD       Consent   Consent obtained? verbal  Consent given by: patient  Risks discussed? procedural risks discussed     Debridement Details  Performed by: physician  Debridement type: conservative sharp  Pain control: lidocaine 4%  Pain control administration type: topical     Pre-debridement measurements  Length (cm): 0.7  Width (cm): 0.3  Depth (cm): 0.1  Surface Area (cm^2): 0.21     Post-debridement measurements  Length (cm): 0.7  Width (cm): 0.3  Depth (cm): 0.2  Percent debrided: 100%  Surface Area (cm^2): 0.21  Area Debrided (cm^2): 0.21  Volume (cm^3): 0.04     Devitalized tissue debrided: biofilm and necrotic debris  Instrument(s) utilized: curette  Comment regarding bleeding: minimal  Hemostasis obtained with: pressure  Procedural pain (0-10): 2  Post-procedural pain: 0   Response to treatment: procedure was tolerated well           PLAN OF CARE:      Serial debridements to hasten healing.   Continue topical gentamicin  Start cortisone cream bradford-wound BID.   Watch out for signs of worsening infection - counseled.  Low salt diet.   Leg  elevation.    Plan of care discussed with patient in detail - All questions answered   Return in one week.     TREATMENT NOTE:  Continue gentamicin, ABD pad, kerlix, tape. Betamethasone to periwound wound. Applied patient's 2 layer medivan layer stocking. Pt states wearing the spandagrip with her stockings was too much, instead wears her garement during the day and spandagirp at night.     Orders  Orders Placed This Encounter   Procedures    Debridement Venous Ulcer Left Leg     Patient Instructions     Continue topical gentamicin  Hydrocortisone cream periwound .  Use compression garment  Return next Wednesday to see me  Low salt diet  Leg elevation  Watch out for s/o worsening infection    Wound Cleaning and Dressings:    Shower with protection  Use shower boot  DRESSINGS: gentamicin/ bordered gauze  Change dressing twice a day    Compression Therapy : compression garment / spandagrip  Compression Therapy Instructions:  1.  Put on first thing in the morning and may remove at bedside.  Okay to wear overnight      if comfortable.  Do not let stockings roll up/down and kink.  Hand wash stockings and      hang dry as needed.    2.  Avoid prolonged standing in one place.  It is better to have your calf muscles moving       to pump fluid out of the legs.    3.  Elevate leg(s) above the level of the heart when sitting or as much as possible.    4.  Take your diuretics as directed by your provider.  Do not skip doses or change doses      unless instructed to do so by your provider.    5. Do not get leg(s) with compression wrap wet. If wraps are too tight as indicated        By pain, numbness/tingling or discoloration of toes remove wrap completely       and call the   wound center.     Miscellaneous Instructions:  Supplement with a daily multivitamin   Low salt diet  Intense blood sugar control - Goal Blood sugar below 180 at all times recommended.  Increase protein intake / consider protein supplements - see  below  Elevate extremities at all times when sitting / laying down.    DIETARY MODIFICATIONS TO HELP WITH WOUND HEALING:    Protein: Meats, beans, eggs, milk and yogurt particularly Greek yogurt), tofu, soy nuts, soy protein products    Vitamin C: Citrus fruits and juices, strawberries, tomatoes, tomato juice, peppers, baked potatoes, spinach, broccoli, cauliflower, [place] sprouts, cabbage    Vitamin A: Dark green, leafy vegetables, orange or yellow vegetables, cantaloupe, fortified dairy products, liver, fortified cereals    Zinc: Fortified cereals, red meats, seafood    Consider Alexys by [employer] (These are essential branch chain amino acids that help with tissue building and wound healing) and take 2 packets/day. you can order online at [employer] or [employer]    ADDITIONAL REMINDERS:    The treatment plan has been discussed at length with you and your provider. Follow all instructions carefully, it is very important. If you do not follow all instructions, you are at  risk of your wound not healing, infection, possible loss of limb and even end of life.  Please call the clinic during regular business hours ( 7:30 AM - 5:30 PM) if you notice increased bleeding, redness, warmth, pain or pus like drainage or start running a fever greater than 100.3.    For after hour emergencies, please call your primary physician or go to the nearest emergency room.      Patient/Caregiver Education: There are no barriers to learning. Medical education for above diagnosis given.   Answered all questions.    Outcome: Patient verbalizes understanding. Patient is notified to call with any questions, complications, allergies, or worsening or changing symptoms.  Patient is to call with any side effects or complications as a result of the treatments today.      DOCUMENTATION OF TIME SPENT: Code selection for this visit was based on time spent : 30 min on date of service in preparing to see the patient, obtaining and/or reviewing separately  obtained history, performing a medically appropriate examination, counseling and educating the patient/family/caregiver, ordering medications or testing, referring and communicating with other healthcare providers, documenting clinical information in the E HR, independently interpreting results and communicating results to the patient/family/caregiver and care coordination with the patient's other providers.    Followup: Return in about 1 week (around 11/27/2024) for Wound followup.      Note to Patient:  The 21st Century Cures Act makes medical notes like these available to patients in the interest of transparency. However, be advised this is a medical document and is intended as [id number]-[id number] communication; it is written in medical language and may appear blunt, direct, or contain abbreviations or verbiage that are unfamiliar. Medical documents are intended to carry relevant information, facts as evident, and the clinical opinion of the practitioner.    Also, please note that this report has been produced using speech recognition software and may contain errors related to that system including, but not limited to, errors in grammar, punctuation, and spelling, as well as words and phrases that possibly may have been recognized inappropriately.  If there are any questions or concerns, contact the dictating provider for clarification.      Juno Oquendo MD  11/20/2024  10:17 AM                    [1]   Allergies  Allergen Reactions    Pcn [Penicillins] HIVES    Keflex RASH and ITCHING

## 2024-11-20 NOTE — PROGRESS NOTES
Patient ID: Yasmine Robles is a 48 year old female.    Debridement Venous Ulcer Left Leg   Wound 08/26/24 #1 Left Medial Superior Leg Left    Performed by: Juno Billings MD  Authorized by: Juno Billings MD      Consent   Consent obtained? verbal  Consent given by: patient  Risks discussed? procedural risks discussed    Debridement Details  Performed by: physician  Debridement type: conservative sharp  Pain control: lidocaine 4%  Pain control administration type: topical    Pre-debridement measurements  Length (cm): 0.7  Width (cm): 0.3  Depth (cm): 0.1  Surface Area (cm^2): 0.21    Post-debridement measurements  Length (cm): 0.7  Width (cm): 0.3  Depth (cm): 0.2  Percent debrided: 100%  Surface Area (cm^2): 0.21  Area Debrided (cm^2): 0.21  Volume (cm^3): 0.04    Devitalized tissue debrided: biofilm and necrotic debris  Instrument(s) utilized: curette  Comment regarding bleeding: minimal  Hemostasis obtained with: pressure  Procedural pain (0-10): 2  Post-procedural pain: 0   Response to treatment: procedure was tolerated well

## 2024-11-20 NOTE — PROGRESS NOTES
Weekly Wound Education Note    Teaching Provided To: Patient  Training Topics: Cleasing and general instructions;Compression;Discharge instructions;Dressing;Edema control  Training Method: Explain/Verbal  Training Response: Patient responds and understands;Reinforcement needed        Notes: Stable. Continue gentamicin, ABD pad, kerlix, tape. Betamethasone to periwound wound. Applied patient's 2 layer medivan layer stocking. Pt states wearing the spandagrip with her stockings was too much, instead wears her garement during the day and spandagirp at night.

## 2024-11-20 NOTE — PATIENT INSTRUCTIONS
Continue topical gentamicin  Hydrocortisone cream periwound .  Use compression garment  Return next Wednesday to see me  Low salt diet  Leg elevation  Watch out for s/o worsening infection    Wound Cleaning and Dressings:    Shower with protection  Use shower boot  DRESSINGS: gentamicin/ bordered gauze  Change dressing twice a day    Compression Therapy : compression garment / spandagrip  Compression Therapy Instructions:  1.  Put on first thing in the morning and may remove at bedside.  Okay to wear overnight      if comfortable.  Do not let stockings roll up/down and kink.  Hand wash stockings and      hang dry as needed.    2.  Avoid prolonged standing in one place.  It is better to have your calf muscles moving       to pump fluid out of the legs.    3.  Elevate leg(s) above the level of the heart when sitting or as much as possible.    4.  Take your diuretics as directed by your provider.  Do not skip doses or change doses      unless instructed to do so by your provider.    5. Do not get leg(s) with compression wrap wet. If wraps are too tight as indicated        By pain, numbness/tingling or discoloration of toes remove wrap completely       and call the   wound center.     Miscellaneous Instructions:  Supplement with a daily multivitamin   Low salt diet  Intense blood sugar control - Goal Blood sugar below 180 at all times recommended.  Increase protein intake / consider protein supplements - see below  Elevate extremities at all times when sitting / laying down.    DIETARY MODIFICATIONS TO HELP WITH WOUND HEALING:    Protein: Meats, beans, eggs, milk and yogurt particularly Greek yogurt), tofu, soy nuts, soy protein products    Vitamin C: Citrus fruits and juices, strawberries, tomatoes, tomato juice, peppers, baked potatoes, spinach, broccoli, cauliflower, [place] sprouts, cabbage    Vitamin A: Dark green, leafy vegetables, orange or yellow vegetables, cantaloupe, fortified dairy products, liver,  fortified cereals    Zinc: Fortified cereals, red meats, seafood    Consider Alexys by [employer] (These are essential branch chain amino acids that help with tissue building and wound healing) and take 2 packets/day. you can order online at [employer] or [employer]    ADDITIONAL REMINDERS:    The treatment plan has been discussed at length with you and your provider. Follow all instructions carefully, it is very important. If you do not follow all instructions, you are at  risk of your wound not healing, infection, possible loss of limb and even end of life.  Please call the clinic during regular business hours ( 7:30 AM - 5:30 PM) if you notice increased bleeding, redness, warmth, pain or pus like drainage or start running a fever greater than 100.3.    For after hour emergencies, please call your primary physician or go to the nearest emergency room.

## 2024-11-27 ENCOUNTER — OFFICE VISIT (OUTPATIENT)
Dept: WOUND CARE | Facility: HOSPITAL | Age: 49
End: 2024-11-27
Attending: INTERNAL MEDICINE
Payer: COMMERCIAL

## 2024-11-27 VITALS
RESPIRATION RATE: 12 BRPM | SYSTOLIC BLOOD PRESSURE: 123 MMHG | TEMPERATURE: 98 F | DIASTOLIC BLOOD PRESSURE: 81 MMHG | HEART RATE: 74 BPM

## 2024-11-27 DIAGNOSIS — L97.222 NON-PRESSURE CHRONIC ULCER OF LEFT CALF WITH FAT LAYER EXPOSED (HCC): Primary | ICD-10-CM

## 2024-11-27 DIAGNOSIS — T14.8XXD DELAYED WOUND HEALING: ICD-10-CM

## 2024-11-27 DIAGNOSIS — E66.9 OBESITY (BMI 30-39.9): ICD-10-CM

## 2024-11-27 DIAGNOSIS — A49.8 PSEUDOMONAS AERUGINOSA INFECTION: ICD-10-CM

## 2024-11-27 DIAGNOSIS — I87.303 CHRONIC VENOUS HYPERTENSION INVOLVING BOTH SIDES: ICD-10-CM
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
## 2024-11-27 DIAGNOSIS — L30.8 DERMATITIS ASSOCIATED WITH MOISTURE: ICD-10-CM

## 2024-11-27 PROCEDURE — 99213 OFFICE O/P EST LOW 20 MIN: CPT

## 2024-11-27 NOTE — PATIENT INSTRUCTIONS
Start xeroform wound base  Hydrocortisone cream periwound .  Use compression garment  Low salt diet  Leg elevation  Watch out for s/o worsening infection    Wound Cleaning and Dressings:    Shower with protection  Use shower boot  DRESSINGS: xeroform / gauze / conforming gauze / tape  Change dressing every 1-2 days    Compression Therapy : compression garment / spandagrip    Compression Therapy Instructions:  1.  Put on first thing in the morning and may remove at bedside.  Okay to wear overnight      if comfortable.  Do not let stockings roll up/down and kink.  Hand wash stockings and      hang dry as needed.    2.  Avoid prolonged standing in one place.  It is better to have your calf muscles moving       to pump fluid out of the legs.    3.  Elevate leg(s) above the level of the heart when sitting or as much as possible.    4.  Take your diuretics as directed by your provider.  Do not skip doses or change doses      unless instructed to do so by your provider.    5. Do not get leg(s) with compression wrap wet. If wraps are too tight as indicated        By pain, numbness/tingling or discoloration of toes remove wrap completely       and call the   wound center.     Miscellaneous Instructions:  Supplement with a daily multivitamin   Low salt diet  Intense blood sugar control - Goal Blood sugar below 180 at all times recommended.  Increase protein intake / consider protein supplements - see below  Elevate extremities at all times when sitting / laying down.    DIETARY MODIFICATIONS TO HELP WITH WOUND HEALING:    Protein: Meats, beans, eggs, milk and yogurt particularly Greek yogurt), tofu, soy nuts, soy protein products    Vitamin C: Citrus fruits and juices, strawberries, tomatoes, tomato juice, peppers, baked potatoes, spinach, broccoli, cauliflower, [place] sprouts, cabbage    Vitamin A: Dark green, leafy vegetables, orange or yellow vegetables, cantaloupe, fortified dairy products, liver, fortified  cereals    Zinc: Fortified cereals, red meats, seafood    Consider Alexys by [employer] (These are essential branch chain amino acids that help with tissue building and wound healing) and take 2 packets/day. you can order online at [employer] or [employer]    ADDITIONAL REMINDERS:    The treatment plan has been discussed at length with you and your provider. Follow all instructions carefully, it is very important. If you do not follow all instructions, you are at  risk of your wound not healing, infection, possible loss of limb and even end of life.  Please call the clinic during regular business hours ( 7:30 AM - 5:30 PM) if you notice increased bleeding, redness, warmth, pain or pus like drainage or start running a fever greater than 100.3.    For after hour emergencies, please call your primary physician or go to the nearest emergency room.

## 2024-11-27 NOTE — PROGRESS NOTES
Weekly Wound Education Note    Teaching Provided To: Patient  Training Topics: Cleasing and general instructions;Compression;Discharge instructions;Dressing;Edema control  Training Method: Explain/Verbal  Training Response: Patient responds and understands;Reinforcement needed        Notes: Improving. Betamethasone to periwound/leg, xeroform, gauze, conforming gauze, tape, 2 layer compression stocking. Return in 2 weeks. Advised to call if there are any questions/concerns, montior s/s of infection.
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
## 2024-12-02 DIAGNOSIS — S42.102D CLOSED FRACTURE OF LEFT SCAPULA WITH ROUTINE HEALING, UNSPECIFIED PART OF SCAPULA, SUBSEQUENT ENCOUNTER: ICD-10-CM

## 2024-12-02 DIAGNOSIS — S22.42XD CLOSED FRACTURE OF MULTIPLE RIBS OF LEFT SIDE WITH ROUTINE HEALING, SUBSEQUENT ENCOUNTER: ICD-10-CM

## 2024-12-03 NOTE — TELEPHONE ENCOUNTER
Medication(s) to Refill:   Requested Prescriptions     Pending Prescriptions Disp Refills    PREGABALIN 75 MG Oral Cap [Pharmacy Med Name: PREGABALIN 75MG CAPSULES] 90 capsule 0     Sig: TAKE 1 CAPSULE(75 MG) BY MOUTH THREE TIMES DAILY         Reason for Medication Refill being sent to Provider / Reason Protocol Failed:  [] 90 day refill has already been granted  [] Blood Pressure out of range  [] Labs Abnormal/over due  [] Medication not previously prescribed by Provider  [] Non-Protocol Medication  [] Controlled Substance   [] Due for appointment- no future appointment scheduled  [] No Follow up specified      Last Time Medication was Filled:  9/10/24      Last Office Visit with PCP: 8/12/24    When Patient was Due Back to the Office:    (from when PCP last addressed condition)    Future Appointments:  Future Appointments   Date Time Provider Department Center   12/11/2024  9:30 AM Juno Billings MD  [facility]         Last Blood Pressures:  BP Readings from Last 2 Encounters:   11/27/24 123/81   11/20/24 121/79       Action taken:  [] Refill approved per protocol  [] Routing to provider for approval

## 2024-12-04 RX ORDER — PREGABALIN 75 MG/1
75 CAPSULE ORAL 3 TIMES DAILY
Qty: 90 CAPSULE | Refills: 0 | Status: SHIPPED | OUTPATIENT
Start: 2024-12-04

## 2024-12-11 ENCOUNTER — OFFICE VISIT (OUTPATIENT)
Dept: WOUND CARE | Facility: HOSPITAL | Age: 49
End: 2024-12-11
Attending: INTERNAL MEDICINE
Payer: COMMERCIAL

## 2024-12-11 VITALS
RESPIRATION RATE: 16 BRPM | SYSTOLIC BLOOD PRESSURE: 134 MMHG | DIASTOLIC BLOOD PRESSURE: 85 MMHG | HEART RATE: 79 BPM | TEMPERATURE: 99 F

## 2024-12-11 DIAGNOSIS — I87.303 CHRONIC VENOUS HYPERTENSION INVOLVING BOTH SIDES: ICD-10-CM

## 2024-12-11 DIAGNOSIS — L97.222 NON-PRESSURE CHRONIC ULCER OF LEFT CALF WITH FAT LAYER EXPOSED (HCC): Primary | ICD-10-CM
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
## 2024-12-11 DIAGNOSIS — T14.8XXD DELAYED WOUND HEALING: ICD-10-CM

## 2024-12-11 DIAGNOSIS — L30.8 DERMATITIS ASSOCIATED WITH MOISTURE: ICD-10-CM

## 2024-12-11 DIAGNOSIS — E66.9 OBESITY (BMI 30-39.9): ICD-10-CM

## 2024-12-11 PROCEDURE — 99213 OFFICE O/P EST LOW 20 MIN: CPT
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
## 2024-12-11 NOTE — PATIENT INSTRUCTIONS
Wound healed - discharge from clinic.   Ok to shower   Keep legs moisturized.   Wear compression garment when not in bed.

## 2024-12-11 NOTE — PROGRESS NOTES
Weekly Wound Education Note    Teaching Provided To: Patient  Training Topics: Cleasing and general instructions;Compression;Discharge instructions;Edema control;Skin care;Signs and symptoms of infection  Training Method: Explain/Verbal  Training Response: Patient responds and understands        Notes: Per provider wound is healed. 2 layer compression stocking applied - advised to continnue wearing, ok to remove at bedtime. She states she is having surgery tomorrow with ortho on the left leg. Reminded her on the importance of compression to help keep swelling down. Advised to call wound clinic is there is any questions or concerns. Discharged from wound clinic.

## 2025-01-02 DIAGNOSIS — S22.42XD CLOSED FRACTURE OF MULTIPLE RIBS OF LEFT SIDE WITH ROUTINE HEALING, SUBSEQUENT ENCOUNTER: ICD-10-CM

## 2025-01-02 DIAGNOSIS — S42.102D CLOSED FRACTURE OF LEFT SCAPULA WITH ROUTINE HEALING, UNSPECIFIED PART OF SCAPULA, SUBSEQUENT ENCOUNTER: ICD-10-CM
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
## 2025-01-02 RX ORDER — METHOCARBAMOL 750 MG/1
750 TABLET, FILM COATED ORAL 4 TIMES DAILY
Qty: 120 TABLET | Refills: 2 | Status: SHIPPED | OUTPATIENT
Start: 2025-01-02

## 2025-01-02 NOTE — TELEPHONE ENCOUNTER
Medication(s) to Refill:   Requested Prescriptions     Pending Prescriptions Disp Refills    METHOCARBAMOL 750 MG Oral Tab [Pharmacy Med Name: METHOCARBAMOL 750MG TABLETS] 120 tablet 2     Sig: TAKE 1 TABLET(750 MG) BY MOUTH FOUR TIMES DAILY         Reason for Medication Refill being sent to Provider / Reason Protocol Failed:  [] 90 day refill has already been granted  [] Blood Pressure out of range  [] Labs Abnormal/over due  [] Medication not previously prescribed by Provider  [x] Non-Protocol Medication  [] Controlled Substance   [] Due for appointment- no future appointment scheduled  [] No Follow up specified      Last Time Medication was Filled:  11/25/2024      Last Office Visit with PCP: 8/12/2024    When Patient was Due Back to the Office:  not on file   (from when PCP last addressed condition)    Future Appointments:  No future appointments.      Last Blood Pressures:  BP Readings from Last 2 Encounters:   12/11/24 134/85   11/27/24 123/81       Action taken:  [] Refill approved per protocol  [x] Routing to provider for approval

## 2025-01-10 DIAGNOSIS — S42.102D CLOSED FRACTURE OF LEFT SCAPULA WITH ROUTINE HEALING, UNSPECIFIED PART OF SCAPULA, SUBSEQUENT ENCOUNTER: ICD-10-CM
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
## 2025-01-10 DIAGNOSIS — S22.42XD CLOSED FRACTURE OF MULTIPLE RIBS OF LEFT SIDE WITH ROUTINE HEALING, SUBSEQUENT ENCOUNTER: ICD-10-CM
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
## 2025-01-13 DIAGNOSIS — S42.102D CLOSED FRACTURE OF LEFT SCAPULA WITH ROUTINE HEALING, UNSPECIFIED PART OF SCAPULA, SUBSEQUENT ENCOUNTER: ICD-10-CM
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
## 2025-01-13 DIAGNOSIS — S22.42XD CLOSED FRACTURE OF MULTIPLE RIBS OF LEFT SIDE WITH ROUTINE HEALING, SUBSEQUENT ENCOUNTER: ICD-10-CM

## 2025-01-13 RX ORDER — PREGABALIN 75 MG/1
75 CAPSULE ORAL 3 TIMES DAILY
Qty: 90 CAPSULE | Refills: 0 | Status: SHIPPED | OUTPATIENT
Start: 2025-01-13

## 2025-01-13 RX ORDER — PREGABALIN 75 MG/1
75 CAPSULE ORAL 3 TIMES DAILY
Qty: 90 CAPSULE | Refills: 0 | Status: CANCELLED | OUTPATIENT
Start: 2025-01-13

## 2025-01-14 NOTE — TELEPHONE ENCOUNTER
Signed Yesterday (1/13/2025):   PREGABALIN 75 MG Oral Cap   Sig: TAKE 1 CAPSULE(75 MG) BY MOUTH THREE TIMES DAILY   Disp: 90 capsule    Refills: 0   Signed by: Zhanna Davis MD

## 2025-01-20 DIAGNOSIS — F41.9 ANXIETY: ICD-10-CM

## 2025-01-20 RX ORDER — ESCITALOPRAM OXALATE 20 MG/1
20 TABLET ORAL DAILY
Qty: 90 TABLET | Refills: 1 | Status: SHIPPED | OUTPATIENT
Start: 2025-01-20

## 2025-01-21 RX ORDER — ESCITALOPRAM OXALATE 20 MG/1
20 TABLET ORAL DAILY
Qty: 30 TABLET | Refills: 11 | OUTPATIENT
Start: 2025-01-21

## 2025-01-23 DIAGNOSIS — G47.00 INSOMNIA, UNSPECIFIED TYPE: ICD-10-CM

## 2025-01-23 RX ORDER — ALPRAZOLAM 0.5 MG
0.5 TABLET ORAL NIGHTLY PRN
Qty: 30 TABLET | Refills: 0 | Status: SHIPPED | OUTPATIENT
Start: 2025-01-23

## 2025-01-29 ENCOUNTER — HOSPITAL ENCOUNTER (OUTPATIENT)
Dept: ULTRASOUND IMAGING | Age: 50
Discharge: HOME | End: 2025-01-29
Attending: INTERNAL MEDICINE
Payer: COMMERCIAL

## 2025-01-29 DIAGNOSIS — I82.441 ACUTE DEEP VEIN THROMBOSIS (DVT) OF TIBIAL VEIN OF RIGHT LOWER EXTREMITY (HCC): ICD-10-CM

## 2025-01-29 PROCEDURE — 93971 EXTREMITY STUDY: CPT | Performed by: INTERNAL MEDICINE

## 2025-02-05 ENCOUNTER — OFFICE VISIT (OUTPATIENT)
Age: 50
End: 2025-02-05
Attending: INTERNAL MEDICINE
Payer: COMMERCIAL

## 2025-02-05 VITALS
TEMPERATURE: 98 F | BODY MASS INDEX: 37.92 KG/M2 | OXYGEN SATURATION: 99 % | SYSTOLIC BLOOD PRESSURE: 145 MMHG | WEIGHT: 214 LBS | RESPIRATION RATE: 20 BRPM | HEIGHT: 63 IN | HEART RATE: 76 BPM | DIASTOLIC BLOOD PRESSURE: 89 MMHG

## 2025-02-05 DIAGNOSIS — I82.441 ACUTE DEEP VEIN THROMBOSIS (DVT) OF TIBIAL VEIN OF RIGHT LOWER EXTREMITY (HCC): ICD-10-CM

## 2025-02-05 DIAGNOSIS — R91.1 LUNG NODULE: ICD-10-CM

## 2025-02-05 DIAGNOSIS — I80.01 THROMBOPHLEBITIS OF SUPERFICIAL VEINS OF RIGHT LOWER EXTREMITY: Primary | ICD-10-CM

## 2025-02-05 NOTE — PROGRESS NOTES
Here for follow up- DVT. Having continued swelling in her RLE. She is not having increased pain but is feeling tightness in the area (calf to above the knee). Had repeat ultrasound done which showed fluid on the leg.
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
## 2025-02-09 DIAGNOSIS — S42.102D CLOSED FRACTURE OF LEFT SCAPULA WITH ROUTINE HEALING, UNSPECIFIED PART OF SCAPULA, SUBSEQUENT ENCOUNTER: ICD-10-CM

## 2025-02-09 DIAGNOSIS — S22.42XD CLOSED FRACTURE OF MULTIPLE RIBS OF LEFT SIDE WITH ROUTINE HEALING, SUBSEQUENT ENCOUNTER: ICD-10-CM
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
## 2025-02-11 RX ORDER — PREGABALIN 75 MG/1
75 CAPSULE ORAL 3 TIMES DAILY
Qty: 90 CAPSULE | Refills: 0 | Status: SHIPPED | OUTPATIENT
Start: 2025-02-11

## 2025-03-18 DIAGNOSIS — S42.102D CLOSED FRACTURE OF LEFT SCAPULA WITH ROUTINE HEALING, UNSPECIFIED PART OF SCAPULA, SUBSEQUENT ENCOUNTER: ICD-10-CM
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
## 2025-03-18 DIAGNOSIS — S22.42XD CLOSED FRACTURE OF MULTIPLE RIBS OF LEFT SIDE WITH ROUTINE HEALING, SUBSEQUENT ENCOUNTER: ICD-10-CM

## 2025-03-18 RX ORDER — PREGABALIN 75 MG/1
75 CAPSULE ORAL 3 TIMES DAILY
Qty: 90 CAPSULE | Refills: 0 | OUTPATIENT
Start: 2025-03-18

## 2025-03-19 ENCOUNTER — HOSPITAL ENCOUNTER (OUTPATIENT)
Dept: ULTRASOUND IMAGING | Age: 50
End: 2025-03-19
Attending: INTERNAL MEDICINE
Payer: COMMERCIAL

## 2025-03-19 RX ORDER — PREGABALIN 75 MG/1
75 CAPSULE ORAL 3 TIMES DAILY
Qty: 90 CAPSULE | Refills: 0 | Status: SHIPPED | OUTPATIENT
Start: 2025-03-19

## 2025-03-20 ENCOUNTER — HOSPITAL ENCOUNTER (OUTPATIENT)
Dept: ULTRASOUND IMAGING | Facility: HOSPITAL | Age: 50
Discharge: HOME | End: 2025-03-20
Attending: INTERNAL MEDICINE
Payer: COMMERCIAL

## 2025-03-20 DIAGNOSIS — I80.01 THROMBOPHLEBITIS OF SUPERFICIAL VEINS OF RIGHT LOWER EXTREMITY: ICD-10-CM

## 2025-03-20 PROCEDURE — 93971 EXTREMITY STUDY: CPT | Performed by: INTERNAL MEDICINE

## 2025-05-05 DIAGNOSIS — S22.42XD CLOSED FRACTURE OF MULTIPLE RIBS OF LEFT SIDE WITH ROUTINE HEALING, SUBSEQUENT ENCOUNTER: ICD-10-CM

## 2025-05-05 DIAGNOSIS — S42.102D CLOSED FRACTURE OF LEFT SCAPULA WITH ROUTINE HEALING, UNSPECIFIED PART OF SCAPULA, SUBSEQUENT ENCOUNTER: ICD-10-CM
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
## 2025-05-06 RX ORDER — PREGABALIN 75 MG/1
75 CAPSULE ORAL 3 TIMES DAILY
Qty: 90 CAPSULE | Refills: 0 | Status: SHIPPED | OUTPATIENT
Start: 2025-05-06

## 2025-05-06 NOTE — TELEPHONE ENCOUNTER
Please review; protocol failed/ has no protocol      Recent fills: 03/19/2025,02/11/2025,01/13/2025  Last Rx written: 03/19/2025  Last Office Visit: 08/12/2024    Recent Visits  Date Type Provider Dept   [facility]12[facility]     Future Appointments  Date Type Provider Dept   06/09/25 [facility]   Showing future appointments within next 150 days with a meds authorizing provider and meeting all other requirements

## 2025-05-06 NOTE — TELEPHONE ENCOUNTER
Please review; protocol failed/ has no protocol      Recent fills: 03/19/2025,02/11/2025,01/13/2025  Last Rx written: 03/19/2025  Last Office Visit: 08/12/2024    Recent Visits  Date Type Provider Dept   [facility]     Future Appointments  Date Type Provider Dept   06[facility]09[facility]   Showing future appointments within next 150 days with a meds authorizing provider and meeting all other requirements

## 2025-06-02 DIAGNOSIS — F41.9 ANXIETY: ICD-10-CM

## 2025-06-03 RX ORDER — ESCITALOPRAM OXALATE 20 MG/1
20 TABLET ORAL DAILY
Qty: 90 TABLET | Refills: 3 | OUTPATIENT
Start: 2025-06-03

## 2025-07-06 ENCOUNTER — TELEPHONE (OUTPATIENT)
Dept: FAMILY MEDICINE CLINIC | Facility: CLINIC | Age: 50
End: 2025-07-06

## 2025-07-06 DIAGNOSIS — F41.9 ANXIETY: ICD-10-CM

## 2025-07-08 ENCOUNTER — TELEPHONE (OUTPATIENT)
Dept: FAMILY MEDICINE CLINIC | Facility: CLINIC | Age: 50
End: 2025-07-08

## 2025-07-08 NOTE — TELEPHONE ENCOUNTER
Patient scheduled for pre-op tomorrow.     Nerve Decompression  DOS 7/21/2025  Phone number [phone]   Dr Cobos in [place].     Patient states she will bring the pre-op orders with her.

## 2025-07-09 ENCOUNTER — LAB ENCOUNTER (OUTPATIENT)
Dept: LAB | Age: 50
End: 2025-07-09
Attending: NURSE PRACTITIONER
Payer: COMMERCIAL

## 2025-07-09 ENCOUNTER — OFFICE VISIT (OUTPATIENT)
Dept: FAMILY MEDICINE CLINIC | Facility: CLINIC | Age: 50
End: 2025-07-09
Payer: COMMERCIAL

## 2025-07-09 VITALS
DIASTOLIC BLOOD PRESSURE: 80 MMHG | RESPIRATION RATE: 16 BRPM | HEIGHT: 63 IN | SYSTOLIC BLOOD PRESSURE: 110 MMHG | WEIGHT: 209 LBS | HEART RATE: 72 BPM | BODY MASS INDEX: 37.03 KG/M2 | OXYGEN SATURATION: 98 %

## 2025-07-09 DIAGNOSIS — Z01.818 PREOPERATIVE GENERAL PHYSICAL EXAMINATION: Primary | ICD-10-CM

## 2025-07-09 DIAGNOSIS — M21.372 LEFT FOOT DROP: ICD-10-CM

## 2025-07-09 DIAGNOSIS — Z01.818 PREOPERATIVE GENERAL PHYSICAL EXAMINATION: ICD-10-CM

## 2025-07-09 LAB
ALBUMIN SERPL-MCNC: 4.8 G/DL (ref 3.2–4.8)
ALBUMIN/GLOB SERPL: 1.8 {RATIO} (ref 1–2)
ALP LIVER SERPL-CCNC: 67 U/L (ref 39–100)
ALT SERPL-CCNC: 17 U/L (ref 10–49)
ANION GAP SERPL CALC-SCNC: 8 MMOL/L (ref 0–18)
AST SERPL-CCNC: 19 U/L (ref ?–34)
ATRIAL RATE: 71 BPM
BASOPHILS # BLD AUTO: 0.06 X10(3) UL (ref 0–0.2)
BASOPHILS NFR BLD AUTO: 1.3 %
BILIRUB SERPL-MCNC: 0.4 MG/DL (ref 0.3–1.2)
BUN BLD-MCNC: 22 MG/DL (ref 9–23)
CALCIUM BLD-MCNC: 9.7 MG/DL (ref 8.7–10.6)
CHLORIDE SERPL-SCNC: 103 MMOL/L (ref 98–112)
CO2 SERPL-SCNC: 29 MMOL/L (ref 21–32)
CREAT BLD-MCNC: 1.02 MG/DL (ref 0.55–1.02)
EGFRCR SERPLBLD CKD-EPI 2021: 67 ML/MIN/1.73M2 (ref 60–?)
EOSINOPHIL # BLD AUTO: 0.11 X10(3) UL (ref 0–0.7)
EOSINOPHIL NFR BLD AUTO: 2.3 %
ERYTHROCYTE [DISTWIDTH] IN BLOOD BY AUTOMATED COUNT: 13.7 %
FASTING STATUS PATIENT QL REPORTED: YES
GLOBULIN PLAS-MCNC: 2.7 G/DL (ref 2–3.5)
GLUCOSE BLD-MCNC: 93 MG/DL (ref 70–99)
HCT VFR BLD AUTO: 43 % (ref 35–48)
HGB BLD-MCNC: 14.2 G/DL (ref 12–16)
IMM GRANULOCYTES # BLD AUTO: 0.01 X10(3) UL (ref 0–1)
IMM GRANULOCYTES NFR BLD: 0.2 %
LYMPHOCYTES # BLD AUTO: 1.57 X10(3) UL (ref 1–4)
LYMPHOCYTES NFR BLD AUTO: 33.1 %
MCH RBC QN AUTO: 30.1 PG (ref 26–34)
MCHC RBC AUTO-ENTMCNC: 33 G/DL (ref 31–37)
MCV RBC AUTO: 91.3 FL (ref 80–100)
MONOCYTES # BLD AUTO: 0.48 X10(3) UL (ref 0.1–1)
MONOCYTES NFR BLD AUTO: 10.1 %
NEUTROPHILS # BLD AUTO: 2.52 X10 (3) UL (ref 1.5–7.7)
NEUTROPHILS # BLD AUTO: 2.52 X10(3) UL (ref 1.5–7.7)
NEUTROPHILS NFR BLD AUTO: 53 %
OSMOLALITY SERPL CALC.SUM OF ELEC: 293 MOSM/KG (ref 275–295)
P AXIS: 23 DEGREES
P-R INTERVAL: 114 MS
PLATELET # BLD AUTO: 366 10(3)UL (ref 150–450)
POTASSIUM SERPL-SCNC: 4 MMOL/L (ref 3.5–5.1)
PROT SERPL-MCNC: 7.5 G/DL (ref 5.7–8.2)
Q-T INTERVAL: 416 MS
QRS DURATION: 82 MS
QTC CALCULATION (BEZET): 452 MS
R AXIS: 29 DEGREES
RBC # BLD AUTO: 4.71 X10(6)UL (ref 3.8–5.3)
SODIUM SERPL-SCNC: 140 MMOL/L (ref 136–145)
T AXIS: 15 DEGREES
VENTRICULAR RATE: 71 BPM
WBC # BLD AUTO: 4.8 X10(3) UL (ref 4–11)

## 2025-07-09 PROCEDURE — 85025 COMPLETE CBC W/AUTO DIFF WBC: CPT | Performed by: NURSE PRACTITIONER

## 2025-07-09 PROCEDURE — 3074F SYST BP LT 130 MM HG: CPT | Performed by: NURSE PRACTITIONER

## 2025-07-09 PROCEDURE — 3008F BODY MASS INDEX DOCD: CPT | Performed by: NURSE PRACTITIONER

## 2025-07-09 PROCEDURE — 3079F DIAST BP 80-89 MM HG: CPT | Performed by: NURSE PRACTITIONER

## 2025-07-09 PROCEDURE — 99214 OFFICE O/P EST MOD 30 MIN: CPT | Performed by: NURSE PRACTITIONER

## 2025-07-09 PROCEDURE — 93000 ELECTROCARDIOGRAM COMPLETE: CPT | Performed by: NURSE PRACTITIONER

## 2025-07-09 PROCEDURE — 80053 COMPREHEN METABOLIC PANEL: CPT | Performed by: NURSE PRACTITIONER

## 2025-07-09 RX ORDER — ESCITALOPRAM OXALATE 20 MG/1
20 TABLET ORAL DAILY
Qty: 90 TABLET | Refills: 3 | Status: SHIPPED | OUTPATIENT
Start: 2025-07-09

## 2025-07-09 NOTE — PROGRESS NOTES
History of Present Illness  Yasmine Robles is a 49 year old female who presents for pre-operative evaluation for perineal nerve decompression surgery to address foot drop.    She is scheduled for Left common peroneal nerve decompression surgery on July 21st.  By Dr. Cobos request She has no previous issues with anesthesia and has undergone surgeries in the past without complications.    She has no chest pain, palpitations, or shortness of breath. She is not currently on any blood pressure medications, although she has a history of elevated blood pressure that did not require medication.    She reports no cold symptoms, stomach issues, abdominal pain, or urinary frequency. She does not see a cardiologist regularly and has not been required to have an EKG for the upcoming surgery.     Current Medications[1]   Allergies: Allergies[2]   Past Medical History[3]   Past Surgical History[4]   Family History[5]   Social History:   Short Social Hx on File[6]          REVIEW OF SYSTEMS:   GENERAL: feels well otherwise  SKIN: denies any unusual skin lesions  EYES:denies blurred vision or double vision  HEENT: denies nasal congestion, sinus pain or ST  LUNGS: denies shortness of breath with exertion  CARDIOVASCULAR: denies chest pain on exertion  GI: denies abdominal pain,denies heartburn  : no abnormal discharge  MUSCULOSKELETAL: denies back pain  NEURO: denies headaches  PSYCHE: denies depression or anxiety  HEMATOLOGIC: denies hx of anemia  ENDOCRINE: denies thyroid history  ALL/ASTHMA: denies hx of allergy or asthma    EXAM:   /80   Pulse 72   Resp 16   Ht 5' 3\" (1.6 m)   Wt 209 lb (94.8 kg)   LMP 08/26/2024 (Approximate)   SpO2 98%   BMI 37.02 kg/m²   GENERAL: well developed, well nourished,in no apparent distress  SKIN: no rashes,no suspicious lesions  HEENT: atraumatic, normocephalic,ears and throat are clear  EYES:PERRLA, EOMI, normal optic disk,conjunctiva are clear  NECK: supple,no adenopathy,no  bruits  CHEST: no chest tenderness  LUNGS: clear to auscultation  CARDIO: RRR without murmur  GI: good BS's,no masses, HSM or tenderness  MUSCULOSKELETAL: back is not tender,FROM of the back  EXTREMITIES: no cyanosis, clubbing or edema  NEURO: Oriented times three,cranial nerves are intact,motor and sensory are grossly intact    ASSESSMENT AND PLAN:   Yasmine Robles is a 49 year old female who presents for a pre-operative physical exam. Labs stable, patient is cleared for for surgery.   Diagnoses and all orders for this visit:  Assessment & Plan  Foot drop  Scheduled for nerve decompression surgery on July 21, 2025. No previous anesthesia or surgical complications.  - Order basic blood work to assess hemoglobin and bleeding risk.  Will order EKG        Preoperative general physical examination  -     CBC With Differential With Platelet; Future  -     Comp Metabolic Panel (14) [E]; Future  -EKG -future   Left foot drop     Stable   F/u as needed          [1]   Current Outpatient Medications   Medication Sig Dispense Refill    pregabalin 75 MG Oral Cap Take 1 capsule (75 mg total) by mouth 3 (three) times daily. 90 capsule 0    pregabalin 75 MG Oral Cap Take 1 capsule (75 mg total) by mouth 3 (three) times daily. 90 capsule 0    ALPRAZOLAM 0.5 MG Oral Tab TAKE 1 TABLET BY MOUTH EVERY  NIGHT AS NEEDED 30 tablet 0    escitalopram 20 MG Oral Tab Take 1 tablet (20 mg total) by mouth daily. 90 tablet 1    progesterone 200 MG Oral Cap       Thyroid 65 MG Oral Tab Take 1 tablet (65 mg total) by mouth daily.      cholecalciferol (VITAMIN D3) 125 MCG (5000 UT) Oral Cap Take 1 capsule (5,000 Units total) by mouth daily.      Multiple Vitamin (MULTI-VITAMIN) Oral Tab Take 1 tablet by mouth daily.      magnesium 30 MG Oral Tab Take 1 tablet (30 mg total) by mouth 2 (two) times daily. Unsure of dose      Ferrous Sulfate (IRON OR) Take by mouth.      Omega-3 Fatty Acids (FISH OIL OR) Take by mouth.      levothyroxine 75 MCG Oral  Tab Take 1 tablet (75 mcg total) by mouth daily.     [2]   Allergies  Allergen Reactions    Pcn [Penicillins] HIVES    Keflex RASH and ITCHING   [3]   Past Medical History:   ALLERGIC RHINITIS    Anxiety    Celiac disease (HCC)    or IBS    DEPRESSION    Depression    as a teenager/no synptoms now    Disorder of thyroid    DVT (deep venous thrombosis) (HCC)    RLE    Dysmenorrhea    Dyspareunia    HEADACHES    HYPERTHYROIDISM    S/P IODINE    HYPOTHYROIDISM    2ND TO RADIATION    IBS (irritable bowel syndrome)    Pap smear for cervical cancer screening    wnl, neg hpv    Sleep apnea    dx'ed but had no follow up.    Thyroid disease    levoxyl/thyroid    Visual impairment   [4]   Past Surgical History:  Procedure Laterality Date    Hernia surgery      at age 5    Miryam biopsy stereo nodule 1 site left (cpt=19081) Left 10/2021    benign    Tubal ligation Bilateral 04/08/2010    via essure technique. under mac     Umbilical hernia repair  09/25/2023   [5]   Family History  Problem Relation Age of Onset    Hypertension Father     Other (Other) Father     Other (Cancer lungs) Father         lung, at 76    Diabetes Mother     Cancer Maternal Grandmother         lymphoma    Heart Disorder Paternal Grandfather     Heart Disorder Paternal Uncle     Lipids Maternal Aunt     Diabetes Maternal Aunt    [6]   Social History  Socioeconomic History    Marital status:    Tobacco Use    Smoking status: Former     Types: Cigarettes    Smokeless tobacco: Never   Vaping Use    Vaping status: Never Used   Substance and Sexual Activity    Alcohol use: Yes     Alcohol/week: 0.0 standard drinks of alcohol     Comment: social / weekly    Drug use: No    Sexual activity: Yes     Partners: Male     Birth control/protection: Surgical     Comment: ESSURE   Other Topics Concern    Caffeine Concern Yes     Comment: latte 1 daily or a diet coke    Exercise Yes     Comment: weights and running    Seat Belt Yes     Social Drivers of Health      Food Insecurity: No Food Insecurity (2/25/2025)    Received from [facility]    Hunger Vital Sign     Worried About Running Out of Food in the Last Year: Never true     Ran Out of Food in the Last Year: Never true   Transportation Needs: No Transportation Needs (2/25/2025)    Received from [facility]    PRAPARE - Transportation     Lack of Transportation (Medical): No     Lack of Transportation (Non-Medical): No   Housing Stability: Unknown (2/25/2025)    Received from [facility]    Housing Stability Vital Sign     Unable to Pay for Housing in the Last Year: No

## 2025-07-09 NOTE — TELEPHONE ENCOUNTER
Please review. Protocol Failed; No Protocol    No future appointments.    [employer] message sent to patient to schedule an office visit with Provider and/or  Routed to Patient  for assistance with appointment.

## 2025-07-09 NOTE — PROGRESS NOTES
The following individual(s) verbally consented to be recorded using ambient AI listening technology and understand that they can each withdraw their consent to this listening technology at any point by asking the clinician to turn off or pause the recording:    Patient name: Yasmine ROMANO Travis       "CC: Low testosterone    HPI: Darryn Wilson is a 36 y.o. male here for hypogonadism along with other conditions listed in the Visit Diagnosis. Diagnosed ~ 4 years ago after being on pain medication for a back injury. He is using 200 mg q 4 weeks of testosterone cypionate. He is receiving his testosterone from pain management. He has not had an MRI or scrotal u/s. +FHx of DM in his paternal aunt and grandmother. No fhx of thyroid disease. His last injection was July 20th.    New to me and endocrine today.    No hx of brain or testicular injury. No history of body building supplements.    Reports being on clomid over 1 year. His insurance will not pay for clomid anymore.    He has one child and is trying to having a child now. His wife has been checked by her GYN.    Sex drive is low, no morning erections. Normal sexual function and hair growth. His energy is good.     PMHx, PSHx: reviewed in epic.    Social Hx: no ETOH/tobacco use. He did smoke from the age of [age] yo and smoked 1 pack per day.    ROS:   Constitutional: No recent significant weight change  Eyes: No recent visual changes  Cardiovascular: Denies current anginal symptoms  Respiratory: Denies current respiratory difficulty  Gastrointestinal: Denies recent bowel disturbances  GenitoUrinary - No dysuria  Skin: No new skin rash  Neurologic: No focal neurologic complaints  Musc: no muscle aches or joint pain  Remainder ROS negative     /84 (BP Location: Left arm, Patient Position: Sitting, BP Method: Large (Automatic))   Pulse 69   Ht 5' 5" (1.651 m)   Wt 77.8 kg (171 lb 8.3 oz)   BMI 28.54 kg/m²      Labs under media file from April  No results found for: TSH, F3SZIUS, C7NFHHW, THYROIDAB, FREET4       Chemistry    No results found for: NA, K, CL, CO2, BUN, CREATININE, GLU No results found for: CALCIUM, ALKPHOS, AST, ALT, BILITOT, ESTGFRAFRICA, EGFRNONAA      No results found for: HGBA1C     PE:  GENERAL: young male, well developed, well " nourished  NECK: Supple neck, normal thyroid. No bruit. No AN.  LYMPHATIC: No cervical or supraclavicular lymphadenopathy  CARDIOVASCULAR: Normal heart sounds, no pedal edema  RESPIRATORY: Normal effort, CTAB.  ABDOMEN: soft, non-tender, non-distended.  : normal penis. Right testicle is larger than the left. No masses.  FEET: appropriate footwear.   PSYCH: normal mood and affect    Assessment/Plan:   1. Hypogonadism in male  Testosterone Panel    CBC auto differential    Comprehensive metabolic panel    Follicle stimulating hormone    Luteinizing hormone    Prolactin    Testosterone Panel     Hypogonadism-testosterone panel today. Stop testosterone for 2 months. Labs in 2 months before next visit.    F/u 2 mths

## 2025-07-14 ENCOUNTER — TELEPHONE (OUTPATIENT)
Dept: FAMILY MEDICINE CLINIC | Facility: CLINIC | Age: 50
End: 2025-07-14

## 2025-08-13 ENCOUNTER — OFFICE VISIT (OUTPATIENT)
Dept: FAMILY MEDICINE CLINIC | Facility: CLINIC | Age: 50
End: 2025-08-13

## 2025-08-13 VITALS
OXYGEN SATURATION: 98 % | SYSTOLIC BLOOD PRESSURE: 130 MMHG | BODY MASS INDEX: 35.61 KG/M2 | HEIGHT: 63 IN | HEART RATE: 85 BPM | WEIGHT: 201 LBS | DIASTOLIC BLOOD PRESSURE: 72 MMHG

## 2025-08-13 DIAGNOSIS — Z00.00 LABORATORY EXAM ORDERED AS PART OF ROUTINE GENERAL MEDICAL EXAMINATION: ICD-10-CM

## 2025-08-13 DIAGNOSIS — Z12.31 ENCOUNTER FOR SCREENING MAMMOGRAM FOR MALIGNANT NEOPLASM OF BREAST: ICD-10-CM

## 2025-08-13 DIAGNOSIS — Z00.00 ROUTINE GENERAL MEDICAL EXAMINATION AT A HEALTH CARE FACILITY: Primary | ICD-10-CM

## 2025-08-13 DIAGNOSIS — G47.00 INSOMNIA, UNSPECIFIED TYPE: ICD-10-CM

## 2025-08-13 DIAGNOSIS — M21.372 LEFT FOOT DROP: ICD-10-CM

## 2025-08-13 DIAGNOSIS — F41.9 ANXIETY: ICD-10-CM

## 2025-08-13 RX ORDER — ASPIRIN 81 MG/1
81 TABLET, CHEWABLE ORAL 2 TIMES DAILY
COMMUNITY

## 2025-08-14 ENCOUNTER — LAB ENCOUNTER (OUTPATIENT)
Dept: LAB | Age: 50
End: 2025-08-14
Attending: FAMILY MEDICINE

## 2025-08-14 DIAGNOSIS — Z00.00 LABORATORY EXAM ORDERED AS PART OF ROUTINE GENERAL MEDICAL EXAMINATION: ICD-10-CM

## 2025-08-14 LAB
ALBUMIN SERPL-MCNC: 4.7 G/DL (ref 3.2–4.8)
ALBUMIN/GLOB SERPL: 1.5 (ref 1–2)
ALP LIVER SERPL-CCNC: 82 U/L (ref 39–100)
ALT SERPL-CCNC: 17 U/L (ref 10–49)
ANION GAP SERPL CALC-SCNC: 14 MMOL/L (ref 0–18)
AST SERPL-CCNC: 21 U/L (ref ?–34)
BASOPHILS # BLD AUTO: 0.06 X10(3) UL (ref 0–0.2)
BASOPHILS NFR BLD AUTO: 1.1 %
BILIRUB SERPL-MCNC: 0.3 MG/DL (ref 0.3–1.2)
BUN BLD-MCNC: 16 MG/DL (ref 9–23)
CALCIUM BLD-MCNC: 9.9 MG/DL (ref 8.7–10.6)
CHLORIDE SERPL-SCNC: 102 MMOL/L (ref 98–112)
CHOLEST SERPL-MCNC: 230 MG/DL (ref ?–200)
CO2 SERPL-SCNC: 24 MMOL/L (ref 21–32)
CREAT BLD-MCNC: 1.04 MG/DL (ref 0.55–1.02)
EGFRCR SERPLBLD CKD-EPI 2021: 66 ML/MIN/1.73M2 (ref 60–?)
EOSINOPHIL # BLD AUTO: 0.18 X10(3) UL (ref 0–0.7)
EOSINOPHIL NFR BLD AUTO: 3.4 %
ERYTHROCYTE [DISTWIDTH] IN BLOOD BY AUTOMATED COUNT: 12.6 %
FASTING PATIENT LIPID ANSWER: YES
FASTING STATUS PATIENT QL REPORTED: YES
GLOBULIN PLAS-MCNC: 3.1 G/DL (ref 2–3.5)
GLUCOSE BLD-MCNC: 98 MG/DL (ref 70–99)
HCT VFR BLD AUTO: 43.3 % (ref 35–48)
HDLC SERPL-MCNC: 54 MG/DL (ref 40–59)
HGB BLD-MCNC: 14.9 G/DL (ref 12–16)
IMM GRANULOCYTES # BLD AUTO: 0.02 X10(3) UL (ref 0–1)
IMM GRANULOCYTES NFR BLD: 0.4 %
LDLC SERPL CALC-MCNC: 154 MG/DL (ref ?–100)
LYMPHOCYTES # BLD AUTO: 1.83 X10(3) UL (ref 1–4)
LYMPHOCYTES NFR BLD AUTO: 34.7 %
MCH RBC QN AUTO: 30.3 PG (ref 26–34)
MCHC RBC AUTO-ENTMCNC: 34.4 G/DL (ref 31–37)
MCV RBC AUTO: 88.2 FL (ref 80–100)
MONOCYTES # BLD AUTO: 0.47 X10(3) UL (ref 0.1–1)
MONOCYTES NFR BLD AUTO: 8.9 %
NEUTROPHILS # BLD AUTO: 2.71 X10 (3) UL (ref 1.5–7.7)
NEUTROPHILS # BLD AUTO: 2.71 X10(3) UL (ref 1.5–7.7)
NEUTROPHILS NFR BLD AUTO: 51.5 %
NONHDLC SERPL-MCNC: 176 MG/DL (ref ?–130)
OSMOLALITY SERPL CALC.SUM OF ELEC: 291 MOSM/KG (ref 275–295)
PLATELET # BLD AUTO: 349 10(3)UL (ref 150–450)
POTASSIUM SERPL-SCNC: 4.3 MMOL/L (ref 3.5–5.1)
PROT SERPL-MCNC: 7.8 G/DL (ref 5.7–8.2)
RBC # BLD AUTO: 4.91 X10(6)UL (ref 3.8–5.3)
SODIUM SERPL-SCNC: 140 MMOL/L (ref 136–145)
TRIGL SERPL-MCNC: 123 MG/DL (ref 30–149)
TSI SER-ACNC: 0.7 UIU/ML (ref 0.55–4.78)
VIT D+METAB SERPL-MCNC: 78.8 NG/ML (ref 30–100)
VLDLC SERPL CALC-MCNC: 24 MG/DL (ref 0–30)
WBC # BLD AUTO: 5.3 X10(3) UL (ref 4–11)

## 2025-08-14 PROCEDURE — 80061 LIPID PANEL: CPT | Performed by: FAMILY MEDICINE

## 2025-08-14 PROCEDURE — 80050 GENERAL HEALTH PANEL: CPT | Performed by: FAMILY MEDICINE

## 2025-08-14 PROCEDURE — 82306 VITAMIN D 25 HYDROXY: CPT | Performed by: FAMILY MEDICINE

## 2025-08-15 RX ORDER — ESCITALOPRAM OXALATE 20 MG/1
20 TABLET ORAL DAILY
Qty: 90 TABLET | Refills: 3 | Status: SHIPPED | OUTPATIENT
Start: 2025-08-15

## 2025-08-18 RX ORDER — ALPRAZOLAM 0.5 MG
0.5 TABLET ORAL NIGHTLY PRN
Qty: 30 TABLET | Refills: 0 | Status: SHIPPED | OUTPATIENT
Start: 2025-08-18

## 2025-08-27 ENCOUNTER — TELEPHONE (OUTPATIENT)
Dept: FAMILY MEDICINE CLINIC | Facility: CLINIC | Age: 50
End: 2025-08-27

## (undated) DIAGNOSIS — G47.00 INSOMNIA, UNSPECIFIED TYPE: ICD-10-CM

## (undated) DEVICE — SLEEVE COMPR M KNEE LEN SGL USE KENDALL SCD

## (undated) DEVICE — SOLUTION IRRIG 1000ML 0.9% NACL USP BTL

## (undated) DEVICE — UNDYED BRAIDED (POLYGLACTIN 910), SYNTHETIC ABSORBABLE SUTURE: Brand: COATED VICRYL

## (undated) DEVICE — SUTURE VCRL SZ 3-0 L27IN ABSRB UD L26MM CT-2

## (undated) DEVICE — SUTURE ETHBND EXCEL SZ 0 L18IN NONABSORB

## (undated) DEVICE — LIGHT HANDLE

## (undated) DEVICE — BREAST-HERNIA-PORT CDS-LF: Brand: MEDLINE INDUSTRIES, INC.

## (undated) DEVICE — STERILE POLYISOPRENE POWDER-FREE SURGICAL GLOVES: Brand: PROTEXIS

## (undated) NOTE — Clinical Note
I had the pleasure of seeing Kelsy Katz on 4/13/2022. Please see my attached note.     Velvet Easton MD FACS  [facility]

## (undated) NOTE — MR AVS SNAPSHOT
[facility] [address], [facility] [[email or web address]] [id number] Replaced by [facility] [address]  [phone]               Thank you for choosing us for your health care visit with aPmela Mon MD.  We are glad to serve you and happy to provide you with t ALPRAZolam 0.25 MG Tabs   Take 1 tablet (0.25 mg total) by mouth 3 (three) times daily as needed.    Commonly known as:  XANAX           ARMOUR THYROID 90 MG Tabs   Generic drug:  thyroid           clotrimazole-betamethasone 1-0.05 % Crea   Use as di Eat plenty of protein, keep the fat content low Sugars:  sodas and sports drinks, candies and desserts   Eat plenty of low-fat dairy products High fat meats and dairy   Choose whole grain products Foods high in sodium   Water is best for hydration Fast juan manuel
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
## (undated) NOTE — LETTER
3/10/2022  Leighton Varma  [address]    We take each of our patient's health very seriously and the key to maintaining your health is an annual wellness physical.  Review of your medical records shows that it is time for your annual wellness exam.  Please contact my office at your earliest convenience to schedule this appointment at [phone] for medication refills.   Thank Amanda Schilling MD

## (undated) NOTE — LETTER
04/24/19        [place] [id number]      Dear Ty Minor,    [id number] records indicate that you have outstanding lab work and or testing that was ordered for you and has not yet been completed:        D-Dimer      CMP      CB

## (undated) NOTE — Clinical Note
I had the pleasure of seeing Dona Cortez on 5/22/2023. Please see my attached note.   Lucia Quintero MD [facility]

## (undated) NOTE — MR AVS SNAPSHOT
After Visit Summary   4/19/2017    Anita Maldonado    MRN: [id number]           Visit Information        Provider Department Dept Phone    4/19/2017  5:00 PM Mely Tierney MD [facility] [phone]      Your Vitals Were     BP Pulse Ht Wt Instructions: To schedule an appointment for your radiology test please call [facility] Scheduling at [phone]. [facility] now offers Video Visits through [address] for adult and pediatric patients.   Video Visits are available Mo